# Patient Record
Sex: FEMALE | Race: WHITE | Employment: FULL TIME | URBAN - METROPOLITAN AREA
[De-identification: names, ages, dates, MRNs, and addresses within clinical notes are randomized per-mention and may not be internally consistent; named-entity substitution may affect disease eponyms.]

---

## 2017-04-14 ENCOUNTER — HOSPITAL ENCOUNTER (OUTPATIENT)
Dept: MAMMOGRAPHY | Age: 52
Discharge: HOME | End: 2017-04-14
Attending: OBSTETRICS & GYNECOLOGY
Payer: COMMERCIAL

## 2017-04-14 DIAGNOSIS — Z12.39 SCREENING FOR BREAST CANCER: ICD-10-CM

## 2017-04-14 PROCEDURE — 77067 SCR MAMMO BI INCL CAD: CPT

## 2017-04-19 NOTE — PROGRESS NOTES
Call [facility] and let her know her mammogram is normal.  She does have dense breasts though. I would recommend that she have a 3D screening ultrasound next year to get a better view of the dense tissue.

## 2018-04-16 ENCOUNTER — HOSPITAL ENCOUNTER (OUTPATIENT)
Dept: MAMMOGRAPHY | Age: 53
Discharge: HOME | End: 2018-04-16
Attending: OBSTETRICS & GYNECOLOGY
Payer: COMMERCIAL

## 2018-04-16 DIAGNOSIS — Z12.31 VISIT FOR SCREENING MAMMOGRAM: ICD-10-CM

## 2018-04-16 PROCEDURE — 77063 BREAST TOMOSYNTHESIS BI: CPT

## 2018-11-15 ENCOUNTER — APPOINTMENT (OUTPATIENT)
Dept: ULTRASOUND IMAGING | Age: 53
End: 2018-11-15
Attending: EMERGENCY MEDICINE
Payer: COMMERCIAL

## 2018-11-15 ENCOUNTER — HOSPITAL ENCOUNTER (EMERGENCY)
Age: 53
Discharge: HOME | End: 2018-11-15
Attending: EMERGENCY MEDICINE
Payer: COMMERCIAL

## 2018-11-15 ENCOUNTER — APPOINTMENT (OUTPATIENT)
Dept: CT IMAGING | Age: 53
End: 2018-11-15
Attending: EMERGENCY MEDICINE
Payer: COMMERCIAL

## 2018-11-15 VITALS
OXYGEN SATURATION: 98 % | BODY MASS INDEX: 36.65 KG/M2 | TEMPERATURE: 98 F | SYSTOLIC BLOOD PRESSURE: 151 MMHG | HEIGHT: 65 IN | WEIGHT: 220 LBS | DIASTOLIC BLOOD PRESSURE: 71 MMHG | HEART RATE: 88 BPM | RESPIRATION RATE: 16 BRPM

## 2018-11-15 DIAGNOSIS — K80.20 CALCULUS OF GALLBLADDER WITHOUT CHOLECYSTITIS WITHOUT OBSTRUCTION: ICD-10-CM

## 2018-11-15 DIAGNOSIS — N30.01 ACUTE CYSTITIS WITH HEMATURIA: Primary | ICD-10-CM

## 2018-11-15 LAB
ALBUMIN SERPL-MCNC: 3.6 G/DL (ref 3.5–5)
ALBUMIN/GLOB SERPL: 1.1 {RATIO}
ALP SERPL-CCNC: 134 U/L (ref 50–130)
ALT SERPL-CCNC: 64 U/L (ref 12–65)
ANION GAP SERPL CALC-SCNC: 9 MMOL/L
AST SERPL-CCNC: 33 U/L (ref 15–37)
BACTERIA URNS QL MICRO: 0 /HPF
BASOPHILS # BLD: 0 K/UL (ref 0–0.2)
BASOPHILS NFR BLD: 0 % (ref 0–2)
BILIRUB SERPL-MCNC: 0.5 MG/DL (ref 0.2–1.1)
BUN SERPL-MCNC: 12 MG/DL (ref 6–23)
CALCIUM SERPL-MCNC: 8.8 MG/DL (ref 8.3–10.4)
CASTS URNS QL MICRO: ABNORMAL /LPF
CHLORIDE SERPL-SCNC: 104 MMOL/L (ref 98–107)
CO2 SERPL-SCNC: 24 MMOL/L (ref 21–32)
CREAT SERPL-MCNC: 0.87 MG/DL (ref 0.6–1)
DIFFERENTIAL METHOD BLD: ABNORMAL
EOSINOPHIL # BLD: 0 K/UL (ref 0–0.8)
EOSINOPHIL NFR BLD: 0 % (ref 0.5–7.8)
EPI CELLS #/AREA URNS HPF: ABNORMAL /HPF
ERYTHROCYTE [DISTWIDTH] IN BLOOD BY AUTOMATED COUNT: 12.6 %
GLOBULIN SER CALC-MCNC: 3.4 G/DL (ref 2.3–3.5)
GLUCOSE SERPL-MCNC: 102 MG/DL (ref 65–100)
HCT VFR BLD AUTO: 41.4 % (ref 35.8–46.3)
HGB BLD-MCNC: 13.7 G/DL (ref 11.7–15.4)
IMM GRANULOCYTES # BLD: 0.1 K/UL (ref 0–0.5)
IMM GRANULOCYTES NFR BLD AUTO: 1 % (ref 0–5)
LYMPHOCYTES # BLD: 1.1 K/UL (ref 0.5–4.6)
LYMPHOCYTES NFR BLD: 9 % (ref 13–44)
MCH RBC QN AUTO: 28.8 PG (ref 26.1–32.9)
MCHC RBC AUTO-ENTMCNC: 33.1 G/DL (ref 31.4–35)
MCV RBC AUTO: 87.2 FL (ref 79.6–97.8)
MONOCYTES # BLD: 0.8 K/UL (ref 0.1–1.3)
MONOCYTES NFR BLD: 6 % (ref 4–12)
NEUTS SEG # BLD: 10.9 K/UL (ref 1.7–8.2)
NEUTS SEG NFR BLD: 84 % (ref 43–78)
NRBC # BLD: 0 K/UL (ref 0–0.2)
PLATELET # BLD AUTO: 231 K/UL (ref 150–450)
PMV BLD AUTO: 9.8 FL (ref 9.4–12.3)
POTASSIUM SERPL-SCNC: 4.2 MMOL/L (ref 3.5–5.1)
PROT SERPL-MCNC: 7 G/DL
RBC # BLD AUTO: 4.75 M/UL (ref 4.05–5.2)
RBC #/AREA URNS HPF: >100 /HPF
SODIUM SERPL-SCNC: 137 MMOL/L (ref 136–145)
WBC # BLD AUTO: 13 K/UL (ref 4.3–11.1)
WBC URNS QL MICRO: >100 /HPF

## 2018-11-15 PROCEDURE — 87186 SC STD MICRODIL/AGAR DIL: CPT

## 2018-11-15 PROCEDURE — 80053 COMPREHEN METABOLIC PANEL: CPT

## 2018-11-15 PROCEDURE — 96375 TX/PRO/DX INJ NEW DRUG ADDON: CPT | Performed by: EMERGENCY MEDICINE

## 2018-11-15 PROCEDURE — 85025 COMPLETE CBC W/AUTO DIFF WBC: CPT

## 2018-11-15 PROCEDURE — 99284 EMERGENCY DEPT VISIT MOD MDM: CPT | Performed by: EMERGENCY MEDICINE

## 2018-11-15 PROCEDURE — 96376 TX/PRO/DX INJ SAME DRUG ADON: CPT | Performed by: EMERGENCY MEDICINE

## 2018-11-15 PROCEDURE — 74011000258 HC RX REV CODE- 258: Performed by: EMERGENCY MEDICINE

## 2018-11-15 PROCEDURE — 96365 THER/PROPH/DIAG IV INF INIT: CPT | Performed by: EMERGENCY MEDICINE

## 2018-11-15 PROCEDURE — 81015 MICROSCOPIC EXAM OF URINE: CPT

## 2018-11-15 PROCEDURE — 87088 URINE BACTERIA CULTURE: CPT

## 2018-11-15 PROCEDURE — 74011250636 HC RX REV CODE- 250/636: Performed by: EMERGENCY MEDICINE

## 2018-11-15 PROCEDURE — 87086 URINE CULTURE/COLONY COUNT: CPT

## 2018-11-15 PROCEDURE — 76705 ECHO EXAM OF ABDOMEN: CPT

## 2018-11-15 PROCEDURE — 74176 CT ABD & PELVIS W/O CONTRAST: CPT

## 2018-11-15 RX ORDER — ONDANSETRON 2 MG/ML
4 INJECTION INTRAMUSCULAR; INTRAVENOUS
Status: COMPLETED | OUTPATIENT
Start: 2018-11-15 | End: 2018-11-15

## 2018-11-15 RX ORDER — MORPHINE SULFATE 2 MG/ML
4 INJECTION, SOLUTION INTRAMUSCULAR; INTRAVENOUS ONCE
Status: DISCONTINUED | OUTPATIENT
Start: 2018-11-15 | End: 2018-11-15 | Stop reason: SDUPTHER

## 2018-11-15 RX ORDER — ONDANSETRON 4 MG/1
4 TABLET, ORALLY DISINTEGRATING ORAL
Qty: 6 TAB | Refills: 0 | Status: SHIPPED | OUTPATIENT
Start: 2018-11-15 | End: 2018-11-19

## 2018-11-15 RX ORDER — CEPHALEXIN 500 MG/1
500 CAPSULE ORAL 4 TIMES DAILY
Qty: 28 CAP | Refills: 0 | Status: SHIPPED | OUTPATIENT
Start: 2018-11-15 | End: 2018-11-22

## 2018-11-15 RX ORDER — KETOROLAC TROMETHAMINE 30 MG/ML
30 INJECTION, SOLUTION INTRAMUSCULAR; INTRAVENOUS
Status: COMPLETED | OUTPATIENT
Start: 2018-11-15 | End: 2018-11-15

## 2018-11-15 RX ORDER — MORPHINE SULFATE 4 MG/ML
4 INJECTION INTRAVENOUS ONCE
Status: COMPLETED | OUTPATIENT
Start: 2018-11-15 | End: 2018-11-15

## 2018-11-15 RX ADMIN — SODIUM CHLORIDE 1000 ML: 900 INJECTION, SOLUTION INTRAVENOUS at 09:10

## 2018-11-15 RX ADMIN — KETOROLAC TROMETHAMINE 30 MG: 30 INJECTION, SOLUTION INTRAMUSCULAR at 09:10

## 2018-11-15 RX ADMIN — CEFTRIAXONE 1 G: 1 INJECTION, POWDER, FOR SOLUTION INTRAMUSCULAR; INTRAVENOUS at 10:47

## 2018-11-15 RX ADMIN — ONDANSETRON 4 MG: 2 INJECTION INTRAMUSCULAR; INTRAVENOUS at 10:44

## 2018-11-15 RX ADMIN — ONDANSETRON 4 MG: 2 INJECTION INTRAMUSCULAR; INTRAVENOUS at 09:09

## 2018-11-15 RX ADMIN — MORPHINE SULFATE 4 MG: 4 INJECTION INTRAVENOUS at 10:45

## 2018-11-15 NOTE — DISCHARGE INSTRUCTIONS
Cholecystectomy: Before Your Surgery  What is cholecystectomy? Cholecystectomy (pj-shk-ckc-MICHELLE-tuh-dhiraj) is a type of surgery. It removes a diseased gallbladder. This surgery is usually done as a laparoscopic surgery. The doctor puts a lighted tube and other surgical tools through small cuts (incisions) in your belly. The tube is called a scope. It lets your doctor see your organs so he or she can do the surgery. The incisions leave scars that fade with time. Most people go home the same day. You probably will feel better each day. Most people have only a small amount of pain after 1 week. If you have a desk job, you can probably go back to work in 1 to 2 weeks. If you lift heavy objects or have a very active job, it may take up to 4 weeks. In some cases, open surgery is the best choice. Your doctor may choose open surgery in advance. Or he or she may choose it in the middle of laparoscopic surgery. In open surgery, the doctor makes a larger incision in your upper belly. If you have open surgery, you will probably stay in the hospital for 2 to 4 days. And it may take 4 to 6 weeks to get back to your normal routine. Follow-up care is a key part of your treatment and safety. Be sure to make and go to all appointments, and call your doctor if you are having problems. It's also a good idea to know your test results and keep a list of the medicines you take. What happens before surgery?   Surgery can be stressful. This information will help you understand what you can expect. And it will help you safely prepare for surgery.   Preparing for surgery    · Understand exactly what surgery is planned, along with the risks, benefits, and other options. · Tell your doctors ALL the medicines, vitamins, supplements, and herbal remedies you take.  Some of these can increase the risk of bleeding or interact with anesthesia.     · If you take blood thinners, such as warfarin (Coumadin), clopidogrel (Plavix), or aspirin, be sure to talk to your doctor. He or she will tell you if you should stop taking these medicines before your surgery. Make sure that you understand exactly what your doctor wants you to do.     · Your doctor will tell you which medicines to take or stop before your surgery. You may need to stop taking certain medicines a week or more before surgery. So talk to your doctor as soon as you can.     · If you have an advance directive, let your doctor know. It may include a living will and a durable power of  for health care. Bring a copy to the hospital. If you don't have one, you may want to prepare one. It lets your doctor and loved ones know your health care wishes. Doctors advise that everyone prepare these papers before any type of surgery or procedure.     · You may need to empty your colon with an enema or laxative. Your doctor will tell you how to do this. What happens on the day of surgery? · Follow the instructions exactly about when to stop eating and drinking. If you don't, your surgery may be canceled. If your doctor told you to take your medicines on the day of surgery, take them with only a sip of water.     · Take a bath or shower before you come in for your surgery. Do not apply lotions, perfumes, deodorants, or nail polish.     · Do not shave the surgical site yourself.     · Take off all jewelry and piercings. And take out contact lenses, if you wear them.    At the hospital or surgery center   · Bring a picture ID.     · The area for surgery is often marked to make sure there are no errors.     · You will be kept comfortable and safe by your anesthesia provider. You will be asleep during the surgery.     · The surgery usually takes 1 to 2 hours. Going home   · Be sure you have someone to drive you home. Anesthesia and pain medicine make it unsafe for you to drive.     · You will be given more specific instructions about recovering from your surgery.  They will cover things like diet, wound care, follow-up care, driving, and getting back to your normal routine. When should you call your doctor? · You have questions or concerns.     · You don't understand how to prepare for your surgery.     · You become ill before the surgery (such as fever, flu, or a cold).     · You need to reschedule or have changed your mind about having the surgery. Where can you learn more? Go to http://mani-maria dolores.info/. Enter R342 in the search box to learn more about \"Cholecystectomy: Before Your Surgery. \"  Current as of: March 28, 2018  Content Version: 11.8  © 6651-2981 [employer] Care instructions adapted under license by [employer] (which disclaims liability or warranty for this information). If you have questions about a medical condition or this instruction, always ask your healthcare professional. [address] any warranty or liability for your use of this information. Low-Fat Diet for Gallbladder Disease: Care Instructions  Your Care Instructions    When you eat, the gallbladder releases bile, which helps you digest the fat in food. If you have an inflamed gallbladder, this may cause pain. A low-fat diet may give your gallbladder a rest so you can start to heal. Your doctor and dietitian can help you make an eating plan that does not irritate your digestive system. Always talk with your doctor or dietitian before you make changes in your diet. Follow-up care is a key part of your treatment and safety. Be sure to make and go to all appointments, and call your doctor if you are having problems. It's also a good idea to know your test results and keep a list of the medicines you take. How can you care for yourself at home? · Eat many small meals and snacks each day instead of three large meals. · Choose lean meats. ? Eat no more than 5 to 6½ ounces of meat a day. ? Cut off all fat you can see. ?  Eat chicken and turkey without the skin. ? Many types of fish, such as salmon, lake trout, tuna, and herring, provide healthy omega-3 fat. But, avoid fish canned in oil, such as sardines in olive oil. ? Bake, broil, or grill meats, poultry, or fish instead of frying them in butter or fat. · Drink or eat nonfat or low-fat milk, yogurt, cheese, or other milk products each day. ? Read the labels on cheeses, and choose those with less than 5 grams of fat an ounce. ? Try fat-free sour cream, cream cheese, or yogurt. ? Avoid cream soups and cream sauces on pasta. ? Eat low-fat ice cream, frozen yogurt, or sorbet. Avoid regular ice cream.  · Eat whole-grain cereals, breads, crackers, rice, or pasta. Avoid high-fat foods such as croissants, scones, biscuits, waffles, doughnuts, muffins, granola, and high-fat breads. · Flavor your foods with herbs and spices (such as basil, tarragon, or mint), fat-free sauces, or lemon juice instead of butter. You can also use butter substitutes, fat-free mayonnaise, or fat-free dressing. · Try applesauce, prune puree, or mashed bananas to replace some or all of the fat when you bake. · Limit fats and oils, such as butter, margarine, mayonnaise, and salad dressing, to no more than 1 tablespoon a meal.  · Avoid high-fat foods, such as:  ? Chocolate, whole milk, ice cream, and processed cheese. ? Fried or buttered foods. ? Sausage, salami, and galan. ? Cinnamon rolls, cakes, pies, cookies, and other pastries. ? Prepared snack foods, such as potato chips, nut and granola bars, and mixed nuts. ? Coconut and avocado. · Learn how to read food labels for serving sizes and ingredients. Fast-food and convenience-food meals often have lots of fat. Where can you learn more? Go to http://mani-maria dolores.info/. Enter L936 in the search box to learn more about \"Low-Fat Diet for Gallbladder Disease: Care Instructions. \"  Current as of: March 29, 2018  Content Version: 11.8  © 1691-1673 [employer]. Care instructions adapted under license by [employer] (which disclaims liability or warranty for this information). If you have questions about a medical condition or this instruction, always ask your healthcare professional. Jhonny Ellis any warranty or liability for your use of this information. Learning About Gallstones  What are gallstones? Gallstones are stones that form in the gallbladder. The gallbladder is a small sac located just under the liver. It stores bile released by the liver. Bile helps you digest fats. Gallstones can be smaller than a grain of sand or as large as a golf ball. Gallstones form when cholesterol and other things found in bile make stones. They can also form if the gallbladder doesn't empty as it should. Gallstones can also form in the common bile duct or cystic duct. These tubes carry bile from the gallbladder and the liver to the small intestine. What happens when you have gallstones? Gallstones can cause many different problems, such as:  · A blockage in the common bile duct. · Inflammation or infection of the gallbladder (acute cholecystitis). This can happen when a gallstone blocks the cystic duct. · Inflammation or infection of the common bile duct (cholangitis). This can happen when gallstones get stuck in the common bile duct. In rare cases, this can damage the liver or spread infection. · Pancreatitis, an inflammation of the pancreas. What are the symptoms? · Most people who have gallstones don't have symptoms. · Symptoms can include:  ? Mild pain in the pit of your stomach or in the upper right part of your belly. It may spread to your right upper back or shoulder blade area. ? Severe pain that may come and go or be steady. It may get worse when you eat. ? Fever and chills, if a gallstone is blocking a bile duct and causing an infection.   ? Yellowing of your skin and the whites of your eyes.  How can you prevent gallstones? There is no sure way to prevent gallstones. But you can reduce your risk of forming gallstones that can cause symptoms. · Stay at a healthy weight. If you need to lose weight, do so slowly and sensibly. · Eat regular, balanced meals. · Be active, and exercise regularly. How are gallstones treated? · If you don't have symptoms, you probably don't need treatment. · For mild symptoms, your doctor may have you take pain medicine and wait to see if the pain goes away. · For severe pain or infection, or if you have more than one gallstone attack, your doctor may suggest surgery to have your gallbladder removed. The body works fine without a gallbladder. Follow-up care is a key part of your treatment and safety. Be sure to make and go to all appointments, and call your doctor if you are having problems. It's also a good idea to know your test results and keep a list of the medicines you take. Where can you learn more? Go to http://mani-maria dolores.info/. Enter  in the search box to learn more about \"Learning About Gallstones. \"  Current as of: March 28, 2018  Content Version: 11.8  © 3611-1967 [employer]. Care instructions adapted under license by [employer] (which disclaims liability or warranty for this information). If you have questions about a medical condition or this instruction, always ask your healthcare professional. [place] [address] any warranty or liability for your use of this information. Urinary Tract Infection in Women: Care Instructions  Your Care Instructions    A urinary tract infection, or UTI, is a general term for an infection anywhere between the kidneys and the urethra (where urine comes out). Most UTIs are bladder infections. They often cause pain or burning when you urinate. UTIs are caused by bacteria and can be cured with antibiotics.  Be sure to complete your treatment so that the infection goes away. Follow-up care is a key part of your treatment and safety. Be sure to make and go to all appointments, and call your doctor if you are having problems. It's also a good idea to know your test results and keep a list of the medicines you take. How can you care for yourself at home? · Take your antibiotics as directed. Do not stop taking them just because you feel better. You need to take the full course of antibiotics. · Drink extra water and other fluids for the next day or two. This may help wash out the bacteria that are causing the infection. (If you have kidney, heart, or liver disease and have to limit fluids, talk with your doctor before you increase your fluid intake.)  · Avoid drinks that are carbonated or have caffeine. They can irritate the bladder. · Urinate often. Try to empty your bladder each time. · To relieve pain, take a hot bath or lay a heating pad set on low over your lower belly or genital area. Never go to sleep with a heating pad in place. To prevent UTIs  · Drink plenty of water each day. This helps you urinate often, which clears bacteria from your system. (If you have kidney, heart, or liver disease and have to limit fluids, talk with your doctor before you increase your fluid intake.)  · Urinate when you need to. · Urinate right after you have sex. · Change sanitary pads often. · Avoid douches, bubble baths, feminine hygiene sprays, and other feminine hygiene products that have deodorants. · After going to the bathroom, wipe from front to back. When should you call for help? Call your doctor now or seek immediate medical care if:    · Symptoms such as fever, chills, nausea, or vomiting get worse or appear for the first time.     · You have new pain in your back just below your rib cage.  This is called flank pain.     · There is new blood or pus in your urine.     · You have any problems with your antibiotic medicine.    Watch closely for changes in your health, and be sure to contact your doctor if:    · You are not getting better after taking an antibiotic for 2 days.     · Your symptoms go away but then come back. Where can you learn more? Go to http://mani-maria dolores.info/. Enter Q289 in the search box to learn more about \"Urinary Tract Infection in Women: Care Instructions. \"  Current as of: March 21, 2018  Content Version: 11.8  © 8416-8514 [employer]. Care instructions adapted under license by [employer] (which disclaims liability or warranty for this information). If you have questions about a medical condition or this instruction, always ask your healthcare professional. [place] [address] any warranty or liability for your use of this information.

## 2018-11-15 NOTE — ED NOTES
I have reviewed discharge instructions with the patient. The patient verbalized understanding. Patient left ED via Discharge Method: ambulatory to Home with (family). Opportunity for questions and clarification provided. Patient given 2 scripts. To continue your aftercare when you leave the hospital, you may receive an automated call from our care team to check in on how you are doing. This is a free service and part of our promise to provide the best care and service to meet your aftercare needs.  If you have questions, or wish to unsubscribe from this service please call [phone]. Thank you for Choosing our [facility] Emergency Department.

## 2018-11-15 NOTE — ED TRIAGE NOTES
Patient complaining of seeing small amount of blood in urine last night however during night started with increased cramping and states \"pure blood\" with urine now. Patient advises that she has a \"filling sensation\" right after urination.

## 2018-11-18 LAB
BACTERIA SPEC CULT: ABNORMAL
BACTERIA SPEC CULT: ABNORMAL
SERVICE CMNT-IMP: ABNORMAL

## 2018-11-21 PROBLEM — E66.01 SEVERE OBESITY (HCC): Status: ACTIVE | Noted: 2018-11-21

## 2018-11-21 NOTE — H&P (VIEW-ONLY)
Chris Thomason DO 
[address], [address] [place] Phone [phone]   Fax [phone] Date of visit: 2018 Primary/Requesting provider: Nemesio London MD 
 
Chief Complaint Patient presents with  New Patient  
  gallstones Name: Sadia Ceja MRN: [id number] : 1965 Age: 48 y.o. Sex: female PCP: Nemesio London MD  
 
 
CC:   
Chief Complaint Patient presents with  New Patient  
  gallstones HPI: 
 
 Sadia Ceja is a 48 y.o. female who presents for evaluation of cholelithiasis. This is a healthy 80-year-old female complaining of right upper quadrant abdominal pain and nausea for up to 1 year. She states that she began having the right upper quadrant abdominal pain rated 5 out of 10 approximately a year ago. She reports associated nausea without vomiting. She denies any fevers. She states that approximately 3 months ago the pain became more consistent and more frequent still rated 5 out of 10. She presented to the emergency department where a CT of the abdomen pelvis revealed a gallstone this was followed up with an ultrasound which revealed cholelithiasis without gallbladder wall thickening or ductal dilatation. Her liver function tests have been normal to date. She states that nothing seems to make the pain better. She cannot identify any aggravating factors such as certain foods or activities. She is here today to discuss gallbladder disorders and possible surgery. Madison Oh PMH: 
 
Past Medical History:  
Diagnosis Date  Fibromyalgia   
 on med for control  Fibromyalgia  GERD (gastroesophageal reflux disease) diet controlled  History of IBS  Hypertension   
 controlled with med  Liver disease   
 elevated LFTs  Renal stone  Severe obesity ([address] 2018 PSH: 
 
Past Surgical History:  
Procedure Laterality Date  HX COLONOSCOPY  02/2018  
 and egd  HX [de-identified]  10yrs old  
 plastic surgery on lip  HX HYSTERECTOMY  HX ROTATOR CUFF REPAIR Right 2018  HX TOTAL LAPAROSCOPIC HYSTERECTOMY W/ BS&O  12/10/14 Eric Mina MEDS: 
 
Current Outpatient Medications Medication Sig  cephALEXin (KEFLEX) 500 mg capsule Take 1 Cap by mouth four (4) times daily for 7 days.  pantoprazole (PROTONIX) 20 mg tablet Take 20 mg by mouth daily.  pramipexole (MIRAPEX) 0.5 mg tablet Take 0.5 mg by mouth three (3) times daily.  atenolol (TENORMIN) 50 mg tablet Take  by mouth daily. Take / use AM day of surgery  per anesthesia protocols.  sertraline (ZOLOFT) 50 mg tablet Take  by mouth daily. Take / use AM day of surgery  per anesthesia protocols. Indications: fibromyalgia  tramadol (ULTRAM) 50 mg tablet Take 50 mg by mouth every six (6) hours as needed. Take / use AM day of surgery if needed per anesthesia protocols. No current facility-administered medications for this visit. ALLERGIES:   
 
Allergies Allergen Reactions  Chlorhexidine Rash  Codeine Other (comments) \"flushed\" \"heart palpitations\" SH: 
 
Social History Tobacco Use  Smoking status: Never Smoker  Smokeless tobacco: Never Used Substance Use Topics  Alcohol use: No  
 Drug use: No  
 
 
FH: 
 
Family History Problem Relation Age of Onset  Cancer Father   
     lung  Hypertension Brother  Emphysema Mother  Cancer Mother  Cancer Sister   
     brain tumor  Cancer Sister   
     bladder cancer x 2  
 Stroke Sister  Heart Disease Brother  Cancer Brother   
     bladder cancer  Heart Attack Brother  Thyroid Disease Brother Review of Systems Constitutional: Negative. HENT: Negative. Eyes: Positive for blurred vision. Respiratory: Negative. Cardiovascular: Negative. Gastrointestinal: Positive for abdominal pain, diarrhea, heartburn and nausea. Genitourinary:  
     Being treated for uti. Musculoskeletal: Positive for myalgias. Skin: Negative. Neurological: Negative. Endo/Heme/Allergies: Bruises/bleeds easily. Psychiatric/Behavioral: Negative. Physical Exam:  
 
Visit Vitals /90 Pulse 60 Wt 224 lb (101.6 kg) LMP 11/02/2014 BMI 37.28 kg/m² Physical Exam  
Constitutional: She is oriented to person, place, and time and well-developed, well-nourished, and in no distress. HENT:  
Head: Normocephalic and atraumatic. Mouth/Throat: Oropharynx is clear and moist.  
Eyes: EOM are normal. Pupils are equal, round, and reactive to light. Neck: Normal range of motion. Neck supple. No tracheal deviation present. No thyromegaly present. Cardiovascular: Normal rate, regular rhythm and normal heart sounds. No murmur heard. Pulmonary/Chest: Effort normal and breath sounds normal. No respiratory distress. She has no wheezes. She has no rales. Abdominal: Soft. Bowel sounds are normal. She exhibits no distension and no mass. There is no tenderness. There is no rebound and no guarding. Musculoskeletal: Normal range of motion. She exhibits no edema. Neurological: She is alert and oriented to person, place, and time. Skin: Skin is warm and dry. No erythema. Psychiatric: Mood and judgment normal.  
 
 
Assessment/Plan:  Keith Baker is a 48 y.o. female who has signs and symptoms consistent with cholelithiasis. Today I recommended a laparoscopic cholecystectomy with possible cholangiogram the patient wishes to be scheduled at the next available date and time. ICD-10-CM ICD-9-CM 1. Calculus of gallbladder without cholecystitis without obstruction K80.20 574.20 I went through the risks of bleeding, infection and anesthesia.   
 
Counseling time:counseling time more than 50% of visit: 50 minutes were utilized in office visit a 50% of the time was in a face-to-face discussion explaining biliary anatomy, gallbladder disorders, details of surgery and postoperative expectations.  
 
Signed: Mahi Vieira DO  
11/21/2018  8:48 AM

## 2018-12-05 ENCOUNTER — ANESTHESIA EVENT (OUTPATIENT)
Dept: SURGERY | Age: 53
End: 2018-12-05
Payer: COMMERCIAL

## 2018-12-06 ENCOUNTER — HOSPITAL ENCOUNTER (OUTPATIENT)
Age: 53
Setting detail: OUTPATIENT SURGERY
Discharge: HOME | End: 2018-12-06
Attending: SURGERY | Admitting: SURGERY
Payer: COMMERCIAL

## 2018-12-06 ENCOUNTER — ANESTHESIA (OUTPATIENT)
Dept: SURGERY | Age: 53
End: 2018-12-06
Payer: COMMERCIAL

## 2018-12-06 VITALS
DIASTOLIC BLOOD PRESSURE: 71 MMHG | HEIGHT: 65 IN | RESPIRATION RATE: 18 BRPM | SYSTOLIC BLOOD PRESSURE: 133 MMHG | HEART RATE: 55 BPM | WEIGHT: 223 LBS | OXYGEN SATURATION: 93 % | TEMPERATURE: 98.8 F | BODY MASS INDEX: 37.15 KG/M2

## 2018-12-06 DIAGNOSIS — K80.12 CALCULUS OF GALLBLADDER WITH ACUTE ON CHRONIC CHOLECYSTITIS WITHOUT OBSTRUCTION: Primary | ICD-10-CM

## 2018-12-06 PROCEDURE — C1894 INTRO/SHEATH, NON-LASER: HCPCS | Performed by: SURGERY

## 2018-12-06 PROCEDURE — 77030031139 HC SUT VCRL2 J&J -A: Performed by: SURGERY

## 2018-12-06 PROCEDURE — 77030004818 HC CATH CHOLGM TELE -B: Performed by: SURGERY

## 2018-12-06 PROCEDURE — 74011000250 HC RX REV CODE- 250: Performed by: SURGERY

## 2018-12-06 PROCEDURE — 74011250636 HC RX REV CODE- 250/636

## 2018-12-06 PROCEDURE — 74011250637 HC RX REV CODE- 250/637: Performed by: ANESTHESIOLOGY

## 2018-12-06 PROCEDURE — 77030020782 HC GWN BAIR PAWS FLX 3M -B: Performed by: ANESTHESIOLOGY

## 2018-12-06 PROCEDURE — 77030016151 HC PROTCTR LNS DFOG COVD -B: Performed by: SURGERY

## 2018-12-06 PROCEDURE — 74011000250 HC RX REV CODE- 250

## 2018-12-06 PROCEDURE — 77030012022 HC APPL CLP ENDOSC COVD -C: Performed by: SURGERY

## 2018-12-06 PROCEDURE — 77030035048 HC TRCR ENDOSC OPTCL COVD -B: Performed by: SURGERY

## 2018-12-06 PROCEDURE — 77030000038 HC TIP SCIS LAPSCP SURI -B: Performed by: SURGERY

## 2018-12-06 PROCEDURE — 77030008756 HC TU IRR SUC STRY -B: Performed by: SURGERY

## 2018-12-06 PROCEDURE — 88304 TISSUE EXAM BY PATHOLOGIST: CPT

## 2018-12-06 PROCEDURE — 77030035051: Performed by: SURGERY

## 2018-12-06 PROCEDURE — 77030019908 HC STETH ESOPH SIMS -A: Performed by: ANESTHESIOLOGY

## 2018-12-06 PROCEDURE — 77030009852 HC PCH RTVR ENDOSC COVD -B: Performed by: SURGERY

## 2018-12-06 PROCEDURE — 77030039425 HC BLD LARYNG TRULITE DISP TELE -A: Performed by: ANESTHESIOLOGY

## 2018-12-06 PROCEDURE — 77030037088 HC TUBE ENDOTRACH ORAL NSL COVD-A: Performed by: ANESTHESIOLOGY

## 2018-12-06 PROCEDURE — 74011250636 HC RX REV CODE- 250/636: Performed by: SURGERY

## 2018-12-06 PROCEDURE — 76210000021 HC REC RM PH II 0.5 TO 1 HR: Performed by: SURGERY

## 2018-12-06 PROCEDURE — 74011250636 HC RX REV CODE- 250/636: Performed by: ANESTHESIOLOGY

## 2018-12-06 PROCEDURE — 76010000161 HC OR TIME 1 TO 1.5 HR INTENSV-TIER 1: Performed by: SURGERY

## 2018-12-06 PROCEDURE — 77030035220 HC TRCR ENDOSC BLNTPRT ANCHR COVD -B: Performed by: SURGERY

## 2018-12-06 PROCEDURE — 77030018836 HC SOL IRR NACL ICUM -A: Performed by: SURGERY

## 2018-12-06 PROCEDURE — 76060000033 HC ANESTHESIA 1 TO 1.5 HR: Performed by: SURGERY

## 2018-12-06 PROCEDURE — 76210000006 HC OR PH I REC 0.5 TO 1 HR: Performed by: SURGERY

## 2018-12-06 PROCEDURE — 77030008522 HC TBNG INSUF LAPRO STRY -B: Performed by: SURGERY

## 2018-12-06 RX ORDER — DEXAMETHASONE SODIUM PHOSPHATE 4 MG/ML
INJECTION, SOLUTION INTRA-ARTICULAR; INTRALESIONAL; INTRAMUSCULAR; INTRAVENOUS; SOFT TISSUE AS NEEDED
Status: DISCONTINUED | OUTPATIENT
Start: 2018-12-06 | End: 2018-12-06 | Stop reason: HOSPADM

## 2018-12-06 RX ORDER — NALOXONE HYDROCHLORIDE 0.4 MG/ML
0.1 INJECTION, SOLUTION INTRAMUSCULAR; INTRAVENOUS; SUBCUTANEOUS
Status: DISCONTINUED | OUTPATIENT
Start: 2018-12-06 | End: 2018-12-06 | Stop reason: HOSPADM

## 2018-12-06 RX ORDER — SODIUM CHLORIDE 0.9 % (FLUSH) 0.9 %
5-10 SYRINGE (ML) INJECTION AS NEEDED
Status: DISCONTINUED | OUTPATIENT
Start: 2018-12-06 | End: 2018-12-06 | Stop reason: HOSPADM

## 2018-12-06 RX ORDER — EPHEDRINE SULFATE 50 MG/ML
INJECTION, SOLUTION INTRAVENOUS AS NEEDED
Status: DISCONTINUED | OUTPATIENT
Start: 2018-12-06 | End: 2018-12-06 | Stop reason: HOSPADM

## 2018-12-06 RX ORDER — OXYCODONE HYDROCHLORIDE 5 MG/1
5 TABLET ORAL
Status: COMPLETED | OUTPATIENT
Start: 2018-12-06 | End: 2018-12-06

## 2018-12-06 RX ORDER — LIDOCAINE HYDROCHLORIDE 20 MG/ML
INJECTION, SOLUTION EPIDURAL; INFILTRATION; INTRACAUDAL; PERINEURAL AS NEEDED
Status: DISCONTINUED | OUTPATIENT
Start: 2018-12-06 | End: 2018-12-06 | Stop reason: HOSPADM

## 2018-12-06 RX ORDER — SUCCINYLCHOLINE CHLORIDE 20 MG/ML
INJECTION INTRAMUSCULAR; INTRAVENOUS AS NEEDED
Status: DISCONTINUED | OUTPATIENT
Start: 2018-12-06 | End: 2018-12-06 | Stop reason: HOSPADM

## 2018-12-06 RX ORDER — FLUMAZENIL 0.1 MG/ML
0.2 INJECTION INTRAVENOUS
Status: DISCONTINUED | OUTPATIENT
Start: 2018-12-06 | End: 2018-12-06 | Stop reason: HOSPADM

## 2018-12-06 RX ORDER — GLYCOPYRROLATE 0.2 MG/ML
INJECTION INTRAMUSCULAR; INTRAVENOUS AS NEEDED
Status: DISCONTINUED | OUTPATIENT
Start: 2018-12-06 | End: 2018-12-06 | Stop reason: HOSPADM

## 2018-12-06 RX ORDER — KETOROLAC TROMETHAMINE 30 MG/ML
INJECTION, SOLUTION INTRAMUSCULAR; INTRAVENOUS AS NEEDED
Status: DISCONTINUED | OUTPATIENT
Start: 2018-12-06 | End: 2018-12-06 | Stop reason: HOSPADM

## 2018-12-06 RX ORDER — HYDROMORPHONE HYDROCHLORIDE 2 MG/ML
0.5 INJECTION, SOLUTION INTRAMUSCULAR; INTRAVENOUS; SUBCUTANEOUS
Status: DISCONTINUED | OUTPATIENT
Start: 2018-12-06 | End: 2018-12-06 | Stop reason: HOSPADM

## 2018-12-06 RX ORDER — SODIUM CHLORIDE, SODIUM LACTATE, POTASSIUM CHLORIDE, CALCIUM CHLORIDE 600; 310; 30; 20 MG/100ML; MG/100ML; MG/100ML; MG/100ML
75 INJECTION, SOLUTION INTRAVENOUS CONTINUOUS
Status: DISCONTINUED | OUTPATIENT
Start: 2018-12-06 | End: 2018-12-06 | Stop reason: HOSPADM

## 2018-12-06 RX ORDER — FENTANYL CITRATE 50 UG/ML
INJECTION, SOLUTION INTRAMUSCULAR; INTRAVENOUS AS NEEDED
Status: DISCONTINUED | OUTPATIENT
Start: 2018-12-06 | End: 2018-12-06 | Stop reason: HOSPADM

## 2018-12-06 RX ORDER — PROPOFOL 10 MG/ML
INJECTION, EMULSION INTRAVENOUS AS NEEDED
Status: DISCONTINUED | OUTPATIENT
Start: 2018-12-06 | End: 2018-12-06 | Stop reason: HOSPADM

## 2018-12-06 RX ORDER — FAMOTIDINE 20 MG/1
20 TABLET, FILM COATED ORAL
Status: COMPLETED | OUTPATIENT
Start: 2018-12-06 | End: 2018-12-06

## 2018-12-06 RX ORDER — OXYCODONE AND ACETAMINOPHEN 5; 325 MG/1; MG/1
2 TABLET ORAL
Qty: 30 TAB | Refills: 0 | Status: SHIPPED | OUTPATIENT
Start: 2018-12-06 | End: 2019-10-23

## 2018-12-06 RX ORDER — BUPIVACAINE HYDROCHLORIDE AND EPINEPHRINE 5; 5 MG/ML; UG/ML
INJECTION, SOLUTION EPIDURAL; INTRACAUDAL; PERINEURAL AS NEEDED
Status: DISCONTINUED | OUTPATIENT
Start: 2018-12-06 | End: 2018-12-06 | Stop reason: HOSPADM

## 2018-12-06 RX ORDER — ONDANSETRON 2 MG/ML
INJECTION INTRAMUSCULAR; INTRAVENOUS AS NEEDED
Status: DISCONTINUED | OUTPATIENT
Start: 2018-12-06 | End: 2018-12-06 | Stop reason: HOSPADM

## 2018-12-06 RX ORDER — LIDOCAINE HYDROCHLORIDE 10 MG/ML
0.1 INJECTION INFILTRATION; PERINEURAL AS NEEDED
Status: DISCONTINUED | OUTPATIENT
Start: 2018-12-06 | End: 2018-12-06 | Stop reason: HOSPADM

## 2018-12-06 RX ORDER — MIDAZOLAM HYDROCHLORIDE 1 MG/ML
2 INJECTION, SOLUTION INTRAMUSCULAR; INTRAVENOUS
Status: COMPLETED | OUTPATIENT
Start: 2018-12-06 | End: 2018-12-06

## 2018-12-06 RX ORDER — DIPHENHYDRAMINE HYDROCHLORIDE 50 MG/ML
12.5 INJECTION, SOLUTION INTRAMUSCULAR; INTRAVENOUS
Status: DISCONTINUED | OUTPATIENT
Start: 2018-12-06 | End: 2018-12-06 | Stop reason: RX

## 2018-12-06 RX ORDER — ROCURONIUM BROMIDE 10 MG/ML
INJECTION, SOLUTION INTRAVENOUS AS NEEDED
Status: DISCONTINUED | OUTPATIENT
Start: 2018-12-06 | End: 2018-12-06 | Stop reason: HOSPADM

## 2018-12-06 RX ORDER — CEFAZOLIN SODIUM/WATER 2 G/20 ML
2 SYRINGE (ML) INTRAVENOUS ONCE
Status: COMPLETED | OUTPATIENT
Start: 2018-12-06 | End: 2018-12-06

## 2018-12-06 RX ORDER — SODIUM CHLORIDE 0.9 % (FLUSH) 0.9 %
5-10 SYRINGE (ML) INJECTION EVERY 8 HOURS
Status: DISCONTINUED | OUTPATIENT
Start: 2018-12-06 | End: 2018-12-06 | Stop reason: HOSPADM

## 2018-12-06 RX ORDER — SODIUM CHLORIDE, SODIUM LACTATE, POTASSIUM CHLORIDE, CALCIUM CHLORIDE 600; 310; 30; 20 MG/100ML; MG/100ML; MG/100ML; MG/100ML
100 INJECTION, SOLUTION INTRAVENOUS CONTINUOUS
Status: DISCONTINUED | OUTPATIENT
Start: 2018-12-06 | End: 2018-12-06 | Stop reason: HOSPADM

## 2018-12-06 RX ADMIN — DEXAMETHASONE SODIUM PHOSPHATE 8 MG: 4 INJECTION, SOLUTION INTRA-ARTICULAR; INTRALESIONAL; INTRAMUSCULAR; INTRAVENOUS; SOFT TISSUE at 15:29

## 2018-12-06 RX ADMIN — GLYCOPYRROLATE 0.2 MG: 0.2 INJECTION INTRAMUSCULAR; INTRAVENOUS at 15:17

## 2018-12-06 RX ADMIN — SODIUM CHLORIDE, SODIUM LACTATE, POTASSIUM CHLORIDE, AND CALCIUM CHLORIDE 100 ML/HR: 600; 310; 30; 20 INJECTION, SOLUTION INTRAVENOUS at 12:34

## 2018-12-06 RX ADMIN — EPHEDRINE SULFATE 5 MG: 50 INJECTION, SOLUTION INTRAVENOUS at 15:20

## 2018-12-06 RX ADMIN — OXYCODONE HYDROCHLORIDE 5 MG: 5 TABLET ORAL at 16:18

## 2018-12-06 RX ADMIN — ROCURONIUM BROMIDE 10 MG: 10 INJECTION, SOLUTION INTRAVENOUS at 14:52

## 2018-12-06 RX ADMIN — FAMOTIDINE 20 MG: 20 TABLET ORAL at 12:43

## 2018-12-06 RX ADMIN — ONDANSETRON 4 MG: 2 INJECTION INTRAMUSCULAR; INTRAVENOUS at 15:29

## 2018-12-06 RX ADMIN — MIDAZOLAM HYDROCHLORIDE 2 MG: 2 INJECTION, SOLUTION INTRAMUSCULAR; INTRAVENOUS at 12:43

## 2018-12-06 RX ADMIN — ONDANSETRON 4 MG: 2 INJECTION INTRAMUSCULAR; INTRAVENOUS at 14:50

## 2018-12-06 RX ADMIN — Medication 2 G: at 14:48

## 2018-12-06 RX ADMIN — PROPOFOL 200 MG: 10 INJECTION, EMULSION INTRAVENOUS at 14:52

## 2018-12-06 RX ADMIN — KETOROLAC TROMETHAMINE 30 MG: 30 INJECTION, SOLUTION INTRAMUSCULAR; INTRAVENOUS at 15:30

## 2018-12-06 RX ADMIN — LIDOCAINE HYDROCHLORIDE 100 MG: 20 INJECTION, SOLUTION EPIDURAL; INFILTRATION; INTRACAUDAL; PERINEURAL at 14:52

## 2018-12-06 RX ADMIN — ROCURONIUM BROMIDE 10 MG: 10 INJECTION, SOLUTION INTRAVENOUS at 15:08

## 2018-12-06 RX ADMIN — EPHEDRINE SULFATE 10 MG: 50 INJECTION, SOLUTION INTRAVENOUS at 15:18

## 2018-12-06 RX ADMIN — SUCCINYLCHOLINE CHLORIDE 200 MG: 20 INJECTION INTRAMUSCULAR; INTRAVENOUS at 14:52

## 2018-12-06 RX ADMIN — FENTANYL CITRATE 100 MCG: 50 INJECTION, SOLUTION INTRAMUSCULAR; INTRAVENOUS at 14:52

## 2018-12-06 NOTE — ANESTHESIA PREPROCEDURE EVALUATION
Anesthetic History No history of anesthetic complications Review of Systems / Medical History Patient summary reviewed and pertinent labs reviewed Pulmonary Within defined limits Neuro/Psych Within defined limits Cardiovascular Hypertension Exercise tolerance: >4 METS Comments: Denies recent CP, SOB or Palpitations GI/Hepatic/Renal 
  
GERD: well controlled Endo/Other Obesity Other Findings Comments: Fibromyalgia Physical Exam 
 
Airway Mallampati: II 
TM Distance: > 6 cm Neck ROM: normal range of motion Mouth opening: Normal 
 
 Cardiovascular Regular rate and rhythm,  S1 and S2 normal,  no murmur, click, rub, or gallop Dental 
No notable dental hx Pulmonary Breath sounds clear to auscultation Abdominal 
GI exam deferred Other Findings Anesthetic Plan ASA: 3 Anesthesia type: general 
 
 
 
 
Induction: Intravenous Anesthetic plan and risks discussed with: Patient

## 2018-12-06 NOTE — BRIEF OP NOTE
BRIEF OPERATIVE NOTE Date of Procedure: 12/6/2018 Preoperative Diagnosis: Calculus of gallbladder without cholecystitis without obstruction [K80.20] Postoperative Diagnosis: Calculus of gallbladder without cholecystitis without obstruction [K80.20] Procedure(s): CHOLECYSTECTOMY LAPAROSCOPIC WITH POSS INTRAOP CHOLANGIOGRAM 
Surgeon(s) and Role: * Jason Vieira, DO - Primary Surgical Assistant: None Surgical Staff: 
Circ-1: Khadra Hernandez RN 
Circ-Relief: Beny Marcos RN Scrub Tech-1: Vena Basset Scrub Tech-2: Diana Demarco Event Time In Time Out Incision Start 6166 Incision Close Anesthesia: General  
Estimated Blood Loss: minimal 
Specimens:  
ID Type Source Tests Collected by Time Destination 1 : Gallbladder Preservative Gallbladder  Lawerence Riding, DO 12/6/2018 1530 Pathology Findings: thin walled GB, normal appearing liver, 3 mm cystic duct Complications: none Implants: * No implants in log * Jason Zabala, [address] [place] [address]

## 2018-12-06 NOTE — ANESTHESIA POSTPROCEDURE EVALUATION
Procedure(s): CHOLECYSTECTOMY LAPAROSCOPIC WITH POSS INTRAOP CHOLANGIOGRAM. Anesthesia Post Evaluation Multimodal analgesia: multimodal analgesia used between 6 hours prior to anesthesia start to PACU discharge Patient location during evaluation: PACU Patient participation: complete - patient participated Level of consciousness: awake and alert Pain management: adequate Airway patency: patent Anesthetic complications: no 
Cardiovascular status: acceptable Respiratory status: acceptable Hydration status: acceptable Post anesthesia nausea and vomiting:  none Visit Vitals /71 Pulse (!) 55 Temp 37.1 °C (98.8 °F) Resp 18 Ht 5' 5\" (1.651 m) Wt 101.2 kg (223 lb) SpO2 93% BMI 37.11 kg/m²

## 2018-12-06 NOTE — DISCHARGE INSTRUCTIONS
Post op instructions:  1. May shower only, no tub bathing, no hot tub, no swimming. 2. Keep incision clean with regular soap and water. 3. No lifting over 10 lbs. 4. Regular diet as tolerated. 5. May remove topical dressing on Day #2. Leave steri strips until seen in Dr. Vieira's office at follow up appointment. 6. No driving on pain medicines. 7. Resume home medicines as usual.     Lara Dan, DO         Cholecystectomy: What to Expect at [address] [place] [address]  After your surgery, it is normal to feel weak and tired for several days after you return home. Your belly may be swollen. If you had laparoscopic surgery, you may also have pain in your shoulder for about 24 hours. You may have gas or need to burp a lot at first, and a few people get diarrhea. The diarrhea usually goes away in 2 to 4 weeks, but it may last longer. How quickly you recover depends on whether you had a laparoscopic or open surgery. · For a laparoscopic surgery, most people can go back to work or their normal routine in 1 to 2 weeks, but it may take longer, depending on the type of work you do. · For an open surgery, it will probably take 4 to 6 weeks before you get back to your normal routine. This care sheet gives you a general idea about how long it will take for you to recover. However, each person recovers at a different pace. Follow the steps below to get better as quickly as possible. How can you care for yourself at home? Activity    · Rest when you feel tired. Getting enough sleep will help you recover.     · Try to walk each day. Start out by walking a little more than you did the day before. Gradually increase the amount you walk. Walking boosts blood flow and helps prevent pneumonia and constipation.     · For about 2 to 4 weeks, avoid lifting anything that would make you strain.  This may include a child, heavy grocery bags and milk containers, a heavy briefcase or backpack, cat litter or dog food bags, or a vacuum .     · Avoid strenuous activities, such as biking, jogging, weightlifting, and aerobic exercise, until your doctor says it is okay.     · You may shower 24 to 48 hours after surgery, if your doctor okays it. Pat the cut (incision) dry. Do not take a bath for the first 2 weeks, or until your doctor tells you it is okay.     · You may drive when you are no longer taking pain medicine and can quickly move your foot from the gas pedal to the brake. You must also be able to sit comfortably for a long period of time, even if you do not plan to go far. You might get caught in traffic.     · For a laparoscopic surgery, most people can go back to work or their normal routine in 1 to 2 weeks, but it may take longer. For an open surgery, it will probably take 4 to 6 weeks before you get back to your normal routine.     · Your doctor will tell you when you can have sex again.    Diet    · Eat smaller meals more often instead of fewer larger meals. You can eat a normal diet, but avoid eating fatty foods for about 1 month. Fatty foods include hamburger, whole milk, cheese, and many snack foods. If your stomach is upset, try bland, low-fat foods like plain rice, broiled chicken, toast, and yogurt.     · Drink plenty of fluids (unless your doctor tells you not to).   · If you have diarrhea, try avoiding spicy foods, dairy products, fatty foods, and alcohol. You can also watch to see if specific foods cause it, and stop eating them. If the diarrhea continues for more than 2 weeks, talk to your doctor.     · You may notice that your bowel movements are not regular right after your surgery. This is common. Try to avoid constipation and straining with bowel movements. You may want to take a fiber supplement every day. If you have not had a bowel movement after a couple of days, ask your doctor about taking a mild laxative. Medicines    · Your doctor will tell you if and when you can restart your medicines.  He or she will also give you instructions about taking any new medicines.     · If you take blood thinners, such as warfarin (Coumadin), clopidogrel (Plavix), or aspirin, be sure to talk to your doctor. He or she will tell you if and when to start taking those medicines again. Make sure that you understand exactly what your doctor wants you to do.     · Take pain medicines exactly as directed. ? If the doctor gave you a prescription medicine for pain, take it as prescribed. ? If you are not taking a prescription pain medicine, take an over-the-counter medicine such as acetaminophen (Tylenol), ibuprofen (Advil, Motrin), or naproxen (Aleve). Read and follow all instructions on the label. ? Do not take two or more pain medicines at the same time unless the doctor told you to. Many pain medicines contain acetaminophen, which is Tylenol. Too much Tylenol can be harmful.     · If you think your pain medicine is making you sick to your stomach:  ? Take your medicine after meals (unless your doctor tells you not to). ? Ask your doctor for a different pain medicine.     · If your doctor prescribed antibiotics, take them as directed. Do not stop taking them just because you feel better. You need to take the full course of antibiotics. Incision care    · If you have strips of tape on the incision, or cut, leave the tape on for a week or until it falls off.     · After 24 to 48 hours, wash the area daily with warm, soapy water, and pat it dry.     · You may have staples to hold the cut together. Keep them dry until your doctor takes them out. This is usually in 7 to 10 days.     · Keep the area clean and dry. You may cover it with a gauze bandage if it weeps or rubs against clothing. Change the bandage every day.    Ice    · To reduce swelling and pain, put ice or a cold pack on your belly for 10 to 20 minutes at a time. Do this every 1 to 2 hours. Put a thin cloth between the ice and your skin.    Follow-up care is a key part of your treatment and safety. Be sure to make and go to all appointments, and call your doctor if you are having problems. It's also a good idea to know your test results and keep a list of the medicines you take. When should you call for help? Call 911 anytime you think you may need emergency care. For example, call if:    · You passed out (lost consciousness).     · You are short of breath. Boni [place]ornstad your doctor now or seek immediate medical care if:    · You are sick to your stomach and cannot drink fluids.     · You have pain that does not get better when you take your pain medicine.     · You cannot pass stools or gas.     · You have signs of infection, such as:  ? Increased pain, swelling, warmth, or redness. ? Red streaks leading from the incision. ? Pus draining from the incision. ? A fever.     · Bright red blood has soaked through the bandage over your incision.     · You have loose stitches, or your incision comes open.     · You have signs of a blood clot in your leg (called a deep vein thrombosis), such as:  ? Pain in your calf, back of knee, thigh, or groin. ? Redness and swelling in your leg or groin.    Watch closely for any changes in your health, and be sure to contact your doctor if you have any problems. Where can you learn more? Go to http://mani-maria dolores.info/. Enter 313 89 407 in the search box to learn more about \"Cholecystectomy: What to Expect at Home. \"  Current as of: March 28, 2018  Content Version: 11.8  © 7497-0796 [employer]. Care instructions adapted under license by [employer] (which disclaims liability or warranty for this information). If you have questions about a medical condition or this instruction, always ask your healthcare professional. [place] [address] any warranty or liability for your use of this information.     After general anesthesia or intravenous sedation, for 24 hours or while taking prescription Narcotics:  · Limit your activities  · Do not drive and operate hazardous machinery  · Do not make important personal or business decisions  · Do  not drink alcoholic beverages  · If you have not urinated within 8 hours after discharge, please contact your surgeon on call. *  Please give a list of your current medications to your Primary Care Provider. *  Please update this list whenever your medications are discontinued, doses are      changed, or new medications (including over-the-counter products) are added. *  Please carry medication information at all times in case of emergency situations. These are general instructions for a healthy lifestyle:  No smoking/ No tobacco products/ Avoid exposure to second hand smoke  Surgeon General's Warning:  Quitting smoking now greatly reduces serious risk to your health. Obesity, smoking, and sedentary lifestyle greatly increases your risk for illness  A healthy diet, regular physical exercise & weight monitoring are important for maintaining a healthy lifestyle    You may be retaining fluid if you have a history of heart failure or if you experience any of the following symptoms:  Weight gain of 3 pounds or more overnight or 5 pounds in a week, increased swelling in our hands or feet or shortness of breath while lying flat in bed. Please call your doctor as soon as you notice any of these symptoms; do not wait until your next office visit. Recognize signs and symptoms of STROKE:  F-face looks uneven  A-arms unable to move or move unevenly  S-speech slurred or non-existent  T-time-call 911 as soon as signs and symptoms begin-DO NOT go       Back to bed or wait to see if you get better-TIME IS BRAIN.

## 2018-12-06 NOTE — INTERVAL H&P NOTE
H&P Update: 
Luis Miguel Amador was seen and examined. History and physical has been reviewed. The patient has been examined.  There have been no significant clinical changes since the completion of the originally dated History and Physical. 
 
Signed By: Airam Peters DO   
 December 6, 2018 1:01 PM

## 2018-12-19 PROBLEM — Z90.49 S/P LAPAROSCOPIC CHOLECYSTECTOMY: Status: ACTIVE | Noted: 2018-12-19

## 2019-11-19 ENCOUNTER — HOSPITAL ENCOUNTER (OUTPATIENT)
Dept: MAMMOGRAPHY | Age: 54
Discharge: HOME | End: 2019-11-19
Attending: OBSTETRICS & GYNECOLOGY

## 2019-11-19 DIAGNOSIS — Z12.31 VISIT FOR SCREENING MAMMOGRAM: ICD-10-CM

## 2020-07-23 ENCOUNTER — HOSPITAL ENCOUNTER (OUTPATIENT)
Dept: GENERAL RADIOLOGY | Age: 55
Discharge: HOME | End: 2020-07-23
Payer: COMMERCIAL

## 2020-07-23 DIAGNOSIS — M06.4 INFLAMMATORY POLYARTHRITIS (HCC): ICD-10-CM

## 2020-07-23 PROCEDURE — 73130 X-RAY EXAM OF HAND: CPT

## 2020-07-23 PROCEDURE — 73630 X-RAY EXAM OF FOOT: CPT

## 2021-12-11 ENCOUNTER — HOSPITAL ENCOUNTER (INPATIENT)
Age: 56
LOS: 15 days | Discharge: HOME HEALTH | DRG: 871 | End: 2021-12-27
Attending: EMERGENCY MEDICINE | Admitting: EMERGENCY MEDICINE
Payer: COMMERCIAL

## 2021-12-11 ENCOUNTER — APPOINTMENT (OUTPATIENT)
Dept: GENERAL RADIOLOGY | Age: 56
DRG: 871 | End: 2021-12-11
Attending: EMERGENCY MEDICINE
Payer: COMMERCIAL

## 2021-12-11 DIAGNOSIS — U07.1 PNEUMONIA DUE TO COVID-19 VIRUS: Primary | ICD-10-CM

## 2021-12-11 DIAGNOSIS — J12.82 PNEUMONIA DUE TO COVID-19 VIRUS: Primary | ICD-10-CM

## 2021-12-11 LAB
ALBUMIN SERPL-MCNC: 3.1 G/DL (ref 3.5–5)
ALBUMIN/GLOB SERPL: 0.8 {RATIO} (ref 1.2–3.5)
ALP SERPL-CCNC: 64 U/L (ref 50–136)
ALT SERPL-CCNC: 60 U/L (ref 12–65)
ANION GAP SERPL CALC-SCNC: 10 MMOL/L (ref 7–16)
AST SERPL-CCNC: 44 U/L (ref 15–37)
BASOPHILS # BLD: 0 K/UL (ref 0–0.2)
BASOPHILS NFR BLD: 0 % (ref 0–2)
BILIRUB SERPL-MCNC: 0.3 MG/DL (ref 0.2–1.1)
BUN SERPL-MCNC: 6 MG/DL (ref 6–23)
CALCIUM SERPL-MCNC: 8.6 MG/DL (ref 8.3–10.4)
CHLORIDE SERPL-SCNC: 103 MMOL/L (ref 98–107)
CO2 SERPL-SCNC: 25 MMOL/L (ref 21–32)
CREAT SERPL-MCNC: 0.71 MG/DL (ref 0.6–1)
DIFFERENTIAL METHOD BLD: ABNORMAL
EOSINOPHIL # BLD: 0 K/UL (ref 0–0.8)
EOSINOPHIL NFR BLD: 0 % (ref 0.5–7.8)
ERYTHROCYTE [DISTWIDTH] IN BLOOD BY AUTOMATED COUNT: 12.4 % (ref 11.9–14.6)
GLOBULIN SER CALC-MCNC: 4 G/DL (ref 2.3–3.5)
GLUCOSE SERPL-MCNC: 117 MG/DL (ref 65–100)
HCT VFR BLD AUTO: 42.4 % (ref 35.8–46.3)
HGB BLD-MCNC: 14.2 G/DL (ref 11.7–15.4)
IMM GRANULOCYTES # BLD AUTO: 0 K/UL (ref 0–0.5)
IMM GRANULOCYTES NFR BLD AUTO: 0 % (ref 0–5)
LACTATE SERPL-SCNC: 1.2 MMOL/L (ref 0.4–2)
LYMPHOCYTES # BLD: 0.6 K/UL (ref 0.5–4.6)
LYMPHOCYTES NFR BLD: 19 % (ref 13–44)
MCH RBC QN AUTO: 28.7 PG (ref 26.1–32.9)
MCHC RBC AUTO-ENTMCNC: 33.5 G/DL (ref 31.4–35)
MCV RBC AUTO: 85.7 FL (ref 79.6–97.8)
MONOCYTES # BLD: 0.2 K/UL (ref 0.1–1.3)
MONOCYTES NFR BLD: 6 % (ref 4–12)
NEUTS SEG # BLD: 2.6 K/UL (ref 1.7–8.2)
NEUTS SEG NFR BLD: 75 % (ref 43–78)
NRBC # BLD: 0 K/UL (ref 0–0.2)
PLATELET # BLD AUTO: 141 K/UL (ref 150–450)
PMV BLD AUTO: 10.1 FL (ref 9.4–12.3)
POTASSIUM SERPL-SCNC: 3.3 MMOL/L (ref 3.5–5.1)
PROCALCITONIN SERPL-MCNC: <0.05 NG/ML (ref 0–0.49)
PROT SERPL-MCNC: 7.1 G/DL (ref 6.3–8.2)
RBC # BLD AUTO: 4.95 M/UL (ref 4.05–5.2)
SODIUM SERPL-SCNC: 138 MMOL/L (ref 136–145)
WBC # BLD AUTO: 3.5 K/UL (ref 4.3–11.1)

## 2021-12-11 PROCEDURE — 71045 X-RAY EXAM CHEST 1 VIEW: CPT

## 2021-12-11 PROCEDURE — 80053 COMPREHEN METABOLIC PANEL: CPT

## 2021-12-11 PROCEDURE — 93005 ELECTROCARDIOGRAM TRACING: CPT | Performed by: EMERGENCY MEDICINE

## 2021-12-11 PROCEDURE — 87040 BLOOD CULTURE FOR BACTERIA: CPT

## 2021-12-11 PROCEDURE — 84145 PROCALCITONIN (PCT): CPT

## 2021-12-11 PROCEDURE — 85025 COMPLETE CBC W/AUTO DIFF WBC: CPT

## 2021-12-11 PROCEDURE — 74011250636 HC RX REV CODE- 250/636: Performed by: EMERGENCY MEDICINE

## 2021-12-11 PROCEDURE — 74011250637 HC RX REV CODE- 250/637: Performed by: EMERGENCY MEDICINE

## 2021-12-11 PROCEDURE — 86140 C-REACTIVE PROTEIN: CPT

## 2021-12-11 PROCEDURE — 99285 EMERGENCY DEPT VISIT HI MDM: CPT

## 2021-12-11 PROCEDURE — XW0DXM6 INTRODUCTION OF BARICITINIB INTO MOUTH AND PHARYNX, EXTERNAL APPROACH, NEW TECHNOLOGY GROUP 6: ICD-10-PCS | Performed by: EMERGENCY MEDICINE

## 2021-12-11 PROCEDURE — 83605 ASSAY OF LACTIC ACID: CPT

## 2021-12-11 PROCEDURE — 81003 URINALYSIS AUTO W/O SCOPE: CPT

## 2021-12-11 RX ORDER — ACETAMINOPHEN 500 MG
1000 TABLET ORAL
Status: COMPLETED | OUTPATIENT
Start: 2021-12-11 | End: 2021-12-11

## 2021-12-11 RX ORDER — SODIUM CHLORIDE 0.9 % (FLUSH) 0.9 %
5-10 SYRINGE (ML) INJECTION EVERY 8 HOURS
Status: DISCONTINUED | OUTPATIENT
Start: 2021-12-11 | End: 2021-12-27 | Stop reason: HOSPADM

## 2021-12-11 RX ORDER — SODIUM CHLORIDE 0.9 % (FLUSH) 0.9 %
5-10 SYRINGE (ML) INJECTION AS NEEDED
Status: DISCONTINUED | OUTPATIENT
Start: 2021-12-11 | End: 2021-12-27 | Stop reason: HOSPADM

## 2021-12-11 RX ADMIN — Medication 10 ML: at 23:47

## 2021-12-11 RX ADMIN — SODIUM CHLORIDE, SODIUM LACTATE, POTASSIUM CHLORIDE, AND CALCIUM CHLORIDE 1000 ML: 600; 310; 30; 20 INJECTION, SOLUTION INTRAVENOUS at 23:47

## 2021-12-11 RX ADMIN — ACETAMINOPHEN 1000 MG: 500 TABLET ORAL at 22:52

## 2021-12-12 PROBLEM — J12.82 PNEUMONIA DUE TO COVID-19 VIRUS: Status: ACTIVE | Noted: 2021-12-12

## 2021-12-12 PROBLEM — L40.50 PSORIATIC ARTHRITIS (HCC): Status: ACTIVE | Noted: 2021-12-12

## 2021-12-12 PROBLEM — U07.1 PNEUMONIA DUE TO COVID-19 VIRUS: Status: ACTIVE | Noted: 2021-12-12

## 2021-12-12 LAB
ALBUMIN SERPL-MCNC: 2.8 G/DL (ref 3.5–5)
ALBUMIN/GLOB SERPL: 0.8 {RATIO} (ref 1.2–3.5)
ALP SERPL-CCNC: 62 U/L (ref 50–136)
ALT SERPL-CCNC: 54 U/L (ref 12–65)
ANION GAP SERPL CALC-SCNC: 9 MMOL/L (ref 7–16)
APTT PPP: 32.5 SEC (ref 24.1–35.1)
AST SERPL-CCNC: 39 U/L (ref 15–37)
BASOPHILS # BLD: 0 K/UL (ref 0–0.2)
BASOPHILS NFR BLD: 0 % (ref 0–2)
BILIRUB SERPL-MCNC: 0.3 MG/DL (ref 0.2–1.1)
BUN SERPL-MCNC: 7 MG/DL (ref 6–23)
CALCIUM SERPL-MCNC: 8.6 MG/DL (ref 8.3–10.4)
CHLORIDE SERPL-SCNC: 103 MMOL/L (ref 98–107)
CO2 SERPL-SCNC: 28 MMOL/L (ref 21–32)
COVID-19 RAPID TEST, COVR: DETECTED
CREAT SERPL-MCNC: 0.72 MG/DL (ref 0.6–1)
CRP SERPL-MCNC: 7.9 MG/DL (ref 0–0.9)
CRP SERPL-MCNC: 8.3 MG/DL (ref 0–0.9)
D DIMER PPP FEU-MCNC: 1.15 UG/ML(FEU)
DIFFERENTIAL METHOD BLD: ABNORMAL
EOSINOPHIL # BLD: 0 K/UL (ref 0–0.8)
EOSINOPHIL NFR BLD: 0 % (ref 0.5–7.8)
ERYTHROCYTE [DISTWIDTH] IN BLOOD BY AUTOMATED COUNT: 12.4 % (ref 11.9–14.6)
FERRITIN SERPL-MCNC: 686 NG/ML (ref 8–388)
FIBRINOGEN PPP-MCNC: 569 MG/DL (ref 190–501)
GLOBULIN SER CALC-MCNC: 3.7 G/DL (ref 2.3–3.5)
GLUCOSE SERPL-MCNC: 144 MG/DL (ref 65–100)
HCT VFR BLD AUTO: 38.7 % (ref 35.8–46.3)
HGB BLD-MCNC: 12.8 G/DL (ref 11.7–15.4)
IMM GRANULOCYTES # BLD AUTO: 0 K/UL (ref 0–0.5)
IMM GRANULOCYTES NFR BLD AUTO: 1 % (ref 0–5)
INR PPP: 1
LDH SERPL L TO P-CCNC: 339 U/L (ref 100–190)
LYMPHOCYTES # BLD: 0.3 K/UL (ref 0.5–4.6)
LYMPHOCYTES NFR BLD: 14 % (ref 13–44)
MAGNESIUM SERPL-MCNC: 2.1 MG/DL (ref 1.8–2.4)
MCH RBC QN AUTO: 28.9 PG (ref 26.1–32.9)
MCHC RBC AUTO-ENTMCNC: 33.1 G/DL (ref 31.4–35)
MCV RBC AUTO: 87.4 FL (ref 79.6–97.8)
MONOCYTES # BLD: 0.1 K/UL (ref 0.1–1.3)
MONOCYTES NFR BLD: 5 % (ref 4–12)
NEUTS SEG # BLD: 1.7 K/UL (ref 1.7–8.2)
NEUTS SEG NFR BLD: 80 % (ref 43–78)
NRBC # BLD: 0 K/UL (ref 0–0.2)
PHOSPHATE SERPL-MCNC: 3.2 MG/DL (ref 2.5–4.5)
PLATELET # BLD AUTO: 134 K/UL (ref 150–450)
PMV BLD AUTO: 10.3 FL (ref 9.4–12.3)
POTASSIUM SERPL-SCNC: 3.3 MMOL/L (ref 3.5–5.1)
PROT SERPL-MCNC: 6.5 G/DL (ref 6.3–8.2)
PROTHROMBIN TIME: 13.1 SEC (ref 12.6–14.5)
RBC # BLD AUTO: 4.43 M/UL (ref 4.05–5.2)
SODIUM SERPL-SCNC: 140 MMOL/L (ref 136–145)
SOURCE, COVRS: ABNORMAL
TROPONIN-HIGH SENSITIVITY: 14.4 PG/ML (ref 0–14)
WBC # BLD AUTO: 2.1 K/UL (ref 4.3–11.1)

## 2021-12-12 PROCEDURE — 83615 LACTATE (LD) (LDH) ENZYME: CPT

## 2021-12-12 PROCEDURE — 84100 ASSAY OF PHOSPHORUS: CPT

## 2021-12-12 PROCEDURE — 96374 THER/PROPH/DIAG INJ IV PUSH: CPT

## 2021-12-12 PROCEDURE — 97161 PT EVAL LOW COMPLEX 20 MIN: CPT

## 2021-12-12 PROCEDURE — 83735 ASSAY OF MAGNESIUM: CPT

## 2021-12-12 PROCEDURE — 97530 THERAPEUTIC ACTIVITIES: CPT

## 2021-12-12 PROCEDURE — 87635 SARS-COV-2 COVID-19 AMP PRB: CPT

## 2021-12-12 PROCEDURE — 84484 ASSAY OF TROPONIN QUANT: CPT

## 2021-12-12 PROCEDURE — 74011250636 HC RX REV CODE- 250/636: Performed by: EMERGENCY MEDICINE

## 2021-12-12 PROCEDURE — 85025 COMPLETE CBC W/AUTO DIFF WBC: CPT

## 2021-12-12 PROCEDURE — 74011250637 HC RX REV CODE- 250/637: Performed by: EMERGENCY MEDICINE

## 2021-12-12 PROCEDURE — 80053 COMPREHEN METABOLIC PANEL: CPT

## 2021-12-12 PROCEDURE — 85730 THROMBOPLASTIN TIME PARTIAL: CPT

## 2021-12-12 PROCEDURE — 85384 FIBRINOGEN ACTIVITY: CPT

## 2021-12-12 PROCEDURE — 86140 C-REACTIVE PROTEIN: CPT

## 2021-12-12 PROCEDURE — 74011250636 HC RX REV CODE- 250/636: Performed by: STUDENT IN AN ORGANIZED HEALTH CARE EDUCATION/TRAINING PROGRAM

## 2021-12-12 PROCEDURE — 85610 PROTHROMBIN TIME: CPT

## 2021-12-12 PROCEDURE — 82728 ASSAY OF FERRITIN: CPT

## 2021-12-12 PROCEDURE — 36415 COLL VENOUS BLD VENIPUNCTURE: CPT

## 2021-12-12 PROCEDURE — 85379 FIBRIN DEGRADATION QUANT: CPT

## 2021-12-12 PROCEDURE — 96361 HYDRATE IV INFUSION ADD-ON: CPT

## 2021-12-12 PROCEDURE — 87045 FECES CULTURE AEROBIC BACT: CPT

## 2021-12-12 PROCEDURE — 74011250637 HC RX REV CODE- 250/637: Performed by: HOSPITALIST

## 2021-12-12 PROCEDURE — 81003 URINALYSIS AUTO W/O SCOPE: CPT

## 2021-12-12 PROCEDURE — 65270000029 HC RM PRIVATE

## 2021-12-12 RX ORDER — SODIUM CHLORIDE 0.9 % (FLUSH) 0.9 %
5-40 SYRINGE (ML) INJECTION EVERY 8 HOURS
Status: DISCONTINUED | OUTPATIENT
Start: 2021-12-12 | End: 2021-12-18 | Stop reason: SDUPTHER

## 2021-12-12 RX ORDER — POLYETHYLENE GLYCOL 3350 17 G/17G
17 POWDER, FOR SOLUTION ORAL DAILY PRN
Status: DISCONTINUED | OUTPATIENT
Start: 2021-12-12 | End: 2021-12-27 | Stop reason: HOSPADM

## 2021-12-12 RX ORDER — GUAIFENESIN/DEXTROMETHORPHAN 100-10MG/5
5 SYRUP ORAL
Status: DISCONTINUED | OUTPATIENT
Start: 2021-12-12 | End: 2021-12-27 | Stop reason: HOSPADM

## 2021-12-12 RX ORDER — ENOXAPARIN SODIUM 100 MG/ML
30 INJECTION SUBCUTANEOUS EVERY 12 HOURS
Status: DISCONTINUED | OUTPATIENT
Start: 2021-12-12 | End: 2021-12-27 | Stop reason: HOSPADM

## 2021-12-12 RX ORDER — ENOXAPARIN SODIUM 100 MG/ML
30 INJECTION SUBCUTANEOUS EVERY 12 HOURS
Status: DISCONTINUED | OUTPATIENT
Start: 2021-12-12 | End: 2021-12-12

## 2021-12-12 RX ORDER — SERTRALINE HYDROCHLORIDE 50 MG/1
50 TABLET, FILM COATED ORAL DAILY
Status: DISCONTINUED | OUTPATIENT
Start: 2021-12-12 | End: 2021-12-13

## 2021-12-12 RX ORDER — ONDANSETRON 2 MG/ML
4 INJECTION INTRAMUSCULAR; INTRAVENOUS ONCE
Status: COMPLETED | OUTPATIENT
Start: 2021-12-12 | End: 2021-12-12

## 2021-12-12 RX ORDER — DICLOFENAC SODIUM 10 MG/G
1-2 GEL TOPICAL
Status: DISCONTINUED | OUTPATIENT
Start: 2021-12-12 | End: 2021-12-27 | Stop reason: HOSPADM

## 2021-12-12 RX ORDER — POTASSIUM CHLORIDE 20 MEQ/1
20 TABLET, EXTENDED RELEASE ORAL
Status: COMPLETED | OUTPATIENT
Start: 2021-12-12 | End: 2021-12-12

## 2021-12-12 RX ORDER — DICLOFENAC SODIUM 10 MG/G
1-2 GEL TOPICAL 3 TIMES DAILY
Status: DISCONTINUED | OUTPATIENT
Start: 2021-12-12 | End: 2021-12-12

## 2021-12-12 RX ORDER — DEXAMETHASONE SODIUM PHOSPHATE 100 MG/10ML
6 INJECTION INTRAMUSCULAR; INTRAVENOUS
Status: COMPLETED | OUTPATIENT
Start: 2021-12-12 | End: 2021-12-12

## 2021-12-12 RX ORDER — SODIUM CHLORIDE 9 MG/ML
50 INJECTION, SOLUTION INTRAVENOUS CONTINUOUS
Status: DISCONTINUED | OUTPATIENT
Start: 2021-12-12 | End: 2021-12-12

## 2021-12-12 RX ORDER — DEXAMETHASONE SODIUM PHOSPHATE 100 MG/10ML
6 INJECTION INTRAMUSCULAR; INTRAVENOUS EVERY 24 HOURS
Status: DISCONTINUED | OUTPATIENT
Start: 2021-12-12 | End: 2021-12-13

## 2021-12-12 RX ORDER — SODIUM CHLORIDE AND POTASSIUM CHLORIDE .9; .15 G/100ML; G/100ML
SOLUTION INTRAVENOUS CONTINUOUS
Status: DISCONTINUED | OUTPATIENT
Start: 2021-12-12 | End: 2021-12-13

## 2021-12-12 RX ORDER — PRAMIPEXOLE DIHYDROCHLORIDE 0.12 MG/1
0.12 TABLET ORAL
COMMUNITY

## 2021-12-12 RX ORDER — ONDANSETRON 4 MG/1
4 TABLET, ORALLY DISINTEGRATING ORAL
Status: DISCONTINUED | OUTPATIENT
Start: 2021-12-12 | End: 2021-12-27 | Stop reason: HOSPADM

## 2021-12-12 RX ORDER — BENZONATATE 100 MG/1
100 CAPSULE ORAL
Status: DISCONTINUED | OUTPATIENT
Start: 2021-12-12 | End: 2021-12-27 | Stop reason: HOSPADM

## 2021-12-12 RX ORDER — ATENOLOL 50 MG/1
50 TABLET ORAL DAILY
Status: DISCONTINUED | OUTPATIENT
Start: 2021-12-12 | End: 2021-12-27 | Stop reason: HOSPADM

## 2021-12-12 RX ORDER — ACETAMINOPHEN 325 MG/1
650 TABLET ORAL
Status: DISCONTINUED | OUTPATIENT
Start: 2021-12-12 | End: 2021-12-27 | Stop reason: HOSPADM

## 2021-12-12 RX ORDER — SODIUM CHLORIDE 0.9 % (FLUSH) 0.9 %
5-40 SYRINGE (ML) INJECTION AS NEEDED
Status: DISCONTINUED | OUTPATIENT
Start: 2021-12-12 | End: 2021-12-27 | Stop reason: HOSPADM

## 2021-12-12 RX ORDER — POTASSIUM CHLORIDE 14.9 MG/ML
10 INJECTION INTRAVENOUS
Status: DISCONTINUED | OUTPATIENT
Start: 2021-12-12 | End: 2021-12-12

## 2021-12-12 RX ORDER — DULOXETIN HYDROCHLORIDE 60 MG/1
60 CAPSULE, DELAYED RELEASE ORAL DAILY
COMMUNITY
End: 2021-12-27

## 2021-12-12 RX ORDER — POTASSIUM CHLORIDE 20 MEQ/1
40 TABLET, EXTENDED RELEASE ORAL
Status: DISCONTINUED | OUTPATIENT
Start: 2021-12-12 | End: 2021-12-12

## 2021-12-12 RX ORDER — ACETAMINOPHEN 650 MG/1
650 SUPPOSITORY RECTAL
Status: DISCONTINUED | OUTPATIENT
Start: 2021-12-12 | End: 2021-12-27 | Stop reason: HOSPADM

## 2021-12-12 RX ORDER — ONDANSETRON 2 MG/ML
4 INJECTION INTRAMUSCULAR; INTRAVENOUS
Status: DISCONTINUED | OUTPATIENT
Start: 2021-12-12 | End: 2021-12-27 | Stop reason: HOSPADM

## 2021-12-12 RX ORDER — TRAMADOL HYDROCHLORIDE 50 MG/1
50 TABLET ORAL
Status: DISCONTINUED | OUTPATIENT
Start: 2021-12-12 | End: 2021-12-27 | Stop reason: HOSPADM

## 2021-12-12 RX ADMIN — POTASSIUM CHLORIDE 10 MEQ: 200 INJECTION, SOLUTION INTRAVENOUS at 18:53

## 2021-12-12 RX ADMIN — BARICITINIB 4 MG: 2 TABLET, FILM COATED ORAL at 09:18

## 2021-12-12 RX ADMIN — Medication 10 ML: at 13:23

## 2021-12-12 RX ADMIN — ONDANSETRON 4 MG: 2 INJECTION INTRAMUSCULAR; INTRAVENOUS at 20:35

## 2021-12-12 RX ADMIN — Medication 10 ML: at 21:27

## 2021-12-12 RX ADMIN — POTASSIUM CHLORIDE 20 MEQ: 20 TABLET, EXTENDED RELEASE ORAL at 21:26

## 2021-12-12 RX ADMIN — Medication 10 ML: at 06:18

## 2021-12-12 RX ADMIN — ENOXAPARIN SODIUM 30 MG: 100 INJECTION SUBCUTANEOUS at 06:23

## 2021-12-12 RX ADMIN — POTASSIUM CHLORIDE AND SODIUM CHLORIDE: 900; 150 INJECTION, SOLUTION INTRAVENOUS at 17:04

## 2021-12-12 RX ADMIN — ONDANSETRON 4 MG: 2 INJECTION INTRAMUSCULAR; INTRAVENOUS at 17:27

## 2021-12-12 RX ADMIN — DEXAMETHASONE SODIUM PHOSPHATE 6 MG: 10 INJECTION INTRAMUSCULAR; INTRAVENOUS at 21:27

## 2021-12-12 RX ADMIN — TRAMADOL HYDROCHLORIDE 50 MG: 50 TABLET ORAL at 20:38

## 2021-12-12 RX ADMIN — SERTRALINE 50 MG: 50 TABLET, FILM COATED ORAL at 09:18

## 2021-12-12 RX ADMIN — ATENOLOL 50 MG: 50 TABLET ORAL at 09:18

## 2021-12-12 RX ADMIN — ONDANSETRON 4 MG: 2 INJECTION INTRAMUSCULAR; INTRAVENOUS at 13:22

## 2021-12-12 RX ADMIN — ENOXAPARIN SODIUM 30 MG: 100 INJECTION SUBCUTANEOUS at 21:26

## 2021-12-12 RX ADMIN — DEXAMETHASONE SODIUM PHOSPHATE 6 MG: 10 INJECTION INTRAMUSCULAR; INTRAVENOUS at 01:06

## 2021-12-12 RX ADMIN — ACETAMINOPHEN 650 MG: 325 TABLET ORAL at 17:26

## 2021-12-12 NOTE — ASSESSMENT & PLAN NOTE
Continue Voltaren gel and tramadol as prescribed at home. Will monitor and adjust treatment as necessary. Patient also reports she takes Cymbalta.

## 2021-12-12 NOTE — PROGRESS NOTES
History and Physical          Hospitalist History and Physical   Admit Date:  2021 10:33 PM   Name:  Gurdeep Coles   Age:  64 y.o. Sex:  female  :  1965   MRN:  [id number]   Room:  [facility]    Presenting Complaint: Positive For Covid-19    Reason(s) for Admission: COVID pneumonia with hypoxia     History of Present Illness:   Gurdeep Coles is a 64 y.o. female with medical history of hypertension controlled with a single agent, fibromyalgia, psoriatic arthritis who was on sulfasalazine for a while but has not been on it in several years and so is not immunosuppressed, who is unvaccinated for Covid, and who presented with complaint of 1 week of cough, congestion, progressing to shortness of breath, weakness, fatigue, malaise, fever to 103, with onset of symptoms on 2021, seen in emergency MD for dehydration given 2 L of fluid and received a send off Covid test on Wednesday, 2021 followed by return visit on Thursday, 2021 for 2 more bags of IV fluids per her report. She found out on Friday that her Covid was positive. She comes here tonight with worsening shortness of breath and is noted to be hypoxic with any level of exertion requiring admission and oxygen support. She denies chest pain, productive cough although she does report a cough, nausea with the exception of one episode of nausea and vomiting when she arrived here tonight, vomiting with exception to that one episode, abdominal pain, constipation, focal numbness or weakness. She does report several episodes of diarrhea on the previous 2 days but not in the last 24 hours. Subjective  Patient is complaining of diarrhea and feeling tired. Patient denies chest pain, shortness of breath, palpitations, nausea, vomiting.    Assessment & Plan:     Principal Problem:  Pneumonia due to COVID-19 virus  Patient is saturating well on room air  Chest x-ray showed bilateral infiltrates  CRP 7.9  Lactate procalcitonin is normal  Continue Decadron. Baricitinib. Hypokalemia    Thrombocytopenia   Most likely due to Covid  platelet count is 599Z   Will monitor platelets    Diarrhea  Most likely due to Covid  Gentle Hydration  Follow-up with C. difficile  Follow-up with stool studies    Hypokalemia most likely due to poor oral intake    Potassium 3.3  Supplemented    Primary hypertension  Continue atenolol. Will monitor and adjust treatment as necessary. Psoriatic arthritis ([facility] [address]  Continue Voltaren gel and tramadol as prescribed at home. Will monitor and adjust treatment as necessary. Patient also reports she takes Cymbalta. Fibromyalgia  Continue tramadol and Cymbalta.       Dispo/Discharge Planning: Pending clinical stability    Diet: Low fat diet  VTE ppx: Lovenox  Code status: [address] Problems as of 12/12/2021 Date Reviewed: 12/12/2021          Codes Class Noted - Resolved POA    * (Principal) Pneumonia due to COVID-19 virus ICD-10-CM: U07.1, J12.82  ICD-9-CM: 480.8, 079.89  12/12/2021 - Present Yes        Psoriatic arthritis ([facility] [address] ICD-10-CM: L40.50  ICD-9-CM: 696.0  12/12/2021 - Present Yes        Primary hypertension ICD-10-CM: I10  ICD-9-CM: 401.9  5/24/2013 - Present Yes        Fibromyalgia ICD-10-CM: M79.7  ICD-9-CM: 729.1  5/24/2013 - Present Yes              Past History:  Past Medical History:   Diagnosis Date    Arthritis     Fibromyalgia     on med for control    Fibromyalgia     Fibromyalgia 2020    GERD (gastroesophageal reflux disease)     diet controlled    History of IBS     Hypertension     controlled with med    Liver disease 2011    elevated LFTs    Renal stone     Severe obesity ([facility] [address] 11/21/2018     Past Surgical History:   Procedure Laterality Date    HX CHOLECYSTECTOMY  12/06/2018    HX COLONOSCOPY  02/2018    and egd    HX HEENT  10yrs old    plastic surgery on lip    HX HYSTERECTOMY      HX ROTATOR CUFF REPAIR Right 2018    HX TOTAL LAPAROSCOPIC HYSTERECTOMY W/ BS&O 12/10/14    VICTORINO      Allergies   Allergen Reactions    Chlorhexidine Rash    Codeine Other (comments)     \"flushed\"   \"heart palpitations\"    Hibiclens [Chlorhexidine Gluconate] Rash    Prednisone Palpitations      Social History     Tobacco Use    Smoking status: Never Smoker    Smokeless tobacco: Never Used   Substance Use Topics    Alcohol use: No      Family History   Problem Relation Age of Onset    Cancer Father         lung    Hypertension Brother     Emphysema Mother     Cancer Mother     Cancer Sister         brain tumor    Cancer Sister         bladder cancer x 2    Stroke Sister     Liver Disease Sister     Heart Disease Brother     Cancer Brother         bladder cancer    Heart Attack Brother     Thyroid Disease Brother       Family history reviewed and negative except as otherwise noted. There is no immunization history on file for this patient.   Prior to Admit Medications:  Current Outpatient Medications   Medication Instructions    atenolol (TENORMIN) 50 mg tablet Oral, DAILY    diclofenac (VOLTAREN) 1-2 g, Topical, 3 TIMES DAILY    DULoxetine (CYMBALTA) 60 mg, Oral, DAILY    pramipexole (MIRAPEX) 0.5 mg, EVERY BEDTIME    pramipexole (MIRAPEX) 0.125 mg, Oral, DAILY AFTER DINNER    sertraline (ZOLOFT) 50 mg tablet DAILY    sulfaSALAzine (AZULFIDINE) 500 mg, 4 TIMES DAILY    traMADoL (ULTRAM) 50 mg, EVERY 6 HOURS AS NEEDED       Objective:     Patient Vitals for the past 24 hrs:   Temp Pulse Resp BP SpO2   12/12/21 1126 98.4 °F (36.9 °C) 71 20 112/68 94 %   12/12/21 0809 97.3 °F (36.3 °C) 74 18 105/72 94 %   12/12/21 0626     94 %   12/12/21 0544 97.5 °F (36.4 °C) 74 18 129/79 98 %   12/12/21 0445    112/79 98 %   12/12/21 0415    117/77 97 %   12/12/21 0350 98.2 °F (36.8 °C)       12/12/21 0242     98 %   12/12/21 0009     95 %   12/12/21 0007     95 %   12/12/21 0006     95 %   12/12/21 0005     94 %   12/12/21 0004     95 % 12/12/21 0003     94 %   12/12/21 0002     94 %   12/12/21 0001     95 %   12/12/21 0000     94 %   12/11/21 2359     95 %   12/11/21 2358     95 %   12/11/21 2357     94 %   12/11/21 2356     93 %   12/11/21 2355     94 %   12/11/21 2354     94 %   12/11/21 2353     94 %   12/11/21 2352     94 %   12/11/21 2351     94 %   12/11/21 2345 100.2 °F (37.9 °C) (!) 106 22  93 %   12/11/21 2240 (!) 103.1 °F (39.5 °C) (!) 124 22 (!) 156/85 94 %     Oxygen Therapy  O2 Sat (%): 94 % (12/12/21 1126)  Pulse via Oximetry: 69 beats per minute (12/12/21 0445)  O2 Device: None (Room air) (12/12/21 0626)  O2 Flow Rate (L/min): 1 l/min (12/12/21 0544)    Estimated body mass index is 35.51 kg/m² as calculated from the following:    Height as of this encounter: 5' 6\" (1.676 m). Weight as of this encounter: 99.8 kg (220 lb). Intake/Output Summary (Last 24 hours) at 12/12/2021 1533  Last data filed at 12/12/2021 1323  Gross per 24 hour   Intake 0 ml   Output    Net 0 ml         Physical Exam:    Blood pressure 112/68, pulse 71, temperature 98.4 °F (36.9 °C), resp. rate 20, height 5' 6\" (1.676 m), weight 99.8 kg (220 lb), last menstrual period 11/02/2014, SpO2 94 %, not currently breastfeeding. General:    Well nourished. Lying on stretcher in no acute distress in semireclined position. Nontoxic-appearing. Ill-appearing. Using nasal cannula with O2 at 1 L/min. Head:  Normocephalic, atraumatic  Eyes:  Sclerae appear normal.  Pupils equally round. ENT:  Nares appear normal, no drainage. Moist oral mucosa  Neck:  No restricted ROM. Trachea midline   CV:   RRR. No m/r/g. No jugular venous distension. Lungs:   Bibasilar rales and rhonchi. Good air movement throughout. Candido Rathke Respirations even, unlabored. No current use of accessory muscles of respiration noted. Abdomen: Bowel sounds present. Soft, nontender, nondistended. Extremities: No cyanosis or clubbing.   No edema  Skin:     No rashes and normal coloration. Warm and dry. Neuro:  CN II-XII grossly intact. Sensation intact. A&Ox3  Psych:  Normal mood and affect. I have reviewed ordered lab tests and independently visualized imaging below:    Last 24hr Labs:  Recent Results (from the past 24 hour(s))   LACTIC ACID    Collection Time: 12/11/21 10:56 PM   Result Value Ref Range    Lactic acid 1.2 0.4 - 2.0 MMOL/L   CBC WITH AUTOMATED DIFF    Collection Time: 12/11/21 10:56 PM   Result Value Ref Range    WBC 3.5 (L) 4.3 - 11.1 K/uL    RBC 4.95 4.05 - 5.2 M/uL    HGB 14.2 11.7 - 15.4 g/dL    HCT 42.4 35.8 - 46.3 %    MCV 85.7 79.6 - 97.8 FL    MCH 28.7 26.1 - 32.9 PG    MCHC 33.5 31.4 - 35.0 g/dL    RDW 12.4 11.9 - 14.6 %    PLATELET 959 (L) 153 - 450 K/uL    MPV 10.1 9.4 - 12.3 FL    ABSOLUTE NRBC 0.00 0.0 - 0.2 K/uL    DF AUTOMATED      NEUTROPHILS 75 43 - 78 %    LYMPHOCYTES 19 13 - 44 %    MONOCYTES 6 4.0 - 12.0 %    EOSINOPHILS 0 (L) 0.5 - 7.8 %    BASOPHILS 0 0.0 - 2.0 %    IMMATURE GRANULOCYTES 0 0.0 - 5.0 %    ABS. NEUTROPHILS 2.6 1.7 - 8.2 K/UL    ABS. LYMPHOCYTES 0.6 0.5 - 4.6 K/UL    ABS. MONOCYTES 0.2 0.1 - 1.3 K/UL    ABS. EOSINOPHILS 0.0 0.0 - 0.8 K/UL    ABS. BASOPHILS 0.0 0.0 - 0.2 K/UL    ABS. IMM. GRANS. 0.0 0.0 - 0.5 K/UL   METABOLIC PANEL, COMPREHENSIVE    Collection Time: 12/11/21 10:56 PM   Result Value Ref Range    Sodium 138 136 - 145 mmol/L    Potassium 3.3 (L) 3.5 - 5.1 mmol/L    Chloride 103 98 - 107 mmol/L    CO2 25 21 - 32 mmol/L    Anion gap 10 7 - 16 mmol/L    Glucose 117 (H) 65 - 100 mg/dL    BUN 6 6 - 23 MG/DL    Creatinine 0.71 0.6 - 1.0 MG/DL    GFR est AA >60 >60 ml/min/1.73m2    GFR est non-AA >60 >60 ml/min/1.73m2    Calcium 8.6 8.3 - 10.4 MG/DL    Bilirubin, total 0.3 0.2 - 1.1 MG/DL    ALT (SGPT) 60 12 - 65 U/L    AST (SGOT) 44 (H) 15 - 37 U/L    Alk.  phosphatase 64 50 - 136 U/L    Protein, total 7.1 6.3 - 8.2 g/dL    Albumin 3.1 (L) 3.5 - 5.0 g/dL    Globulin 4.0 (H) 2.3 - 3.5 g/dL    A-G Ratio 0.8 (L) 1.2 - 3.5     PROCALCITONIN    Collection Time: 12/11/21 10:56 PM   Result Value Ref Range    Procalcitonin <0.05 0.00 - 0.49 ng/mL   C REACTIVE PROTEIN, QT    Collection Time: 12/11/21 10:56 PM   Result Value Ref Range    C-Reactive protein 7.9 (H) 0.0 - 0.9 mg/dL   METABOLIC PANEL, COMPREHENSIVE    Collection Time: 12/12/21  6:37 AM   Result Value Ref Range    Sodium 140 136 - 145 mmol/L    Potassium 3.3 (L) 3.5 - 5.1 mmol/L    Chloride 103 98 - 107 mmol/L    CO2 28 21 - 32 mmol/L    Anion gap 9 7 - 16 mmol/L    Glucose 144 (H) 65 - 100 mg/dL    BUN 7 6 - 23 MG/DL    Creatinine 0.72 0.6 - 1.0 MG/DL    GFR est AA >60 >60 ml/min/1.73m2    GFR est non-AA >60 >60 ml/min/1.73m2    Calcium 8.6 8.3 - 10.4 MG/DL    Bilirubin, total 0.3 0.2 - 1.1 MG/DL    ALT (SGPT) 54 12 - 65 U/L    AST (SGOT) 39 (H) 15 - 37 U/L    Alk.  phosphatase 62 50 - 136 U/L    Protein, total 6.5 6.3 - 8.2 g/dL    Albumin 2.8 (L) 3.5 - 5.0 g/dL    Globulin 3.7 (H) 2.3 - 3.5 g/dL    A-G Ratio 0.8 (L) 1.2 - 3.5     C REACTIVE PROTEIN, QT    Collection Time: 12/12/21  6:37 AM   Result Value Ref Range    C-Reactive protein 8.3 (H) 0.0 - 0.9 mg/dL   FIBRINOGEN    Collection Time: 12/12/21  6:37 AM   Result Value Ref Range    Fibrinogen 569 (H) 190 - 501 mg/dL   PTT    Collection Time: 12/12/21  6:37 AM   Result Value Ref Range    aPTT 32.5 24.1 - 35.1 SEC   PROTHROMBIN TIME + INR    Collection Time: 12/12/21  6:37 AM   Result Value Ref Range    Prothrombin time 13.1 12.6 - 14.5 sec    INR 1.0     LD    Collection Time: 12/12/21  6:37 AM   Result Value Ref Range     (H) 100 - 190 U/L   FERRITIN    Collection Time: 12/12/21  6:37 AM   Result Value Ref Range    Ferritin 686 (H) 8 - 388 NG/ML   D DIMER    Collection Time: 12/12/21  6:37 AM   Result Value Ref Range    D DIMER 1.15 (H) <0.56 ug/ml(FEU)   TROPONIN-HIGH SENSITIVITY    Collection Time: 12/12/21  6:37 AM   Result Value Ref Range    Troponin-High Sensitivity 14.4 (H) 0 - 14 pg/mL   MAGNESIUM    Collection Time: 12/12/21  6:37 AM   Result Value Ref Range    Magnesium 2.1 1.8 - 2.4 mg/dL   PHOSPHORUS    Collection Time: 12/12/21  6:37 AM   Result Value Ref Range    Phosphorus 3.2 2.5 - 4.5 MG/DL   CBC WITH AUTOMATED DIFF    Collection Time: 12/12/21  7:43 AM   Result Value Ref Range    WBC 2.1 (L) 4.3 - 11.1 K/uL    RBC 4.43 4.05 - 5.2 M/uL    HGB 12.8 11.7 - 15.4 g/dL    HCT 38.7 35.8 - 46.3 %    MCV 87.4 79.6 - 97.8 FL    MCH 28.9 26.1 - 32.9 PG    MCHC 33.1 31.4 - 35.0 g/dL    RDW 12.4 11.9 - 14.6 %    PLATELET 668 (L) 766 - 450 K/uL    MPV 10.3 9.4 - 12.3 FL    ABSOLUTE NRBC 0.00 0.0 - 0.2 K/uL    DF AUTOMATED      NEUTROPHILS 80 (H) 43 - 78 %    LYMPHOCYTES 14 13 - 44 %    MONOCYTES 5 4.0 - 12.0 %    EOSINOPHILS 0 (L) 0.5 - 7.8 %    BASOPHILS 0 0.0 - 2.0 %    IMMATURE GRANULOCYTES 1 0.0 - 5.0 %    ABS. NEUTROPHILS 1.7 1.7 - 8.2 K/UL    ABS. LYMPHOCYTES 0.3 (L) 0.5 - 4.6 K/UL    ABS. MONOCYTES 0.1 0.1 - 1.3 K/UL    ABS. EOSINOPHILS 0.0 0.0 - 0.8 K/UL    ABS. BASOPHILS 0.0 0.0 - 0.2 K/UL    ABS. IMM. GRANS. 0.0 0.0 - 0.5 K/UL   COVID-19 RAPID TEST    Collection Time: 12/12/21  1:46 PM   Result Value Ref Range    Specimen source NASAL      COVID-19 rapid test Detected (AA) NOTD         All Micro Results     Procedure Component Value Units Date/Time    COVID-19 RAPID TEST [[id number]]  (Abnormal) Collected: 12/12/21 1346    Order Status: Completed Specimen: Nasopharyngeal Updated: 12/12/21 1411     Specimen source NASAL        Comment: RAPID ONLY        COVID-19 rapid test Detected        Comment: RESULTS VERIFIED, PHONED TO AND READ BACK BY  Jordyn Castillo RN ON 12/12/21 @1410, TA       The specimen is POSITIVE for SARS-CoV-2, the novel coronavirus associated with COVID-19. This test has been authorized by the FDA under an Emergency Use Authorization (EUA) for use by authorized laboratories.         Fact sheet for Healthcare Providers: ConventionUpdate.co.nz  Fact sheet for Patients: ConventionUpdate.co.nz       Methodology: Isothermal Nucleic Acid Amplification         BLOOD CULTURE [[id number]] Collected: 12/11/21 2256    Order Status: Completed Specimen: Blood Updated: 12/12/21 0308    BLOOD CULTURE [[id number]]     Order Status: Canceled Specimen: Blood           Other Studies:  XR CHEST PORT    Result Date: 12/11/2021  EXAM: XR CHEST PORT HISTORY: meets SIRS criteria. TECHNIQUE: Frontal chest. COMPARISON: Multiple prior studies with most recent on FINDINGS: The cardiac silhouette, mediastinum, and pulmonary vasculature are within normal limits. Bilateral lung infiltrates are observed. There is no pleural effusion, or pneumothorax. No significant osseous abnormalities are observed. Bilateral lung infiltrates are appreciated as described above. This pattern is compatible with atypical pneumonia, including viral pneumonia. Medications Administered     acetaminophen (TYLENOL) tablet 1,000 mg     Admin Date  12/11/2021 Action  Given Dose  1,000 mg Route  Oral Administered By  Chase Don RN          dexamethasone (DECADRON) 10 mg/mL injection 6 mg     Admin Date  12/12/2021 Action  Given Dose  6 mg Route  IntraVENous Administered By  Mary Grace Deal RN          lactated ringers bolus infusion 1,000 mL     Admin Date  12/11/2021 Action  New Bag Dose  1,000 mL Rate  1,000 mL/hr Route  IntraVENous Administered By  Chayo Vu RN          sodium chloride (NS) flush 5-10 mL     Admin Date  12/11/2021 Action  Given Dose  10 mL Route  IntraVENous Administered By  Chayo Vu RN                Signed:  Andrew Gonzalez MD    Part of this note may have been written by using a voice dictation software. The note has been proof read but may still contain some grammatical/other typographical errors.

## 2021-12-12 NOTE — ASSESSMENT & PLAN NOTE
Decadron. Baricitinib. Oxygen support as needed. Will increase as needed and wean as able. Will monitor closely and adjust treatment as necessary.

## 2021-12-12 NOTE — PROGRESS NOTES
Patient received from emergency breathing with ease on 02 via nasal canula at 1L no acute distress noted. Patient weaned off 02 saturating 98% on 1L. Patient admitted due to having SOB, cough and fever Covid+. Iv canula intact no redness or swelling noted at site. Patient denies any pain or n/v. Patient's skin assessed with BHARATHI Eldridge patient's skin noted to be intact. Bed locked on lowest position and call bell within reach.

## 2021-12-12 NOTE — ED PROVIDER NOTES
70-year-old lady presents with concerns about shortness of breath, fevers, fatigue, and diarrhea. She has been Covid positive for a little over a week. She says she started to get sick last Saturday. She notes that with her vomiting and diarrhea she has felt very dehydrated. The urgent care she has received a total of 4 L of fluid over the past several days. She says she feels like she still is not fully hydrated. She says she has not on any immunosuppressants. She said her emesis and her diarrhea have been nonbloody. She has had a mild cough and diffuse body aches. No other associated symptoms    Elements of this note were created using speech recognition software. As such, errors of speech recognition may be present.            Past Medical History:   Diagnosis Date    Arthritis     Fibromyalgia     on med for control    Fibromyalgia     Fibromyalgia 2020    GERD (gastroesophageal reflux disease)     diet controlled    History of IBS     Hypertension     controlled with med    Liver disease 2011    elevated LFTs    Renal stone     Severe obesity ([address] 11/21/2018       Past Surgical History:   Procedure Laterality Date    HX CHOLECYSTECTOMY  12/06/2018    HX COLONOSCOPY  02/2018    and egd    HX HEENT  7yrs old    plastic surgery on lip    HX HYSTERECTOMY      HX ROTATOR CUFF REPAIR Right 2018    HX TOTAL LAPAROSCOPIC HYSTERECTOMY W/ BS&O  12/10/14    VICTORINO         Family History:   Problem Relation Age of Onset    Cancer Father         lung    Hypertension Brother     Emphysema Mother     Cancer Mother     Cancer Sister         brain tumor    Cancer Sister         bladder cancer x 2    Stroke Sister     Liver Disease Sister     Heart Disease Brother     Cancer Brother         bladder cancer    Heart Attack Brother     Thyroid Disease Brother        Social History     Socioeconomic History    Marital status: SINGLE     Spouse name: Car Finks Number of children: Not on file  Years of education: Not on file    Highest education level: Not on file   Occupational History    Occupation: Makes carbon brushes     Employer: [employer]   Tobacco Use    Smoking status: Never Smoker    Smokeless tobacco: Never Used   Substance and Sexual Activity    Alcohol use: No    Drug use: No    Sexual activity: Not Currently     Partners: Female     Birth control/protection: None     Comment: Partner is Gillian   Other Topics Concern    Not on file   Social History Narrative    Nadine Gomez!!     Social Determinants of Health     Financial Resource Strain:     Difficulty of Paying Living Expenses: Not on file   Food Insecurity:     Worried About Running Out of Food in the Last Year: Not on file    Jonah of Food in the Last Year: Not on file   Transportation Needs:     Lack of Transportation (Medical): Not on file    Lack of Transportation (Non-Medical):  Not on file   Physical Activity:     Days of Exercise per Week: Not on file    Minutes of Exercise per Session: Not on file   Stress:     Feeling of Stress : Not on file   Social Connections:     Frequency of Communication with Friends and Family: Not on file    Frequency of Social Gatherings with Friends and Family: Not on file    Attends Religion Services: Not on file    Active Member of [address] [place] [address] or Organizations: Not on file    Attends Club or Organization Meetings: Not on file    Marital Status: Not on file   Intimate Partner Violence:     Fear of Current or Ex-Partner: Not on file    Emotionally Abused: Not on file    Physically Abused: Not on file    Sexually Abused: Not on file   Housing Stability:     Unable to Pay for Housing in the Last Year: Not on file    Number of [place] in the Last Year: Not on file    Unstable Housing in the Last Year: Not on file         ALLERGIES: Chlorhexidine, Codeine, Hibiclens [chlorhexidine gluconate], and Prednisone    Review of Systems   Constitutional: Positive for activity change, chills, fatigue and fever. HENT: Negative for congestion, rhinorrhea and sore throat. Eyes: Negative for discharge and redness. Respiratory: Positive for cough and shortness of breath. Negative for wheezing. Cardiovascular: Negative for chest pain and palpitations. Gastrointestinal: Negative for diarrhea, nausea and vomiting. Genitourinary: Negative for dysuria and frequency. Musculoskeletal: Positive for myalgias. Negative for gait problem and joint swelling. Skin: Negative for color change and wound. Allergic/Immunologic: Negative for environmental allergies and food allergies. Neurological: Positive for headaches. Negative for seizures and speech difficulty. Hematological: Negative for adenopathy. Psychiatric/Behavioral: Negative for confusion and dysphoric mood. Vitals:    12/12/21 0005 12/12/21 0006 12/12/21 0007 12/12/21 0009   BP:       Pulse:       Resp:       Temp:       SpO2: 94% 95% 95% 95%   Weight:       Height:                Physical Exam  Vitals and nursing note reviewed. Constitutional:       Appearance: Normal appearance. HENT:      Head: Normocephalic and atraumatic. Mouth/Throat:      Mouth: Mucous membranes are moist.   Cardiovascular:      Rate and Rhythm: Regular rhythm. Heart sounds: Normal heart sounds. Comments: Tachycardic  Pulmonary:      Comments: Diffuse crackles  Abdominal:      General: Bowel sounds are normal.      Palpations: Abdomen is soft. Musculoskeletal:      Right lower leg: No edema. Left lower leg: No edema. Neurological:      General: No focal deficit present. Mental Status: She is alert and oriented to person, place, and time. MDM  Number of Diagnoses or Management Options  Diagnosis management comments: Patient's O2 sats have around 93% while sitting at rest on the stretcher. With even minimal exertion she drops down into the 80s.   I think she would benefit from admission and consideration of steroids and further treatment.          Procedures

## 2021-12-12 NOTE — PROGRESS NOTES
Bedside report received from night nurse Reanna. Assessment done as noted  Respiration even and unlabored 20/min; denies pain or nausea at present. Afebrile this morning. Reports productive coughing with clear phlegm at interval. Now weaned off of O2 with RA sats 93% at rest. Remains on Droplet plus isolation for positive COVID-19 screening. Ordered PPE in place and in use. Encouraged to call with needs.

## 2021-12-12 NOTE — ED NOTES
TRANSFER - OUT REPORT:    Verbal report given to RN Reanna on Stacia Hutchinson  being transferred to 8th Floor for routine progression of care       Report consisted of patients Situation, Background, Assessment and   Recommendations(SBAR). Information from the following report(s) SBAR, ED Summary, Procedure Summary, MAR and Recent Results was reviewed with the receiving nurse. Lines:   Peripheral IV 12/11/21 Right Hand (Active)   Site Assessment Clean, dry, & intact 12/11/21 2253   Phlebitis Assessment 0 12/11/21 2253   Infiltration Assessment 0 12/11/21 2253   Dressing Status Clean, dry, & intact 12/11/21 2253   Dressing Type Transparent 12/11/21 2253   Hub Color/Line Status Green 12/11/21 2253        Opportunity for questions and clarification was provided.       Patient transported with:   [employer]

## 2021-12-12 NOTE — PROGRESS NOTES
Small amount of formed loose X1. Does not meet criteria to collect for c-diff. Pt reports \"diarrhea has been more watery than that\". Made aware that will obtain specimen with next stool and sent to lab if it meets c-diff criteria. Verbalizes understanding. Made aware that will need to swab for rapid COVID test. Reports positive test on Monday at  outside facility. Pt asked and family to bring copy of results to avoid from getting re-swabbed again. Will follow.

## 2021-12-12 NOTE — ED NOTES
Ambulated pt 10 feet to wheelchair and back with sat probe monitoring. O2 noted to desaturate from 95% down to 89%.

## 2021-12-12 NOTE — H&P
History and Physical          Hospitalist History and Physical   Admit Date:  2021 10:33 PM   Name:  Gurdeep Coles   Age:  64 y.o. Sex:  female  :  1965   MRN:  [id number]   Room:  ER14/14    Presenting Complaint: Positive For Covid-19    Reason(s) for Admission: COVID pneumonia with hypoxia     History of Present Illness:   Gurdeep Coles is a 64 y.o. female with medical history of hypertension controlled with a single agent, fibromyalgia, psoriatic arthritis who was on sulfasalazine for a while but has not been on it in several years and so is not immunosuppressed, who is unvaccinated for Covid, and who presented with complaint of 1 week of cough, congestion, progressing to shortness of breath, weakness, fatigue, malaise, fever to 103, with onset of symptoms on 2021, seen in emergency MD for dehydration given 2 L of fluid and received a send off Covid test on Wednesday, 2021 followed by return visit on Thursday, 2021 for 2 more bags of IV fluids per her report. She found out on Friday that her Covid was positive. She comes here tonight with worsening shortness of breath and is noted to be hypoxic with any level of exertion requiring admission and oxygen support. She denies chest pain, productive cough although she does report a cough, nausea with the exception of one episode of nausea and vomiting when she arrived here tonight, vomiting with exception to that one episode, abdominal pain, constipation, focal numbness or weakness. She does report several episodes of diarrhea on the previous 2 days but not in the last 24 hours. Review of Systems:  10 systems reviewed and negative except as noted in HPI. Assessment & Plan:     Principal Problem:  Pneumonia due to COVID-19 virus  Decadron. Baricitinib. Oxygen support as needed. Will increase as needed and wean as able. Will monitor closely and adjust treatment as necessary. Primary hypertension  Continue atenolol. Will monitor and adjust treatment as necessary. Psoriatic arthritis ([address]  Continue Voltaren gel and tramadol as prescribed at home. Will monitor and adjust treatment as necessary. Patient also reports she takes Cymbalta. Fibromyalgia  Continue tramadol and Cymbalta.       Dispo/Discharge Plannin-7 midnights    Diet: Low fat diet  VTE ppx: Lovenox  Code status: [address] Problems as of 2021 Date Reviewed: 2021          Codes Class Noted - Resolved POA    * (Principal) Pneumonia due to COVID-19 virus ICD-10-CM: U07.1, J12.82  ICD-9-CM: 480.8, 079.89  2021 - Present Yes        Primary hypertension ICD-10-CM: I10  ICD-9-CM: 401.9  2013 - Present Yes        Psoriatic arthritis ([facility] [address] ICD-10-CM: L40.50  ICD-9-CM: 696.0  2021 - Present Yes        Fibromyalgia ICD-10-CM: M79.7  ICD-9-CM: 729.1  2013 - Present Yes              Past History:  Past Medical History:   Diagnosis Date    Arthritis     Fibromyalgia     on med for control    Fibromyalgia     Fibromyalgia     GERD (gastroesophageal reflux disease)     diet controlled    History of IBS     Hypertension     controlled with med    Liver disease     elevated LFTs    Renal stone     Severe obesity ([facility] [address] 2018     Past Surgical History:   Procedure Laterality Date    HX CHOLECYSTECTOMY  2018    HX COLONOSCOPY  2018    and egd    HX HEENT  10yrs old    plastic surgery on lip    HX HYSTERECTOMY      HX ROTATOR CUFF REPAIR Right 2018    HX TOTAL LAPAROSCOPIC HYSTERECTOMY W/ BS&O  12/10/14    VICTORINO      Allergies   Allergen Reactions    Chlorhexidine Rash    Codeine Other (comments)     \"flushed\"   \"heart palpitations\"    Hibiclens [Chlorhexidine Gluconate] Rash    Prednisone Palpitations      Social History     Tobacco Use    Smoking status: Never Smoker    Smokeless tobacco: Never Used   Substance Use Topics    Alcohol use: No      Family History   Problem Relation Age of Onset    Cancer Father         lung    Hypertension Brother     Emphysema Mother     Cancer Mother     Cancer Sister         brain tumor    Cancer Sister         bladder cancer x 2    Stroke Sister     Liver Disease Sister     Heart Disease Brother     Cancer Brother         bladder cancer    Heart Attack Brother     Thyroid Disease Brother       Family history reviewed and negative except as otherwise noted. There is no immunization history on file for this patient.   Prior to Admit Medications:  Current Outpatient Medications   Medication Instructions    atenolol (TENORMIN) 50 mg tablet Oral, DAILY    diclofenac (VOLTAREN) 1-2 g, Topical, 3 TIMES DAILY    pramipexole (MIRAPEX) 0.5 mg, Oral, EVERY BEDTIME    sertraline (ZOLOFT) 50 mg tablet Oral, DAILY    sulfaSALAzine (AZULFIDINE) 500 mg, Oral, 4 TIMES DAILY    traMADoL (ULTRAM) 50 mg, EVERY 6 HOURS AS NEEDED       Objective:     Patient Vitals for the past 24 hrs:   Temp Pulse Resp BP SpO2   12/12/21 0350 98.2 °F (36.8 °C)       12/12/21 0242     98 %   12/12/21 0009     95 %   12/12/21 0007     95 %   12/12/21 0006     95 %   12/12/21 0005     94 %   12/12/21 0004     95 %   12/12/21 0003     94 %   12/12/21 0002     94 %   12/12/21 0001     95 %   12/12/21 0000     94 %   12/11/21 2359     95 %   12/11/21 2358     95 %   12/11/21 2357     94 %   12/11/21 2356     93 %   12/11/21 2355     94 %   12/11/21 2354     94 %   12/11/21 2353     94 %   12/11/21 2352     94 %   12/11/21 2351     94 %   12/11/21 2345 100.2 °F (37.9 °C) (!) 106 22  93 %   12/11/21 2240 (!) 103.1 °F (39.5 °C) (!) 124 22 (!) 156/85 94 %     Oxygen Therapy  O2 Sat (%): 98 % (12/12/21 0242)  Pulse via Oximetry: 77 beats per minute (12/12/21 0242)  O2 Device: Nasal cannula (12/12/21 0242)  O2 Flow Rate (L/min): 1 l/min (12/12/21 0242)    Estimated body mass index is 37.44 kg/m² as calculated from the following:    Height as of this encounter: 5' 5\" (1.651 m). Weight as of this encounter: 102.1 kg (225 lb). No intake or output data in the 24 hours ending 12/12/21 0420      Physical Exam:    Blood pressure (!) 156/85, pulse (!) 106, temperature 98.2 °F (36.8 °C), resp. rate 22, height 5' 5\" (1.651 m), weight 102.1 kg (225 lb), last menstrual period 11/02/2014, SpO2 98 %. General:    Well nourished. Lying on stretcher in no acute distress in semireclined position. Nontoxic-appearing. Ill-appearing. Using nasal cannula with O2 at 1 L/min. Head:  Normocephalic, atraumatic  Eyes:  Sclerae appear normal.  Pupils equally round. ENT:  Nares appear normal, no drainage. Moist oral mucosa  Neck:  No restricted ROM. Trachea midline   CV:   RRR. No m/r/g. No jugular venous distension. Lungs:   Bibasilar rales and rhonchi. Good air movement throughout. Tod Hilt Respirations even, unlabored. No current use of accessory muscles of respiration noted. Abdomen: Bowel sounds present. Soft, nontender, nondistended. Extremities: No cyanosis or clubbing. No edema  Skin:     No rashes and normal coloration. Warm and dry. Neuro:  CN II-XII grossly intact. Sensation intact. A&Ox3  Psych:  Normal mood and affect.       I have reviewed ordered lab tests and independently visualized imaging below:    Last 24hr Labs:  Recent Results (from the past 24 hour(s))   LACTIC ACID    Collection Time: 12/11/21 10:56 PM   Result Value Ref Range    Lactic acid 1.2 0.4 - 2.0 MMOL/L   CBC WITH AUTOMATED DIFF    Collection Time: 12/11/21 10:56 PM   Result Value Ref Range    WBC 3.5 (L) 4.3 - 11.1 K/uL    RBC 4.95 4.05 - 5.2 M/uL    HGB 14.2 11.7 - 15.4 g/dL    HCT 42.4 35.8 - 46.3 %    MCV 85.7 79.6 - 97.8 FL    MCH 28.7 26.1 - 32.9 PG    MCHC 33.5 31.4 - 35.0 g/dL    RDW 12.4 11.9 - 14.6 %    PLATELET 464 (L) 876 - 450 K/uL    MPV 10.1 9.4 - 12.3 FL    ABSOLUTE NRBC 0.00 0.0 - 0.2 K/uL    DF AUTOMATED      NEUTROPHILS 75 43 - 78 %    LYMPHOCYTES 19 13 - 44 %    MONOCYTES 6 4.0 - 12.0 %    EOSINOPHILS 0 (L) 0.5 - 7.8 %    BASOPHILS 0 0.0 - 2.0 %    IMMATURE GRANULOCYTES 0 0.0 - 5.0 %    ABS. NEUTROPHILS 2.6 1.7 - 8.2 K/UL    ABS. LYMPHOCYTES 0.6 0.5 - 4.6 K/UL    ABS. MONOCYTES 0.2 0.1 - 1.3 K/UL    ABS. EOSINOPHILS 0.0 0.0 - 0.8 K/UL    ABS. BASOPHILS 0.0 0.0 - 0.2 K/UL    ABS. IMM. GRANS. 0.0 0.0 - 0.5 K/UL   METABOLIC PANEL, COMPREHENSIVE    Collection Time: 12/11/21 10:56 PM   Result Value Ref Range    Sodium 138 136 - 145 mmol/L    Potassium 3.3 (L) 3.5 - 5.1 mmol/L    Chloride 103 98 - 107 mmol/L    CO2 25 21 - 32 mmol/L    Anion gap 10 7 - 16 mmol/L    Glucose 117 (H) 65 - 100 mg/dL    BUN 6 6 - 23 MG/DL    Creatinine 0.71 0.6 - 1.0 MG/DL    GFR est AA >60 >60 ml/min/1.73m2    GFR est non-AA >60 >60 ml/min/1.73m2    Calcium 8.6 8.3 - 10.4 MG/DL    Bilirubin, total 0.3 0.2 - 1.1 MG/DL    ALT (SGPT) 60 12 - 65 U/L    AST (SGOT) 44 (H) 15 - 37 U/L    Alk. phosphatase 64 50 - 136 U/L    Protein, total 7.1 6.3 - 8.2 g/dL    Albumin 3.1 (L) 3.5 - 5.0 g/dL    Globulin 4.0 (H) 2.3 - 3.5 g/dL    A-G Ratio 0.8 (L) 1.2 - 3.5     PROCALCITONIN    Collection Time: 12/11/21 10:56 PM   Result Value Ref Range    Procalcitonin <0.05 0.00 - 0.49 ng/mL   C REACTIVE PROTEIN, QT    Collection Time: 12/11/21 10:56 PM   Result Value Ref Range    C-Reactive protein 7.9 (H) 0.0 - 0.9 mg/dL       All Micro Results     Procedure Component Value Units Date/Time    BLOOD CULTURE [[id number]] Collected: 12/11/21 2256    Order Status: Completed Specimen: Blood Updated: 12/12/21 0308    BLOOD CULTURE [[id number]]     Order Status: Canceled Specimen: Blood           Other Studies:  XR CHEST PORT    Result Date: 12/11/2021  EXAM: XR CHEST PORT HISTORY: meets SIRS criteria. TECHNIQUE: Frontal chest. COMPARISON: Multiple prior studies with most recent on FINDINGS: The cardiac silhouette, mediastinum, and pulmonary vasculature are within normal limits.  Bilateral lung infiltrates are observed. There is no pleural effusion, or pneumothorax. No significant osseous abnormalities are observed. Bilateral lung infiltrates are appreciated as described above. This pattern is compatible with atypical pneumonia, including viral pneumonia. Medications Administered     acetaminophen (TYLENOL) tablet 1,000 mg     Admin Date  12/11/2021 Action  Given Dose  1,000 mg Route  Oral Administered By  Lit Radford RN          dexamethasone (DECADRON) 10 mg/mL injection 6 mg     Admin Date  12/12/2021 Action  Given Dose  6 mg Route  IntraVENous Administered By  Mary Ryan RN          lactated ringers bolus infusion 1,000 mL     Admin Date  12/11/2021 Action  New Bag Dose  1,000 mL Rate  1,000 mL/hr Route  IntraVENous Administered By  Verónica Rico RN          sodium chloride (NS) flush 5-10 mL     Admin Date  12/11/2021 Action  Given Dose  10 mL Route  IntraVENous Administered By  Verónica Rico RN                Signed:  Timmy Rahman MD    Part of this note may have been written by using a voice dictation software. The note has been proof read but may still contain some grammatical/other typographical errors.

## 2021-12-12 NOTE — PROGRESS NOTES
Pt reports of \"having diarrhea\" which started this morning. States it is \"loose\". Will monitor and collect stool to r/o c-diff it criteria is met to send specimen. Spoke with Dr. Kristina Jeter and made aware. No new orders at present.

## 2021-12-12 NOTE — PROGRESS NOTES
Micro reported pt with positive rapid covid test. Read back and confirmed Temp now 100.4. /79. Vomiting. Last dose of Zofran 4mg IV given at at [facility] and unable to give next dose at present as ordered. Unable to take po at [facility] due to n/v. Perfect serve message to Dr. Nicko Thomas to notify. Awaiting call back. Ty 163 with Dr Nicko Thomas. New orders received regarding above. PO potassium order DC'd per order. Will start IVF as ordered. 1702 perfect serve message to Dr. Nicko Thomas to clarify orders for IVF. Orders noted for NS at 50cc/hr and NS with 20 KCL at 50cc/hr. Awaiting orders. 1710 spoke with Dr. Nicko Thomas. New orders to continue NS with 20 meq of potassium as ordered. NS order dc'd per order      9654 new orders noted to give potassium chloride 10 mEq in 50 ml IVPB X2, active orders for 0.9% sodium chloride with KCl 20 mEq/L infusion 50cc/hr. Perfect serve message to Dr. Nicko Thomas to clarify which orders to continue. 1741 spoke with Dr. Nicko Thomas and states to give \"both IVPB potassium 10meq X2 and continuous NS with 20meq KCL at 50cc/hr\". 1800 awaiting IVPB of potassium from pharmacy.

## 2021-12-12 NOTE — ED TRIAGE NOTES
Arrives with face mask in place. Arrives from home via [facility] with reports COVID positive. Reports tested Wednesday, received results yesterday. Vomiting on arrival however denies up until this point. Reports small amount of diarrhea, decreased appetite, fever/chills, productive cough with clear sputum, alteration in taste. Denies abdominal pain, chest pain. Reports seen at emergency MD Wednesday, received 2 bags IVFs, seen there Thursday and received 1 bag IVFs and yesterday with 1 bag IVFs given. Unvaccinated. Mottled on arrival. Last attempted tylenol approx 6-7 hours pta.

## 2021-12-12 NOTE — PROGRESS NOTES
Unable to provide a copy of recent results of positive COVID. Rapid COVID swab collected and sent to lab. To follow results and monitor.

## 2021-12-13 LAB
ANION GAP SERPL CALC-SCNC: 7 MMOL/L (ref 7–16)
BASOPHILS # BLD: 0 K/UL (ref 0–0.2)
BASOPHILS NFR BLD: 0 % (ref 0–2)
BILIRUB UR QL: ABNORMAL
BUN SERPL-MCNC: 11 MG/DL (ref 6–23)
CALCIUM SERPL-MCNC: 8.7 MG/DL (ref 8.3–10.4)
CHLORIDE SERPL-SCNC: 105 MMOL/L (ref 98–107)
CO2 SERPL-SCNC: 26 MMOL/L (ref 21–32)
CREAT SERPL-MCNC: 0.68 MG/DL (ref 0.6–1)
CRP SERPL-MCNC: 6.2 MG/DL (ref 0–0.9)
D DIMER PPP FEU-MCNC: 0.9 UG/ML(FEU)
DIFFERENTIAL METHOD BLD: ABNORMAL
EOSINOPHIL # BLD: 0 K/UL (ref 0–0.8)
EOSINOPHIL NFR BLD: 0 % (ref 0.5–7.8)
ERYTHROCYTE [DISTWIDTH] IN BLOOD BY AUTOMATED COUNT: 12.4 % (ref 11.9–14.6)
GLUCOSE SERPL-MCNC: 128 MG/DL (ref 65–100)
GLUCOSE UR QL STRIP.AUTO: NEGATIVE MG/DL
HCT VFR BLD AUTO: 38.5 % (ref 35.8–46.3)
HGB BLD-MCNC: 12.7 G/DL (ref 11.7–15.4)
IMM GRANULOCYTES # BLD AUTO: 0 K/UL (ref 0–0.5)
IMM GRANULOCYTES NFR BLD AUTO: 0 % (ref 0–5)
KETONES UR-MCNC: 80 MG/DL
LEUKOCYTE ESTERASE UR QL STRIP: NEGATIVE
LYMPHOCYTES # BLD: 0.5 K/UL (ref 0.5–4.6)
LYMPHOCYTES NFR BLD: 9 % (ref 13–44)
MAGNESIUM SERPL-MCNC: 2.1 MG/DL (ref 1.8–2.4)
MCH RBC QN AUTO: 28 PG (ref 26.1–32.9)
MCHC RBC AUTO-ENTMCNC: 33 G/DL (ref 31.4–35)
MCV RBC AUTO: 85 FL (ref 79.6–97.8)
MONOCYTES # BLD: 0.3 K/UL (ref 0.1–1.3)
MONOCYTES NFR BLD: 5 % (ref 4–12)
NEUTS SEG # BLD: 4.9 K/UL (ref 1.7–8.2)
NEUTS SEG NFR BLD: 86 % (ref 43–78)
NITRITE UR QL: NEGATIVE
NRBC # BLD: 0 K/UL (ref 0–0.2)
PH UR: 6 [PH] (ref 5–9)
PHOSPHATE SERPL-MCNC: 3.5 MG/DL (ref 2.5–4.5)
PLATELET # BLD AUTO: 168 K/UL (ref 150–450)
PMV BLD AUTO: 10.2 FL (ref 9.4–12.3)
POTASSIUM SERPL-SCNC: 3.8 MMOL/L (ref 3.5–5.1)
PROT UR QL: 100 MG/DL
RBC # BLD AUTO: 4.53 M/UL (ref 4.05–5.2)
RBC # UR STRIP: NEGATIVE /UL
SODIUM SERPL-SCNC: 138 MMOL/L (ref 136–145)
SP GR UR: >1.03 (ref 1–1.02)
UROBILINOGEN UR QL: 1 EU/DL (ref 0.2–1)
WBC # BLD AUTO: 5.7 K/UL (ref 4.3–11.1)

## 2021-12-13 PROCEDURE — 80048 BASIC METABOLIC PNL TOTAL CA: CPT

## 2021-12-13 PROCEDURE — 84100 ASSAY OF PHOSPHORUS: CPT

## 2021-12-13 PROCEDURE — 86140 C-REACTIVE PROTEIN: CPT

## 2021-12-13 PROCEDURE — 97535 SELF CARE MNGMENT TRAINING: CPT

## 2021-12-13 PROCEDURE — 74011250637 HC RX REV CODE- 250/637: Performed by: EMERGENCY MEDICINE

## 2021-12-13 PROCEDURE — 97165 OT EVAL LOW COMPLEX 30 MIN: CPT

## 2021-12-13 PROCEDURE — 74011250636 HC RX REV CODE- 250/636: Performed by: STUDENT IN AN ORGANIZED HEALTH CARE EDUCATION/TRAINING PROGRAM

## 2021-12-13 PROCEDURE — 65270000029 HC RM PRIVATE

## 2021-12-13 PROCEDURE — 36415 COLL VENOUS BLD VENIPUNCTURE: CPT

## 2021-12-13 PROCEDURE — 85379 FIBRIN DEGRADATION QUANT: CPT

## 2021-12-13 PROCEDURE — 85025 COMPLETE CBC W/AUTO DIFF WBC: CPT

## 2021-12-13 PROCEDURE — 83735 ASSAY OF MAGNESIUM: CPT

## 2021-12-13 PROCEDURE — 97116 GAIT TRAINING THERAPY: CPT

## 2021-12-13 RX ORDER — DULOXETIN HYDROCHLORIDE 30 MG/1
30 CAPSULE, DELAYED RELEASE ORAL DAILY
Status: DISCONTINUED | OUTPATIENT
Start: 2021-12-14 | End: 2021-12-27 | Stop reason: HOSPADM

## 2021-12-13 RX ORDER — DEXAMETHASONE 4 MG/1
6 TABLET ORAL DAILY
Status: DISCONTINUED | OUTPATIENT
Start: 2021-12-13 | End: 2021-12-17

## 2021-12-13 RX ADMIN — ENOXAPARIN SODIUM 30 MG: 100 INJECTION SUBCUTANEOUS at 21:23

## 2021-12-13 RX ADMIN — Medication 10 ML: at 05:16

## 2021-12-13 RX ADMIN — Medication 10 ML: at 13:38

## 2021-12-13 RX ADMIN — Medication 10 ML: at 21:23

## 2021-12-13 RX ADMIN — ENOXAPARIN SODIUM 30 MG: 100 INJECTION SUBCUTANEOUS at 08:57

## 2021-12-13 RX ADMIN — DEXAMETHASONE 6 MG: 4 TABLET ORAL at 11:25

## 2021-12-13 RX ADMIN — BARICITINIB 4 MG: 2 TABLET, FILM COATED ORAL at 08:57

## 2021-12-13 RX ADMIN — ATENOLOL 50 MG: 50 TABLET ORAL at 08:56

## 2021-12-13 NOTE — PROGRESS NOTES
Pt resting in bed. Pt very weak when getting up to use BSC. Pt on 2 L NC and reports no BM for this shift. Call light in reach, will monitor.

## 2021-12-13 NOTE — PROGRESS NOTES
ACUTE OT GOALS:  (Developed with and agreed upon by patient and/or caregiver.)  1. Patient will complete lower body bathing and dressing with Mod I and adaptive equipment as needed. 2. Patient will complete toileting with Mod I and adaptive equipment as needed. 3. Patient will tolerate 23 minutes of OT treatment with 1-2 rest breaks to increase activity tolerance for ADLs. 4. Patient will complete functional transfers with Mod I and adaptive equipment as needed. 5. Patient will complete standing grooming ADL with Mod I and seated rest breaks as needed. 6. Patient will verbalize and demonstrate at least 3 energy conservation techniques to increase activity tolerance for ADLs. Timeframe: 7 visits       OCCUPATIONAL THERAPY ASSESSMENT: Initial Assessment, Daily Note and AM OT Treatment Day # 1    Mary Franklin is a 64 y.o. female   PRIMARY DIAGNOSIS: Pneumonia due to COVID-19 virus  Pneumonia due to COVID-19 virus [U07.1, J12.82]       Reason for Referral:  Generalized Weakness  ICD-10: Treatment Diagnosis: Generalized Muscle Weakness (M62.81)  Difficulty in walking, Not elsewhere classified (R26.2)  INPATIENT: Payor: BLUE CROSS / Plan: [place] BLUE CROSS [facility] / Product Type: PPO /   ASSESSMENT:     REHAB RECOMMENDATIONS:   Recommendation to date pending progress:  [address] [place] [address] Therapy  Equipment:    To Be Determined. Pt has SC. PRIOR LEVEL OF FUNCTION:  (Prior to Hospitalization)  INITIAL/CURRENT LEVEL OF FUNCTION:  (Based on today's evaluation)   Bathing:   Independent  Dressing:   Independent  Feeding/Grooming:   Independent  Toileting:   Independent  Functional Mobility:   Independent Bathing:   Minimal Assistance  Dressing:   Minimal Assistance  Feeding/Groomin [address] standing at sinkside. Toileting:   Minimal Assistance  Functional Mobility:   CGA to Min x 2     ASSESSMENT:  Ms. Chinmay Zamorano was admitted for pneumonia due to Covid 19+.  Presents with deficits in overall strength, balance, and activity tolerance for performance of ADLs and functional mobility. Requires SBA and additional time to complete supine <> sit. Requires Min A to complete initial sit <> stand and CGA to Min A x 2 to walk from bed <> sink. Requires CGA to brush teeth at sink. Requires CGA to Min A x 2 to walk from sink <> commode and CGA to complete clothing management and complete stand <> sit on commode. Requires SBA to complete wiping in seated. Requires CGA to complete sit <> stand and CGA to complete clothing management. Requires CGA x 2 to walk from commode <> sink and CGA to wash hands. Requires CGA x 2 to complete additional in room mobility and Min A x 2 to complete due to increased fatigue. Requires CGA to complete stand <> sit in chair. Pt on 2L O2 at start of session with O2 sats > than 90% at rest. Pt does desaturate to 86% with functional mobility. Requires increase to 3L O2 to improve O2 sats to > than 90%. Weaned back down to 2L O2 with O2 sats at 93% at rest. Pt would benefit from continued skilled OT to increase independence and safety for performance of ADLs and functional mobility. SUBJECTIVE:   Ms. Ornelas Minium states, \"I'm standing 10 hours per day. \" In reference to activity during work day. SOCIAL HISTORY/LIVING ENVIRONMENT: Lives with two roommates who work during the day in one story home with a couple of JUAN. Independent for ADLs and functional mobility. Working in manual labor position. Pt niece has brought a SC to her home.    Support Systems: Friend/Neighbor    OBJECTIVE:     PAIN: VITAL SIGNS: LINES/DRAINS:   Pre Treatment: Pain Screen  Pain Scale 1: Numeric (0 - 10)  Pain Intensity 1: 0  Post Treatment: Unchanged Vital Signs  O2 Sat (%): 93 %  O2 Device: Nasal cannula  O2 Flow Rate (L/min): 2 l/min None  O2 Device: Nasal cannula     GROSS EVALUATION:  BUE Within Functional Limits Abnormal/ Functional Abnormal/ Non-Functional (see comments) Not Tested Comments:   AROM [x] [] [] []    PROM [] [] [] [x]    Strength [] [x] [] [] BUE   Balance [] [x] [] []    Posture [] [x] [] []    Sensation [x] [] [] []    Coordination [x] [] [] []    Tone [] [] [] [x]    Edema [x] [] [] []    Activity Tolerance [] [x] [] []     [] [] [] []      COGNITION/  PERCEPTION: Intact Impaired   (see comments) Comments:   Orientation [x] []    Vision [x] []    Hearing [x] []    Judgment/ Insight [x] []    Attention [x] []    Memory [x] []    Command Following [x] []    Emotional Regulation [x] []     [] []      ACTIVITIES OF DAILY LIVING: I Mod I S SBA CGA Min Mod Max Total NT Comments   BASIC ADLs:              Bathing/ Showering [] [] [] [] [] [] [] [] [] [x]    Toileting [] [] [] [] [x] [] [] [] [] [] Clothing management in preparation for/following toileting. Dressing [] [] [] [] [x] [] [] [] [] []    Feeding [] [] [] [] [] [] [] [] [] [x]    Grooming [] [] [] [] [x] [] [] [] [] [] CGA to brush teeth and wash hands at sink.     Personal Device Care [] [] [] [] [] [] [] [] [] [x]    Functional Mobility [] [] [] [] [x] [x] [] [] [] [] CGA to Min A x 2   I=Independent, Mod I=Modified Independent, S=Supervision, SBA=Standby Assistance, CGA=Contact Guard Assistance,   Min=Minimal Assistance, Mod=Moderate Assistance, Max=Maximal Assistance, Total=Total Assistance, NT=Not Tested    MOBILITY: I Mod I S SBA CGA Min Mod Max Total  NT x2 Comments:   Supine to sit [] [] [] [x] [] [] [] [] [] [] []    Sit to supine [] [] [] [] [] [] [] [] [] [x] []    Sit to stand [] [] [] [] [x] [x] [] [] [] [] []    Bed to chair [] [] [] [] [x] [x] [] [] [] [] [x] X HHA   I=Independent, Mod I=Modified Independent, S=Supervision, SBA=Standby Assistance, CGA=Contact Guard Assistance,   Min=Minimal Assistance, Mod=Moderate Assistance, Max=Maximal Assistance, Total=Total Assistance, NT=Not [facility] 6 Clicks   Daily Activity Inpatient Short Form        How much help from another person does the patient currently need. .. Total A Lot A Little None   1. Putting on and taking off regular lower body clothing? [] 1   [] 2   [x] 3   [] 4   2. Bathing (including washing, rinsing, drying)? [] 1   [] 2   [x] 3   [] 4   3. Toileting, which includes using toilet, bedpan or urinal?   [] 1   [] 2   [x] 3   [] 4   4. Putting on and taking off regular upper body clothing? [] 1   [] 2   [x] 3   [] 4   5. Taking care of personal grooming such as brushing teeth? [] 1   [] 2   [x] 3   [] 4   6. Eating meals? [] 1   [] 2   [] 3   [x] 4   © 2007, Trustees of [address] [place] [address] [address] [address], under license to [employer]. All rights reserved     Score:  Initial: 19, completed, 12/13/2021 Most Recent: X (Date: -- )   Interpretation of Tool:  Represents activities that are increasingly more difficult (i.e. Bed mobility, Transfers, Gait). PLAN:   FREQUENCY/DURATION: OT Plan of Care: 3 times/week for duration of hospital stay or until stated goals are met, whichever comes first.    PROBLEM LIST:   (Skilled intervention is medically necessary to address:)  1. Decreased ADL/Functional Activities  2. Decreased Activity Tolerance  3. Decreased Balance  4. Decreased Gait Ability  5. Decreased Strength  6. Decreased Transfer Abilities   INTERVENTIONS PLANNED:   (Benefits and precautions of occupational therapy have been discussed with the patient.)  1. Self Care Training  2. Therapeutic Activity  3. Therapeutic Exercise/HEP  4. Neuromuscular Re-education  5.  Education     TREATMENT:     EVALUATION: Low Complexity : (Untimed Charge)    TREATMENT:   Co-Treatment PT/OT necessary due to patient's decreased overall endurance/tolerance levels, as well as need for high level skilled assistance to complete functional transfers/mobility and functional tasks  Self Care (15 Minutes): Self care including Toileting, Lower Body Dressing, Grooming and Energy Conservation Training to increase independence, decrease level of assistance required and increase activity tolerance. .    TREATMENT GRID:  N/A    AFTER TREATMENT POSITION/PRECAUTIONS:  Chair, Needs within reach, RN notified and table tray anterior to pt.     INTERDISCIPLINARY COLLABORATION:  RN/PCT, PT/PTA and OT/LEE    TOTAL TREATMENT DURATION:  OT Patient Time In/Time Out  Time In: 1009  Time Out: 1032    CHER Childress/ERNESTO

## 2021-12-13 NOTE — PROGRESS NOTES
Pt sitting up in chair. Pt on 2 L Nc. No distress noted at this time. Call light in reach, will monitor.

## 2021-12-13 NOTE — PROGRESS NOTES
Problem: Airway Clearance - Ineffective  Goal: Achieve or maintain patent airway  Outcome: Progressing Towards Goal     Problem: Gas Exchange - Impaired  Goal: Absence of hypoxia  Outcome: Progressing Towards Goal  Goal: Promote optimal lung function  Outcome: Progressing Towards Goal

## 2021-12-13 NOTE — PROGRESS NOTES
ACUTE PHYSICAL THERAPY GOALS:  (Developed with and agreed upon by patient and/or caregiver.)  (1.)Ms. Odalys Jett will move from supine to sit and sit to supine , scoot up and down and roll side to side in bed with MODIFIED INDEPENDENCE within 7 treatment day(s). (2.)Ms. Odalys Jett will transfer from bed to chair and chair to bed with MODIFIED INDEPENDENCE using the least restrictive device within 7 treatment day(s). (3.)Ms. Odalys Jett will ambulate with MODIFIED INDEPENDENCE for 100 feet with the least restrictive device within 7 treatment day(s). (4.)Ms. Odalys Jett will participate in therapeutic activity/exercises x 24 minutes for increased activity tolerance with SpO2 above 90% within 7 treatment days. PHYSICAL THERAPY: Daily Note and AM Treatment Day # 2    Linda Goldstein is a 64 y.o. female   PRIMARY DIAGNOSIS: Pneumonia due to COVID-19 virus  Pneumonia due to COVID-19 virus [U07.1, J12.82]         ASSESSMENT:     REHAB RECOMMENDATIONS: CURRENT LEVEL OF FUNCTION:  (Most Recently Demonstrated)   Recommendation to date pending progress:  [address] [place] [address] Therapy  Equipment:    To Be Determined Bed Mobility:   Contact Guard Assistance  Sit to Stand:   Minimal Assistance  Transfers:   Minimal Assistance  Gait/Mobility:   Minimal Assistance     ASSESSMENT:  Ms. Odalys Jett is a 64year old female admitted with pneumonia secondary to [place] 19 virus. Patient is seen this AM for PT treatment session. Addressed functional activity tolerance throughout transfers, dynamic standing balance activity, and ambulation within room. Sp02 decreased to 86% on activity requiring 3L and seated rest break to recover to >90%. Replace 2L 02 with Sp02 >90% at end of treatment. Goals remain appropriate and progressing. Continue with stated plan of care. SUBJECTIVE:   Ms. Odalys Jett states, \"I've never bed this sick. \"    SOCIAL HISTORY/ LIVING ENVIRONMENT: Lives with room mates and prior to getting sick pt was   independent with ambulation; recently getting help from room mates for ambulating     OBJECTIVE:     PAIN: VITAL SIGNS: LINES/DRAINS:   Pre Treatment: Pain Screen  Pain Scale 1: Numeric (0 - 10)  Pain Intensity 1: 0  Post Treatment: 0/10 Sp02 decreased to 86% on activity requiring 3L and seated rest break to recover to >90%. Replace 2L 02 with Sp02 >90% at end of treatment.   IV  O2 Device: Nasal cannula     MOBILITY: I Mod I S SBA CGA Min Mod Max Total  NT x2 Comments:   Bed Mobility    Rolling [] [] [] [] [] [] [] [] [] [x] []    Supine to Sit [] [] [] [] [x] [] [] [] [] [] []    Scooting [] [] [] [] [x] [] [] [] [] [] []    Sit to Supine [] [] [] [] [] [] [] [] [] [x] []    Transfers    Sit to Stand [] [] [] [] [x] [x] [] [] [] [] [] x1-2   Bed to Chair [] [] [] [] [x] [x] [] [] [] [] [] x1-2   Stand to Sit [] [] [] [] [x] [x] [] [] [] [] [] x1-2   I=Independent, Mod I=Modified Independent, S=Supervision, SBA=Standby Assistance, CGA=Contact Guard Assistance,   Min=Minimal Assistance, Mod=Moderate Assistance, Max=Maximal Assistance, Total=Total Assistance, NT=Not Tested    BALANCE: Good Fair+ Fair Fair- Poor NT Comments   Sitting Static [x] [] [] [] [] []    Sitting Dynamic [x] [] [] [] [] []              Standing Static [] [x] [] [] [] []    Standing Dynamic [] [] [x] [] [] []      GAIT: I Mod I S SBA CGA Min Mod Max Total  NT x2 Comments:   Level of Assistance [] [] [] [] [x] [x] [] [] [] [] [] x1-2   Distance x40ft    DME Intermittent handhold    Gait Quality Slow cadance, decreased step length bilaterally, fair dynamic balance, functional weakness    Weightbearing  Status N/A     I=Independent, Mod I=Modified Independent, S=Supervision, SBA=Standby Assistance, CGA=Contact Guard Assistance,   Min=Minimal Assistance, Mod=Moderate Assistance, Max=Maximal Assistance, Total=Total Assistance, NT=Not Tested    PLAN:   FREQUENCY/DURATION: PT Plan of Care: 3 times/week for duration of hospital stay or until stated goals are met, whichever comes first.  TREATMENT:     TREATMENT:   ($$ Gait Trainin-37 mins    )  Gait Training (23 Minutes): Gait training for 40 feet utilizing intermittent handheld assistance. Patient required Manual, Tactile, Verbal and Visual cueing to improve Activity Pacing, Assistive Device Utilization, Dynamic Standing Balance, Gait Mechanics and pre gait dynamic standing balance activity and cues for breathing throughout. At this time, patient is appropriate for Co-treatment with occupational therapy due to patient's decreased overall endurance/tolerance levels, as well as need for high level skilled assistance to complete functional transfers/mobility and functional tasks. Ronaldo Moran is appropriate for a multidisciplinary co-treatment of PT and OT to address goals of both disciplines.      TREATMENT GRID:  N/A    AFTER TREATMENT POSITION/PRECAUTIONS:  Chair, Needs within reach and RN notified    INTERDISCIPLINARY COLLABORATION:  RN/PCT, PT/PTA and OT/LEE    TOTAL TREATMENT DURATION:  PT Patient Time In/Time Out  Time In: 1009  Time Out: 9 Ebonie Conroy DPT

## 2021-12-13 NOTE — ROUTINE PROCESS
Patient afebrile this shift . No bowel movement  noted. Up with assist to bedside commode. Pt saturation at 88% on room air . Pt placed on 2 Lpm NC. Patient maintaining O2 saturation above 90%. Medications given as scheduled. Bed is low, locked and call light within reach.

## 2021-12-13 NOTE — ACP (ADVANCE CARE PLANNING)
Advance Care Planning     General Advance Care Planning (ACP) Conversation      Date of Conversation: 12/11/2021  Conducted with: Patient with Decision Making Capacity    Healthcare Decision Maker:   No healthcare decision makers have been documented. Click here to complete [address] including selection of the Healthcare Decision Maker Relationship (ie \"Primary\")    Today we documented desired Decision Maker(s), who is (are) NOT Legal Next of Kin. ACP documents are required for decision maker authority.     Content/Action Overview:   Has NO ACP documents/care preferences - requested patient complete ACP documents  Reviewed DNR/DNI and patient elects Full Code (Attempt Resuscitation)  Topics discussed: ventilation preferences and resuscitation preferences       Length of Voluntary ACP Conversation in minutes:  <16 minutes (Non-Billable)    Michell Briscoe RN

## 2021-12-13 NOTE — PROGRESS NOTES
Pt resting in bed. Pt alert oriented times 3 at this time. Pt on 2 L NC. Pt denies pain or distress at this time. Pt encouraged to call for assistance if needed call light in reach ,will monitor.

## 2021-12-13 NOTE — PROGRESS NOTES
MSN, CM:  Spoke with patient this AM about discharge planning. Patient lives with two roommates \"Brian\" in own house with 2 steps for entrance. Patient has brothers and sisters that live locally. Patient is independent with all ADL's and requires no equipment for ambulation. Patient denies any home oxygen or rehab in the past.  Patient as received [facility] in the past.  Patient confirms PCP is Dr. Mamta Giron and patient drives herself to all appointments. Patient works full time at [employer]. PT consulted for evaluation and recommendations. Case Management will continue to follow for any discharge needs. Care Management Interventions  PCP Verified by CM: Yes (Dr. Mamta Giron)  Mode of Transport at Discharge:  Other (see comment) (family to transport)  Health Maintenance Reviewed: Yes  Physical Therapy Consult: Yes  Support Systems: Friend/Neighbor  Confirm Follow Up Transport: Self  Freedom of Choice List was Provided with Basic Dialogue that Supports the Patient's Individualized Plan of Care/Goals, Treatment Preferences and Shares the Quality Data Associated with the Providers?: Yes  Discharge Location  Discharge Placement: Home

## 2021-12-13 NOTE — PROGRESS NOTES
Hospitalist Progress Note   Admit Date:  2021 10:33 PM   Name:  Ирина Jorge   Age:  64 y.o. Sex:  female  :  1965   MRN:  [id number]   Room:  [facility]    Presenting Complaint: Positive For Covid-19    Reason(s) for Admission: Pneumonia due to COVID-19 virus [U07.1, J12.82]     Hospital Course & Interval History:   Ирина Jorge is a 64 y.o. female with medical history of hypertension controlled with a single agent, fibromyalgia, psoriatic arthritis who was on sulfasalazine for a while but has not been on it in several years and so is not immunosuppressed, who is unvaccinated for Covid, and who presented with complaint of 1 week of cough, congestion, progressing to shortness of breath, weakness, fatigue, malaise, fever to 103, with onset of symptoms on 2021, seen in emergency MD for dehydration given 2 L of fluid and received a send off Covid test on Wednesday, 2021 followed by return visit on Thursday, 2021 for 2 more bags of IV fluids per her report. She found out on Friday that her Covid was positive. She is admitted for COVID. Subjective (21): Patient diarrhea has improved and her shortness of breath got worsened. Patient was vomiting yesterday and not able to tolerate p.o. medications. she stated she is not taking Zoloft at home and takes Cymbalta. Reviewed her medications over the phone. she is feeling very weak. Patient denies chest pain, nausea, vomiting, diarrhea, abdominal pain, dizziness, palpitations. Assessment & Plan:     Pneumonia due to COVID-19 virus  Patient is saturating well on room air  Chest x-ray showed bilateral infiltrates  CRP 7.9  Lactate procalcitonin is normal  Continue Decadron.   Baricitinib.      Thrombocytopenia   Most likely due to Covid  platelet count is 092K   Will monitor platelets     Diarrhea  Most likely due to Covid  Gentle Hydration  Follow-up with C. difficile  Follow-up with stool studies     Hypokalemia most likely due to poor oral intake  Resolved     Primary hypertension  Continue atenolol. Will monitor and adjust treatment as necessary.     Psoriatic arthritis ([address]  Continue Voltaren gel and tramadol as prescribed at home. Will monitor and adjust treatment as necessary. Patient also reports she takes Cymbalta.     Fibromyalgia  Continue tramadol and Cymbalta.      Dispo/Discharge Planning: Pending clinical stability     Diet: Low fat diet  VTE ppx: Lovenox  Code status: FULL CODE          Diet:  ADULT DIET Regular; Low Fat/Low Chol/High Fiber/GALINA  DVT PPx: Lovenox   Code status: Full Code    Hospital Problems as of 12/13/2021 Date Reviewed: 12/12/2021          Codes Class Noted - Resolved POA    * (Principal) Pneumonia due to COVID-19 virus ICD-10-CM: U07.1, J12.82  ICD-9-CM: 480.8, 079.89  12/12/2021 - Present Yes        Psoriatic arthritis ([facility] [address] ICD-10-CM: L40.50  ICD-9-CM: 696.0  12/12/2021 - Present Yes        Primary hypertension ICD-10-CM: I10  ICD-9-CM: 401.9  5/24/2013 - Present Yes        Fibromyalgia ICD-10-CM: M79.7  ICD-9-CM: 729.1  5/24/2013 - Present Yes              Objective:     Patient Vitals for the past 24 hrs:   Temp Pulse Resp BP SpO2   12/13/21 1136 99.7 °F (37.6 °C) 80 18 132/81 96 %   12/13/21 1032     93 %   12/13/21 0754 98.4 °F (36.9 °C) 86 19 130/79 96 %   12/13/21 0413 99 °F (37.2 °C) 79 17 (!) 148/87 95 %   12/13/21 0015 98.9 °F (37.2 °C) 80 17 132/78 90 %   12/12/21 1920 98.8 °F (37.1 °C) 70 18 (!) 146/88 93 %   12/12/21 1545 100.4 °F (38 °C) 78 18 (!) 153/79 91 %     Oxygen Therapy  O2 Sat (%): 96 % (12/13/21 1136)  Pulse via Oximetry: 69 beats per minute (12/12/21 0445)  O2 Device: Nasal cannula (12/13/21 1032)  O2 Flow Rate (L/min): 2 l/min (12/13/21 1032)    Estimated body mass index is 35.51 kg/m² as calculated from the following:    Height as of this encounter: 5' 6\" (1.676 m). Weight as of this encounter: 99.8 kg (220 lb).     Intake/Output Summary (Last 24 hours) at 12/13/2021 1513  Last data filed at 12/13/2021 0856  Gross per 24 hour   Intake 118 ml   Output 700 ml   Net -582 ml         Physical Exam:   Blood pressure 132/81, pulse 80, temperature 99.7 °F (37.6 °C), resp. rate 18, height 5' 6\" (1.676 m), weight 99.8 kg (220 lb), last menstrual period 11/02/2014, SpO2 96 %, not currently breastfeeding. General:    Well nourished. No overt distress  Head:  Normocephalic, atraumatic  Eyes:  Sclerae appear normal.  Pupils equally round. ENT:  Nares appear normal, no drainage. Moist oral mucosa  Neck:  No restricted ROM. Trachea midline   CV:   RRR. No m/r/g. No jugular venous distension. Lungs:   CTAB. No wheezing, rhonchi, or rales. Respirations even, unlabored  Abdomen: Bowel sounds present. Soft, nontender, nondistended. Extremities: No cyanosis or clubbing. No edema  Skin:     No rashes and normal coloration. Warm and dry. Neuro:  CN II-XII grossly intact. Sensation intact. A&Ox3  Psych:  Normal mood and affect.       I have reviewed ordered lab tests and independently visualized imaging below:    Recent Labs:  Recent Results (from the past 48 hour(s))   CULTURE, BLOOD    Collection Time: 12/11/21 10:56 PM    Specimen: Blood   Result Value Ref Range    Special Requests: NO SPECIAL REQUESTS      Culture result: NO GROWTH 1 DAY     LACTIC ACID    Collection Time: 12/11/21 10:56 PM   Result Value Ref Range    Lactic acid 1.2 0.4 - 2.0 MMOL/L   CBC WITH AUTOMATED DIFF    Collection Time: 12/11/21 10:56 PM   Result Value Ref Range    WBC 3.5 (L) 4.3 - 11.1 K/uL    RBC 4.95 4.05 - 5.2 M/uL    HGB 14.2 11.7 - 15.4 g/dL    HCT 42.4 35.8 - 46.3 %    MCV 85.7 79.6 - 97.8 FL    MCH 28.7 26.1 - 32.9 PG    MCHC 33.5 31.4 - 35.0 g/dL    RDW 12.4 11.9 - 14.6 %    PLATELET 341 (L) 734 - 450 K/uL    MPV 10.1 9.4 - 12.3 FL    ABSOLUTE NRBC 0.00 0.0 - 0.2 K/uL    DF AUTOMATED      NEUTROPHILS 75 43 - 78 %    LYMPHOCYTES 19 13 - 44 %    MONOCYTES 6 4.0 - 12.0 % EOSINOPHILS 0 (L) 0.5 - 7.8 %    BASOPHILS 0 0.0 - 2.0 %    IMMATURE GRANULOCYTES 0 0.0 - 5.0 %    ABS. NEUTROPHILS 2.6 1.7 - 8.2 K/UL    ABS. LYMPHOCYTES 0.6 0.5 - 4.6 K/UL    ABS. MONOCYTES 0.2 0.1 - 1.3 K/UL    ABS. EOSINOPHILS 0.0 0.0 - 0.8 K/UL    ABS. BASOPHILS 0.0 0.0 - 0.2 K/UL    ABS. IMM. GRANS. 0.0 0.0 - 0.5 K/UL   METABOLIC PANEL, COMPREHENSIVE    Collection Time: 12/11/21 10:56 PM   Result Value Ref Range    Sodium 138 136 - 145 mmol/L    Potassium 3.3 (L) 3.5 - 5.1 mmol/L    Chloride 103 98 - 107 mmol/L    CO2 25 21 - 32 mmol/L    Anion gap 10 7 - 16 mmol/L    Glucose 117 (H) 65 - 100 mg/dL    BUN 6 6 - 23 MG/DL    Creatinine 0.71 0.6 - 1.0 MG/DL    GFR est AA >60 >60 ml/min/1.73m2    GFR est non-AA >60 >60 ml/min/1.73m2    Calcium 8.6 8.3 - 10.4 MG/DL    Bilirubin, total 0.3 0.2 - 1.1 MG/DL    ALT (SGPT) 60 12 - 65 U/L    AST (SGOT) 44 (H) 15 - 37 U/L    Alk.  phosphatase 64 50 - 136 U/L    Protein, total 7.1 6.3 - 8.2 g/dL    Albumin 3.1 (L) 3.5 - 5.0 g/dL    Globulin 4.0 (H) 2.3 - 3.5 g/dL    A-G Ratio 0.8 (L) 1.2 - 3.5     PROCALCITONIN    Collection Time: 12/11/21 10:56 PM   Result Value Ref Range    Procalcitonin <0.05 0.00 - 0.49 ng/mL   C REACTIVE PROTEIN, QT    Collection Time: 12/11/21 10:56 PM   Result Value Ref Range    C-Reactive protein 7.9 (H) 0.0 - 0.9 mg/dL   POC URINE MACROSCOPIC    Collection Time: 12/12/21 12:50 AM   Result Value Ref Range    Spec. gravity (POC) >1.030 (H) 1.001 - 1.023    pH, urine  (POC) 6.0 5.0 - 9.0      Protein (POC) 100 (A) NEG mg/dL    Glucose, urine (POC) Negative NEG mg/dL    Ketones (POC) 80 (A) NEG mg/dL    Bilirubin (POC) SMALL (A) NEG      Blood (POC) Negative NEG      Urobilinogen (POC) 1.0 0.2 - 1.0 EU/dL    Nitrite (POC) Negative NEG      Leukocyte esterase (POC) Negative NEG     METABOLIC PANEL, COMPREHENSIVE    Collection Time: 12/12/21  6:37 AM   Result Value Ref Range    Sodium 140 136 - 145 mmol/L    Potassium 3.3 (L) 3.5 - 5.1 mmol/L    Chloride 103 98 - 107 mmol/L    CO2 28 21 - 32 mmol/L    Anion gap 9 7 - 16 mmol/L    Glucose 144 (H) 65 - 100 mg/dL    BUN 7 6 - 23 MG/DL    Creatinine 0.72 0.6 - 1.0 MG/DL    GFR est AA >60 >60 ml/min/1.73m2    GFR est non-AA >60 >60 ml/min/1.73m2    Calcium 8.6 8.3 - 10.4 MG/DL    Bilirubin, total 0.3 0.2 - 1.1 MG/DL    ALT (SGPT) 54 12 - 65 U/L    AST (SGOT) 39 (H) 15 - 37 U/L    Alk.  phosphatase 62 50 - 136 U/L    Protein, total 6.5 6.3 - 8.2 g/dL    Albumin 2.8 (L) 3.5 - 5.0 g/dL    Globulin 3.7 (H) 2.3 - 3.5 g/dL    A-G Ratio 0.8 (L) 1.2 - 3.5     C REACTIVE PROTEIN, QT    Collection Time: 12/12/21  6:37 AM   Result Value Ref Range    C-Reactive protein 8.3 (H) 0.0 - 0.9 mg/dL   FIBRINOGEN    Collection Time: 12/12/21  6:37 AM   Result Value Ref Range    Fibrinogen 569 (H) 190 - 501 mg/dL   PTT    Collection Time: 12/12/21  6:37 AM   Result Value Ref Range    aPTT 32.5 24.1 - 35.1 SEC   PROTHROMBIN TIME + INR    Collection Time: 12/12/21  6:37 AM   Result Value Ref Range    Prothrombin time 13.1 12.6 - 14.5 sec    INR 1.0     LD    Collection Time: 12/12/21  6:37 AM   Result Value Ref Range     (H) 100 - 190 U/L   FERRITIN    Collection Time: 12/12/21  6:37 AM   Result Value Ref Range    Ferritin 686 (H) 8 - 388 NG/ML   D DIMER    Collection Time: 12/12/21  6:37 AM   Result Value Ref Range    D DIMER 1.15 (H) <0.56 ug/ml(FEU)   TROPONIN-HIGH SENSITIVITY    Collection Time: 12/12/21  6:37 AM   Result Value Ref Range    Troponin-High Sensitivity 14.4 (H) 0 - 14 pg/mL   MAGNESIUM    Collection Time: 12/12/21  6:37 AM   Result Value Ref Range    Magnesium 2.1 1.8 - 2.4 mg/dL   PHOSPHORUS    Collection Time: 12/12/21  6:37 AM   Result Value Ref Range    Phosphorus 3.2 2.5 - 4.5 MG/DL   CBC WITH AUTOMATED DIFF    Collection Time: 12/12/21  7:43 AM   Result Value Ref Range    WBC 2.1 (L) 4.3 - 11.1 K/uL    RBC 4.43 4.05 - 5.2 M/uL    HGB 12.8 11.7 - 15.4 g/dL    HCT 38.7 35.8 - 46.3 %    MCV 87.4 79.6 - 97.8 FL    MCH 28.9 26.1 - 32.9 PG    MCHC 33.1 31.4 - 35.0 g/dL    RDW 12.4 11.9 - 14.6 %    PLATELET 641 (L) 902 - 450 K/uL    MPV 10.3 9.4 - 12.3 FL    ABSOLUTE NRBC 0.00 0.0 - 0.2 K/uL    DF AUTOMATED      NEUTROPHILS 80 (H) 43 - 78 %    LYMPHOCYTES 14 13 - 44 %    MONOCYTES 5 4.0 - 12.0 %    EOSINOPHILS 0 (L) 0.5 - 7.8 %    BASOPHILS 0 0.0 - 2.0 %    IMMATURE GRANULOCYTES 1 0.0 - 5.0 %    ABS. NEUTROPHILS 1.7 1.7 - 8.2 K/UL    ABS. LYMPHOCYTES 0.3 (L) 0.5 - 4.6 K/UL    ABS. MONOCYTES 0.1 0.1 - 1.3 K/UL    ABS. EOSINOPHILS 0.0 0.0 - 0.8 K/UL    ABS. BASOPHILS 0.0 0.0 - 0.2 K/UL    ABS. IMM. GRANS. 0.0 0.0 - 0.5 K/UL   COVID-19 RAPID TEST    Collection Time: 12/12/21  1:46 PM   Result Value Ref Range    Specimen source NASAL      COVID-19 rapid test Detected (AA) NOTD     CULTURE, STOOL    Collection Time: 12/12/21  4:40 PM    Specimen: Stool   Result Value Ref Range    Special Requests: NO SPECIAL REQUESTS      Culture result:        NO GROWTH AFTER SHORT PERIOD OF INCUBATION. FURTHER RESULTS TO FOLLOW AFTER OVERNIGHT INCUBATION. C REACTIVE PROTEIN, QT    Collection Time: 12/13/21  4:28 AM   Result Value Ref Range    C-Reactive protein 6.2 (H) 0.0 - 0.9 mg/dL   D DIMER    Collection Time: 12/13/21  4:28 AM   Result Value Ref Range    D DIMER 0.90 (H) <0.56 ug/ml(FEU)   CBC WITH AUTOMATED DIFF    Collection Time: 12/13/21  4:28 AM   Result Value Ref Range    WBC 5.7 4.3 - 11.1 K/uL    RBC 4.53 4.05 - 5.2 M/uL    HGB 12.7 11.7 - 15.4 g/dL    HCT 38.5 35.8 - 46.3 %    MCV 85.0 79.6 - 97.8 FL    MCH 28.0 26.1 - 32.9 PG    MCHC 33.0 31.4 - 35.0 g/dL    RDW 12.4 11.9 - 14.6 %    PLATELET 481 670 - 955 K/uL    MPV 10.2 9.4 - 12.3 FL    ABSOLUTE NRBC 0.00 0.0 - 0.2 K/uL    NEUTROPHILS 86 (H) 43 - 78 %    LYMPHOCYTES 9 (L) 13 - 44 %    MONOCYTES 5 4.0 - 12.0 %    EOSINOPHILS 0 (L) 0.5 - 7.8 %    BASOPHILS 0 0.0 - 2.0 %    IMMATURE GRANULOCYTES 0 0.0 - 5.0 %    ABS. NEUTROPHILS 4.9 1.7 - 8.2 K/UL    ABS.  LYMPHOCYTES 0.5 0.5 - 4.6 K/UL ABS. MONOCYTES 0.3 0.1 - 1.3 K/UL    ABS. EOSINOPHILS 0.0 0.0 - 0.8 K/UL    ABS. BASOPHILS 0.0 0.0 - 0.2 K/UL    ABS. IMM. GRANS. 0.0 0.0 - 0.5 K/UL    DF AUTOMATED     METABOLIC PANEL, BASIC    Collection Time: 12/13/21  4:28 AM   Result Value Ref Range    Sodium 138 136 - 145 mmol/L    Potassium 3.8 3.5 - 5.1 mmol/L    Chloride 105 98 - 107 mmol/L    CO2 26 21 - 32 mmol/L    Anion gap 7 7 - 16 mmol/L    Glucose 128 (H) 65 - 100 mg/dL    BUN 11 6 - 23 MG/DL    Creatinine 0.68 0.6 - 1.0 MG/DL    GFR est AA >60 >60 ml/min/1.73m2    GFR est non-AA >60 >60 ml/min/1.73m2    Calcium 8.7 8.3 - 10.4 MG/DL   MAGNESIUM    Collection Time: 12/13/21  4:28 AM   Result Value Ref Range    Magnesium 2.1 1.8 - 2.4 mg/dL   PHOSPHORUS    Collection Time: 12/13/21  4:28 AM   Result Value Ref Range    Phosphorus 3.5 2.5 - 4.5 MG/DL       All Micro Results     Procedure Component Value Units Date/Time    CULTURE, STOOL [[id number]] Collected: 12/12/21 1640    Order Status: Completed Specimen: Stool Updated: 12/13/21 1035     Special Requests: NO SPECIAL REQUESTS        Culture result:       NO GROWTH AFTER SHORT PERIOD OF INCUBATION. FURTHER RESULTS TO FOLLOW AFTER OVERNIGHT INCUBATION. BLOOD CULTURE [[id number]] Collected: 12/11/21 2256    Order Status: Completed Specimen: Blood Updated: 12/13/21 0703     Special Requests: NO SPECIAL REQUESTS        Culture result: NO GROWTH 1 DAY       OVA & PARASITES, STOOL [[id number]]     Order Status: Sent Specimen: Feces from Stool     COVID-19 RAPID TEST [[id number]]  (Abnormal) Collected: 12/12/21 1346    Order Status: Completed Specimen: Nasopharyngeal Updated: 12/12/21 1411     Specimen source NASAL        Comment: RAPID ONLY        COVID-19 rapid test Detected        Comment: RESULTS VERIFIED, PHONED TO AND READ BACK BY  Andreina Gonzalez RN ON 12/12/21 @1410, TA       The specimen is POSITIVE for SARS-CoV-2, the novel coronavirus associated with COVID-19.        This test has been authorized by the FDA under an Emergency Use Authorization (EUA) for use by authorized laboratories. Fact sheet for Healthcare Providers: ConventionUpdate.co.nz  Fact sheet for Patients: ConventionUpdate.co.nz       Methodology: Isothermal Nucleic Acid Amplification         BLOOD CULTURE [[id number]]     Order Status: Canceled Specimen: Blood           Other Studies:  No results found. Current Meds:  Current Facility-Administered Medications   Medication Dose Route Frequency    dexAMETHasone (DECADRON) tablet 6 mg  6 mg Oral DAILY    atenoloL (TENORMIN) tablet 50 mg  50 mg Oral DAILY    sertraline (ZOLOFT) tablet 50 mg  50 mg Oral DAILY    traMADoL (ULTRAM) tablet 50 mg  50 mg Oral Q6H PRN    sodium chloride (NS) flush 5-40 mL  5-40 mL IntraVENous Q8H    sodium chloride (NS) flush 5-40 mL  5-40 mL IntraVENous PRN    acetaminophen (TYLENOL) tablet 650 mg  650 mg Oral Q6H PRN    Or    acetaminophen (TYLENOL) suppository 650 mg  650 mg Rectal Q6H PRN    polyethylene glycol (MIRALAX) packet 17 g  17 g Oral DAILY PRN    ondansetron (ZOFRAN ODT) tablet 4 mg  4 mg Oral Q8H PRN    Or    ondansetron (ZOFRAN) injection 4 mg  4 mg IntraVENous Q6H PRN    guaiFENesin-dextromethorphan (ROBITUSSIN DM) 100-10 mg/5 mL syrup 5 mL  5 mL Oral Q4H PRN    benzonatate (TESSALON) capsule 100 mg  100 mg Oral TID PRN    baricitinib (OLUMIANT) tablet 4 mg  4 mg Oral DAILY    enoxaparin (LOVENOX) injection 30 mg  30 mg SubCUTAneous Q12H    diclofenac (VOLTAREN) 1 % topical gel 1-2 g  1-2 g Topical TID PRN    sodium chloride (NS) flush 5-10 mL  5-10 mL IntraVENous Q8H    sodium chloride (NS) flush 5-10 mL  5-10 mL IntraVENous PRN       Signed:  Mary Ann Barney MD    Part of this note may have been written by using a voice dictation software. The note has been proof read but may still contain some grammatical/other typographical errors.

## 2021-12-13 NOTE — PROGRESS NOTES
Had turned down rate to 25cc/hr for tolerance. Pt unable to tolerate infusion. C/o burning at site. IVPB stopped for now. Oncoming nurse updated.

## 2021-12-13 NOTE — ROUTINE PROCESS
Patient  refused  her potassium Chloride IV medication. Patient states that it burn /hurt so bad. I tried infusing it at lower rate 10ml/hr with her continuous IV fluid, she said she can't tolerate it. MD notified . Potassium chloride PO ordered.

## 2021-12-13 NOTE — PROGRESS NOTES
Patient is alert and oriented . Patient on room air. Utram 50mg given for generalized pain. Zofran  given for nausea. Up with assistance to bedside commode. Safety measures in placed.

## 2021-12-13 NOTE — PROGRESS NOTES
Comprehensive Nutrition Assessment    Type and Reason for Visit: Initial, Positive nutrition screen  Best Practice Alert for Malnutrition Screening Tool: Recently Lost Weight Without Trying: Yes, If Yes, How Much Weight Loss: Unsure, Eating Poorly Due to Decreased Appetite: Yes    Nutrition Recommendations/Plan:   Meals and Snacks:  Continue current diet. Nutrition Supplement Therapy:   Medical food supplement therapy:  Initiate Ensure Enlive three times per day (this provides 350 kcal and 20 grams protein per bottle)     Malnutrition Assessment:  Malnutrition Status: Insufficient data    Nutrition Assessment:   Nutrition History: Unable to assess 12/13  Cultural/Confucianism/Ethnic Food Preference(s): None   Nutrition Background: Pt with PMH notable for GERD, IBS, HTN, and psoriatic arthritis. Pt admitted for PNA due to COVID-19. Daily Update:  Pt contacted x3 by room phone and cell phone with no answer. Noted pt with nausea yesterday and received zofran. Diarrhea improved per MD note. Poor po intake per flowsheets noted. Nutrition Related Findings:   NFPE deferred due to isolation precautions. Current Nutrition Therapies:  ADULT DIET Regular; Low Fat/Low Chol/High Fiber/GALINA    Current Intake:   Average Meal Intake: 1-25% Average Supplement Intake: None ordered      Anthropometric Measures:  Height: 5' 6\" (167.6 cm)  Current Body Wt: 99.8 kg (220 lb 0.3 oz) (12/12), Weight source: Bed scale  BMI: 35.5, Obese class 2 (BMI 35.0-39. 9)  Admission Body Weight: 220 lb 0.3 oz (12/12; bed scale)  Ideal Body Weight (lbs) (Calculated): 130 lbs (59 kg), 169.2 %  Usual Body Wt:  (224-232lbs per EMR), Percent weight change:            Edema: No data recorded   Estimated Daily Nutrient Needs:  Energy (kcal/day): 2589-0325 (Kcal/kg (25-30), Weight Used: Ideal (59.1kg))  Protein (g/day): 71-89 (1.2-1.5gm/kg) Weight Used: (Ideal (59.1kg))  Fluid (ml/day):   (1 ml/kcal)    Nutrition Diagnosis:   · Predicted inadequate energy intake related to  (nausea) as evidenced by intake 0-25%    Nutrition Interventions:   Food and/or Nutrient Delivery: Continue current diet, Start oral nutrition supplement     Coordination of Nutrition Care: Continue to monitor while inpatient  Plan of Care discussed with IDT rounds    Goals: Active Goal: Meet >75% estimated nutrition needs by RD follow up. Nutrition Monitoring and Evaluation:      Food/Nutrient Intake Outcomes: Food and nutrient intake, Supplement intake  Physical Signs/Symptoms Outcomes: Biochemical data, GI status, Meal time behavior, Weight    Discharge Planning:     Too soon to determine    Electronically signed by King Mccall MS, RDN, LD 12/13/2021 at 6:03 PM  Contact: [phone]

## 2021-12-14 LAB
CRP SERPL-MCNC: 4.8 MG/DL (ref 0–0.9)
D DIMER PPP FEU-MCNC: 0.79 UG/ML(FEU)

## 2021-12-14 PROCEDURE — 74011250636 HC RX REV CODE- 250/636: Performed by: STUDENT IN AN ORGANIZED HEALTH CARE EDUCATION/TRAINING PROGRAM

## 2021-12-14 PROCEDURE — 77010033678 HC OXYGEN DAILY

## 2021-12-14 PROCEDURE — 97535 SELF CARE MNGMENT TRAINING: CPT

## 2021-12-14 PROCEDURE — 94760 N-INVAS EAR/PLS OXIMETRY 1: CPT

## 2021-12-14 PROCEDURE — 74011250637 HC RX REV CODE- 250/637: Performed by: EMERGENCY MEDICINE

## 2021-12-14 PROCEDURE — 65270000029 HC RM PRIVATE

## 2021-12-14 PROCEDURE — 74011250637 HC RX REV CODE- 250/637: Performed by: STUDENT IN AN ORGANIZED HEALTH CARE EDUCATION/TRAINING PROGRAM

## 2021-12-14 PROCEDURE — 86140 C-REACTIVE PROTEIN: CPT

## 2021-12-14 PROCEDURE — 97530 THERAPEUTIC ACTIVITIES: CPT

## 2021-12-14 PROCEDURE — 85379 FIBRIN DEGRADATION QUANT: CPT

## 2021-12-14 PROCEDURE — 36415 COLL VENOUS BLD VENIPUNCTURE: CPT

## 2021-12-14 RX ADMIN — Medication 10 ML: at 21:08

## 2021-12-14 RX ADMIN — Medication 10 ML: at 06:13

## 2021-12-14 RX ADMIN — ENOXAPARIN SODIUM 30 MG: 100 INJECTION SUBCUTANEOUS at 21:08

## 2021-12-14 RX ADMIN — ATENOLOL 50 MG: 50 TABLET ORAL at 09:12

## 2021-12-14 RX ADMIN — Medication 10 ML: at 16:25

## 2021-12-14 RX ADMIN — ENOXAPARIN SODIUM 30 MG: 100 INJECTION SUBCUTANEOUS at 09:14

## 2021-12-14 RX ADMIN — DEXAMETHASONE 6 MG: 4 TABLET ORAL at 09:13

## 2021-12-14 RX ADMIN — DULOXETINE HYDROCHLORIDE 30 MG: 30 CAPSULE, DELAYED RELEASE ORAL at 09:12

## 2021-12-14 RX ADMIN — Medication 10 ML: at 06:12

## 2021-12-14 NOTE — PROGRESS NOTES
Report received from [facility], [facility]. Patient in bed resting. Respirations even and unlabored. On 2L NC. All needs addressed. Safety measures in place. Call light in reach. No signs of acute distress at this time. Will continue to monitor.

## 2021-12-14 NOTE — PROGRESS NOTES
RT placed patient on RA, patient's SAT 90%, RT walked patient from the chair in her room to the end of the bed in her room, patient's SAT 86%, RT placed patient on 4 LPM NC for SAT of 91%. Patient stable at this time.  RN aware, case management aware as well

## 2021-12-14 NOTE — PROGRESS NOTES
Hospitalist Progress Note   Admit Date:  2021 10:33 PM   Name:  Yamilet Gray   Age:  64 y.o. Sex:  female  :  1965   MRN:  [id number]   Room:  [facility]/    Presenting Complaint: Positive For Covid-19    Reason(s) for Admission: Pneumonia due to COVID-19 virus [U07.1, J12.82]     Hospital Course & Interval History:   Yamilet Gray is a 64 y.o. female with medical history of hypertension controlled with a single agent, fibromyalgia, psoriatic arthritis who was on sulfasalazine for a while but has not been on it in several years and so is not immunosuppressed, who is unvaccinated for Covid, and who presented with complaint of 1 week of cough, congestion, progressing to shortness of breath, weakness, fatigue, malaise, fever to 103, with onset of symptoms on 2021, seen in emergency MD for dehydration given 2 L of fluid and received a send off Covid test on Wednesday, 2021 followed by return visit on Thursday, 2021 for 2 more bags of IV fluids per her report. She found out on Friday that her Covid was positive. She is admitted for COVID. Subjective (21):  Patient is not feeling well today. She is feeling really tired and weak. Shortness of breath is getting worse. She desaturated to 86% on room air and was placed on 4 L. Patient denies chest pain, nausea, vomiting, diarrhea, abdominal pain, dizziness, palpitations. Assessment & Plan:     Pneumonia due to COVID-19 virus  Patient is saturating well on 4L NC  Chest x-ray showed bilateral infiltrates  CRP 7.9  Lactate procalcitonin is normal  Continue Decadron and baricitinib.      Thrombocytopenia   Most likely due to Covid  No signs of bleeding     Diarrhea  Resolved  Most likely due to Covid  Gentle Hydration  Unable to obtain obtain stool sample for C. difficile and stool studies        Primary hypertension  Continue atenolol.   Will monitor and adjust treatment as necessary.     Psoriatic arthritis ([facility] [address]  Continue Voltaren gel and tramadol as prescribed at home. Will monitor and adjust treatment as necessary.       Fibromyalgia  Continue tramadol and Cymbalta.      Dispo/Discharge Plannin to 2 days     Diet: Low fat diet  VTE ppx: Lovenox  Code status: FULL CODE          Diet:  ADULT DIET Regular; Low Fat/Low Chol/High Fiber/GALINA  ADULT ORAL NUTRITION SUPPLEMENT Breakfast, Lunch, Dinner; Standard High Calorie/High Protein  DVT PPx: Lovenox   Code status: Full Code    Hospital Problems as of 2021 Date Reviewed: 2021          Codes Class Noted - Resolved POA    * (Principal) Pneumonia due to COVID-19 virus ICD-10-CM: U07.1, J12.82  ICD-9-CM: 480.8, 079.89  2021 - Present Yes        Psoriatic arthritis ([facility] [address] ICD-10-CM: L40.50  ICD-9-CM: 696.0  2021 - Present Yes        Primary hypertension ICD-10-CM: I10  ICD-9-CM: 401.9  2013 - Present Yes        Fibromyalgia ICD-10-CM: M79.7  ICD-9-CM: 729.1  2013 - Present Yes              Objective:     Patient Vitals for the past 24 hrs:   Temp Pulse Resp BP SpO2   21 1526 98.7 °F (37.1 °C) 82 18 134/77 92 %   21 1156 98.1 °F (36.7 °C) 76 21 (!) 149/84 94 %   21 0747 98.9 °F (37.2 °C) 81 18 111/69 90 %   21 0259 98.5 °F (36.9 °C) 73 20 (!) 142/82 91 %   21 2251 99.7 °F (37.6 °C) 70 18 (!) 141/85 94 %   21 1921 98.7 °F (37.1 °C) 78 19 (!) 152/81 91 %     Oxygen Therapy  O2 Sat (%): 92 % (21 1526)  Pulse via Oximetry: 69 beats per minute (21 0445)  O2 Device: Nasal cannula (21 0035)  O2 Flow Rate (L/min): 2 l/min (21)    Estimated body mass index is 35.51 kg/m² as calculated from the following:    Height as of this encounter: 5' 6\" (1.676 m). Weight as of this encounter: 99.8 kg (220 lb).     Intake/Output Summary (Last 24 hours) at 2021 1536  Last data filed at 2021 0914  Gross per 24 hour   Intake 236 ml   Output    Net 236 ml         Physical Exam:   Blood pressure 134/77, pulse 82, temperature 98.7 °F (37.1 °C), resp. rate 18, height 5' 6\" (1.676 m), weight 99.8 kg (220 lb), last menstrual period 11/02/2014, SpO2 92 %, not currently breastfeeding. General:    Well nourished. No overt distress  Head:  Normocephalic, atraumatic  Eyes:  Sclerae appear normal.  Pupils equally round. ENT:  Nares appear normal, no drainage. Moist oral mucosa  Neck:  No restricted ROM. Trachea midline   CV:   RRR. No m/r/g. No jugular venous distension. Lungs:   CTAB. No wheezing, rhonchi, or rales. Respirations even, unlabored  Abdomen: Bowel sounds present. Soft, nontender, nondistended. Extremities: No cyanosis or clubbing. No edema  Skin:     No rashes and normal coloration. Warm and dry. Neuro:  CN II-XII grossly intact. Sensation intact. A&Ox3  Psych:  Normal mood and affect.       I have reviewed ordered lab tests and independently visualized imaging below:    Recent Labs:  Recent Results (from the past 48 hour(s))   CULTURE, STOOL    Collection Time: 12/12/21  4:40 PM    Specimen: Stool   Result Value Ref Range    Special Requests: NO SPECIAL REQUESTS      Culture result:        NO GROWTH OF SALMONELLA OR SHIGELLA IS EVIDENT ON FIRST READING, FINAL REPORT TO FOLLOW AFTER FURTHER INCUBATION OF CULTURE   C REACTIVE PROTEIN, QT    Collection Time: 12/13/21  4:28 AM   Result Value Ref Range    C-Reactive protein 6.2 (H) 0.0 - 0.9 mg/dL   D DIMER    Collection Time: 12/13/21  4:28 AM   Result Value Ref Range    D DIMER 0.90 (H) <0.56 ug/ml(FEU)   CBC WITH AUTOMATED DIFF    Collection Time: 12/13/21  4:28 AM   Result Value Ref Range    WBC 5.7 4.3 - 11.1 K/uL    RBC 4.53 4.05 - 5.2 M/uL    HGB 12.7 11.7 - 15.4 g/dL    HCT 38.5 35.8 - 46.3 %    MCV 85.0 79.6 - 97.8 FL    MCH 28.0 26.1 - 32.9 PG    MCHC 33.0 31.4 - 35.0 g/dL    RDW 12.4 11.9 - 14.6 %    PLATELET 618 835 - 263 K/uL    MPV 10.2 9.4 - 12.3 FL    ABSOLUTE NRBC 0.00 0.0 - 0.2 K/uL    NEUTROPHILS 86 (H) 43 - 78 % LYMPHOCYTES 9 (L) 13 - 44 %    MONOCYTES 5 4.0 - 12.0 %    EOSINOPHILS 0 (L) 0.5 - 7.8 %    BASOPHILS 0 0.0 - 2.0 %    IMMATURE GRANULOCYTES 0 0.0 - 5.0 %    ABS. NEUTROPHILS 4.9 1.7 - 8.2 K/UL    ABS. LYMPHOCYTES 0.5 0.5 - 4.6 K/UL    ABS. MONOCYTES 0.3 0.1 - 1.3 K/UL    ABS. EOSINOPHILS 0.0 0.0 - 0.8 K/UL    ABS. BASOPHILS 0.0 0.0 - 0.2 K/UL    ABS. IMM.  GRANS. 0.0 0.0 - 0.5 K/UL    DF AUTOMATED     METABOLIC PANEL, BASIC    Collection Time: 12/13/21  4:28 AM   Result Value Ref Range    Sodium 138 136 - 145 mmol/L    Potassium 3.8 3.5 - 5.1 mmol/L    Chloride 105 98 - 107 mmol/L    CO2 26 21 - 32 mmol/L    Anion gap 7 7 - 16 mmol/L    Glucose 128 (H) 65 - 100 mg/dL    BUN 11 6 - 23 MG/DL    Creatinine 0.68 0.6 - 1.0 MG/DL    GFR est AA >60 >60 ml/min/1.73m2    GFR est non-AA >60 >60 ml/min/1.73m2    Calcium 8.7 8.3 - 10.4 MG/DL   MAGNESIUM    Collection Time: 12/13/21  4:28 AM   Result Value Ref Range    Magnesium 2.1 1.8 - 2.4 mg/dL   PHOSPHORUS    Collection Time: 12/13/21  4:28 AM   Result Value Ref Range    Phosphorus 3.5 2.5 - 4.5 MG/DL   C REACTIVE PROTEIN, QT    Collection Time: 12/14/21  4:20 AM   Result Value Ref Range    C-Reactive protein 4.8 (H) 0.0 - 0.9 mg/dL   D DIMER    Collection Time: 12/14/21  4:20 AM   Result Value Ref Range    D DIMER 0.79 (H) <0.56 ug/ml(FEU)       All Micro Results     Procedure Component Value Units Date/Time    CULTURE, STOOL [[id number]] Collected: 12/12/21 1640    Order Status: Completed Specimen: Stool Updated: 12/14/21 0817     Special Requests: NO SPECIAL REQUESTS        Culture result:       NO GROWTH OF SALMONELLA OR SHIGELLA IS EVIDENT ON FIRST READING, FINAL REPORT TO FOLLOW AFTER FURTHER INCUBATION OF CULTURE          BLOOD CULTURE [[id number]] Collected: 12/11/21 2256    Order Status: Completed Specimen: Blood Updated: 12/14/21 0615     Special Requests: NO SPECIAL REQUESTS        Culture result: NO GROWTH 2 DAYS       OVA & PARASITES, STOOL [[id number]]     Order Status: Sent Specimen: Feces from Stool     COVID-19 RAPID TEST [[id number]]  (Abnormal) Collected: 12/12/21 1346    Order Status: Completed Specimen: Nasopharyngeal Updated: 12/12/21 1411     Specimen source NASAL        Comment: RAPID ONLY        COVID-19 rapid test Detected        Comment: RESULTS VERIFIED, PHONED TO AND READ BACK BY  Lisa Rao RN ON 12/12/21 @1410, TA       The specimen is POSITIVE for SARS-CoV-2, the novel coronavirus associated with COVID-19. This test has been authorized by the FDA under an Emergency Use Authorization (EUA) for use by authorized laboratories. Fact sheet for Healthcare Providers: Tipbit.co.nz  Fact sheet for Patients: Comuto.nz       Methodology: Isothermal Nucleic Acid Amplification         BLOOD CULTURE [[id number]]     Order Status: Canceled Specimen: Blood           Other Studies:  No results found.     Current Meds:  Current Facility-Administered Medications   Medication Dose Route Frequency    dexAMETHasone (DECADRON) tablet 6 mg  6 mg Oral DAILY    DULoxetine (CYMBALTA) capsule 30 mg  30 mg Oral DAILY    atenoloL (TENORMIN) tablet 50 mg  50 mg Oral DAILY    traMADoL (ULTRAM) tablet 50 mg  50 mg Oral Q6H PRN    sodium chloride (NS) flush 5-40 mL  5-40 mL IntraVENous Q8H    sodium chloride (NS) flush 5-40 mL  5-40 mL IntraVENous PRN    acetaminophen (TYLENOL) tablet 650 mg  650 mg Oral Q6H PRN    Or    acetaminophen (TYLENOL) suppository 650 mg  650 mg Rectal Q6H PRN    polyethylene glycol (MIRALAX) packet 17 g  17 g Oral DAILY PRN    ondansetron (ZOFRAN ODT) tablet 4 mg  4 mg Oral Q8H PRN    Or    ondansetron (ZOFRAN) injection 4 mg  4 mg IntraVENous Q6H PRN    guaiFENesin-dextromethorphan (ROBITUSSIN DM) 100-10 mg/5 mL syrup 5 mL  5 mL Oral Q4H PRN    benzonatate (TESSALON) capsule 100 mg  100 mg Oral TID PRN    baricitinib (OLUMIANT) tablet 4 mg  4 mg Oral DAILY    enoxaparin (LOVENOX) injection 30 mg  30 mg SubCUTAneous Q12H    diclofenac (VOLTAREN) 1 % topical gel 1-2 g  1-2 g Topical TID PRN    sodium chloride (NS) flush 5-10 mL  5-10 mL IntraVENous Q8H    sodium chloride (NS) flush 5-10 mL  5-10 mL IntraVENous PRN       Signed:  Blu Smith MD    Part of this note may have been written by using a voice dictation software. The note has been proof read but may still contain some grammatical/other typographical errors.

## 2021-12-14 NOTE — PROGRESS NOTES
ACUTE PHYSICAL THERAPY GOALS:  (Developed with and agreed upon by patient and/or caregiver.)  (1.)Ms. Beltran Rivera will move from supine to sit and sit to supine , scoot up and down and roll side to side in bed with MODIFIED INDEPENDENCE within 7 treatment day(s). goal met  21  (2.)Ms. Beltran Rivera will transfer from bed to chair and chair to bed with MODIFIED INDEPENDENCE using the least restrictive device within 7 treatment day(s). (3.)Ms. Beltarn Rivera will ambulate with MODIFIED INDEPENDENCE for 100 feet with the least restrictive device within 7 treatment day(s). (4.)Ms. Beltran Rivera will participate in therapeutic activity/exercises x 24 minutes for increased activity tolerance with SpO2 above 90% within 7 treatment days. PHYSICAL THERAPY: Daily Note and AM Treatment Day # 3    Oanh Morley is a 64 y.o. female   PRIMARY DIAGNOSIS: Pneumonia due to COVID-19 virus  Pneumonia due to COVID-19 virus [U07.1, J12.82]         ASSESSMENT:     REHAB RECOMMENDATIONS: CURRENT LEVEL OF FUNCTION:  (Most Recently Demonstrated)   Recommendation to date pending progress:  [address] [place] [address] Therapy  Equipment:    To Be Determined Bed Mobility:   Supervision  Sit to Stand:   Contact Guard Assistance  Transfers:   Contact Guard Assistance  Gait/Mobility:   Minimal Assistance     ASSESSMENT:  Ms. Betlran Rivera is a 64year old female admitted with pneumonia secondary to [place] 19 virus. Patient is seen this AM for PT treatment session and is supine in bed and agreeable to therapy. Bed mobility is with supervision with additional time for task completion. Scooting and sitting edge of bed. Sit to stand and transfer to the recliner with hand held assist.  Once rested the patient participated in therapeutic exercises. Tolerated well. O2 increased to 3Lpm during treatment session as the patient saturations were 86-88%. Good session and progress demonstrated. Patient is left sitting in the recliner with needs within reach.    Goals remain appropriate and progressing. Continue with stated plan of care.   Home with HHPT     SUBJECTIVE:   Ms. Mona Bo states, \"Good morning\"    SOCIAL HISTORY/ LIVING ENVIRONMENT: Lives with room mates and prior to getting sick pt was   independent with ambulation; recently getting help from room mates for ambulating  Home Environment: Private residence  One/Two Story Residence: One story  Living Alone: No  Support Systems: Friend/Neighbor  OBJECTIVE:     PAIN: VITAL SIGNS: LINES/DRAINS:   Pre Treatment: Pain Screen  Pain Scale 1: Numeric (0 - 10)  Pain Intensity 1: 0  Post Treatment: 0/10 Increased O2 to 3Lpm during the treatment session RN notified IV  O2 Device: Nasal cannula     MOBILITY: I Mod I S SBA CGA Min Mod Max Total  NT x2 Comments:   Bed Mobility    Rolling [] [] [x] [] [] [] [] [] [] [] []    Supine to Sit [] [] [x] [] [] [] [] [] [] [] []    Scooting [] [] [x] [] [] [] [] [] [] [] []    Sit to Supine [] [] [] [] [] [] [] [] [] [x] []    Transfers    Sit to Stand [] [] [] [] [x] [] [] [] [] [] []    Bed to Chair [] [] [] [] [x] [] [] [] [] [] []    Stand to Sit [] [] [] [] [x] [] [] [] [] [] []    I=Independent, Mod I=Modified Independent, S=Supervision, SBA=Standby Assistance, CGA=Contact Guard Assistance,   Min=Minimal Assistance, Mod=Moderate Assistance, Max=Maximal Assistance, Total=Total Assistance, NT=Not Tested    BALANCE: Good Fair+ Fair Fair- Poor NT Comments   Sitting Static [x] [] [] [] [] []    Sitting Dynamic [x] [] [] [] [] []              Standing Static [] [x] [] [] [] []    Standing Dynamic [] [x] [] [] [] []      GAIT: I Mod I S SBA CGA Min Mod Max Total  NT x2 Comments:   Level of Assistance [] [] [] [] [] [x] [] [] [] [] [] x1-2   Distance 10ft    DME  handhold    Gait Quality Slow cadance, decreased step length bilaterally, fair dynamic balance, functional weakness    Weightbearing  Status N/A     I=Independent, Mod I=Modified Independent, S=Supervision, SBA=Standby Assistance, CGA=Contact Guard Assistance,   Min=Minimal Assistance, Mod=Moderate Assistance, Max=Maximal Assistance, Total=Total Assistance, NT=Not Tested    PLAN:   FREQUENCY/DURATION: PT Plan of Care: 3 times/week for duration of hospital stay or until stated goals are met, whichever comes first.  TREATMENT:     TREATMENT:   ($$ Therapeutic Activity: 23-37 mins    )  Therapeutic Activity (28 Minutes): Therapeutic activity included Rolling, Supine to Sit, Scooting, Transfer Training, Ambulation on level ground, Sitting balance , Standing balance and LE exercises to improve functional Mobility, Strength and Activity tolerance.       TREATMENT GRID:  N/A    AFTER TREATMENT POSITION/PRECAUTIONS:  Chair, Needs within reach and RN notified    INTERDISCIPLINARY COLLABORATION:  RN/PCT and PT/PTA    TOTAL TREATMENT DURATION:  PT Patient Time In/Time Out  Time In: 1013  Time Out: 36    [facility], PTA

## 2021-12-14 NOTE — PROGRESS NOTES
Resting quietly at present. NAD noted. Remains on droplet plus isolation for positive COVID-19 screening. Ordered PPE in place and in use. Report no diarrhea throughout this shift. Resting comfortable with O2 4 liters via NC at rest. D-sats easily on exertion to 87% and requiring O2 increased up to 7L to maintain sats above 90%. Appetite poor. No c/o n/v this shift. \"just don't feel like eating. Don't have much appetite\". Taking liquids as tolerated. Has not been able to use I/S today. \"I just can't do it right now\". Safety maintained through out the shift. To report off to oncoming nurse.

## 2021-12-14 NOTE — PROGRESS NOTES
Bedside report received from night nurse Leonidas Garland. Assessment done as noted  Respiration even and unlabored 20/min; denies pain or nausea at present. Appetite poor. Remains  Afebrile this morning. Non-productive coughing at interval. O2 intact with 2 liters via nasal canula with O2 sats 93% at rest. Remains on Droplet plus isolation for positive COVID-19 screening. Ordered PPE in place and in use. Encouraged to call with needs.

## 2021-12-14 NOTE — PROGRESS NOTES
ACUTE OT GOALS:  (Developed with and agreed upon by patient and/or caregiver.)  1. Patient will complete lower body bathing and dressing with Mod I and adaptive equipment as needed. 2. Patient will complete toileting with Mod I and adaptive equipment as needed. 3. Patient will tolerate 23 minutes of OT treatment with 1-2 rest breaks to increase activity tolerance for ADLs. 4. Patient will complete functional transfers with Mod I and adaptive equipment as needed. 5. Patient will complete standing grooming ADL with Mod I and seated rest breaks as needed. 6. Patient will verbalize and demonstrate at least 3 energy conservation techniques to increase activity tolerance for ADLs.    Timeframe: 7 visits     OCCUPATIONAL THERAPY: Daily Note and PM OT Treatment Day # 2    Tk Auguste is a 64 y.o. female   PRIMARY DIAGNOSIS: Pneumonia due to COVID-19 virus  Pneumonia due to COVID-19 virus [U07.1, J12.82]       Payor: BLUE CROSS / Plan: [place] BL[place] [facility] / Product Type: PPO /   ASSESSMENT:     REHAB RECOMMENDATIONS: CURRENT LEVEL OF FUNCTION:  (Most Recently Demonstrated)   Recommendation to date pending progress:  [facility]ettin [facility] pending improvement. Equipment:    To Be Determined. Pt has SC. May need RW to increase independence for functional mobility; will continue to assess. Bathing:   Not tested  Dressing:   Contact Guard Assistance to complete clothing management in standing. Feeding/Grooming:   Set Up in seated due to decreased activity tolerance to perform standing at sink. Toiletin [address] transfers. Functional Mobility:   Contact Guard Assistance x RW     ASSESSMENT:  Ms. Cameron Dang continues to present with deficits in overall strength, balance, and activity tolerance for performance of ADLs and functional mobility, with minimal progress made towards acute care OT goals this treatment session.  Received on 4L O2 with O2 sats in low 90s at rest. Requires Supervision to complete supine <> sit. Desaturates to 87% seated EOB. Reminded/cued to use pursed-lip breathing technique to improve but pt unable to improve and requires increase to 6L O2 to improve O2 sats to 90%. Pt increased to 7L O2 in preparation for functional mobility to complete ADL. Requires CGA x RW to complete sit <> stand and CGA x RW to walk from bed <> commode. Requires CGA to complete clothing management in standing in preparation for toileting and CGA to complete stand <> sit. Desaturates to 88% following transfer to commode; able to improve to 91% with use of pursed-lip breathing and increased seated rest break. Requires SBA to complete wiping in seated, CGA x RW to complete sit <> stand, and CGA to complete clothing management in standing. Requires CGA x RW to return to EOB with O2 sats 90% post-activity. Requires Set Up to clean hands with use of hand  due to decreased activity tolerance to wash hands standing at sink. Requires Supervision to complete sit <> supine. Pt weaned back down to 4L O2 with O2 sats at 92% at rest. Pt would benefit from continued skilled OT to increase independence and safety for performance of ADLs and functional mobility. SUBJECTIVE:   Ms. Amalia Shepherd states, \"I need to go to the bathroom. \" Upon therapist entry. SOCIAL HISTORY/LIVING ENVIRONMENT: Lives with two roommates who work during the day in one story home with a couple of JUAN. Independent for ADLs and functional mobility. Working in manual labor position. Pt niece has brought a SC to her home.    Home Environment: Private residence  One/Two Story Residence: One story  Living Alone: No  Support Systems: Friend/Neighbor    OBJECTIVE:     PAIN: VITAL SIGNS: LINES/DRAINS:   Pre Treatment: Pain Screen  Pain Scale 1: Numeric (0 - 10)  Pain Intensity 1: 0  Post Treatment: Unchanged Vital Signs  O2 Sat (%): 92 %  O2 Device: Nasal cannula  O2 Flow Rate (L/min): 4 l/min None  O2 Device: Nasal cannula     ACTIVITIES OF DAILY LIVING: I Mod I S SBA CGA Min Mod Max Total NT Comments   BASIC ADLs:              Bathing/ Showering [] [] [] [] [] [] [] [] [] [x]    Toileting [] [] [] [] [x] [] [] [] [] [] Transfers and clothing management in preparation/following toileting. Dressing [] [] [] [] [x] [] [] [] [] [] Clothing management in standing. Feeding [] [] [] [] [] [] [] [] [] [x]    Grooming [] [] [x] [] [] [] [] [] [] [] Set Up to clean hands with use of hand . Personal Device Care [] [] [] [] [] [] [] [] [] [x]    Functional Mobility [] [] [] [] [x] [] [] [] [] [] X RW   I=Independent, Mod I=Modified Independent, S=Supervision, SBA=Standby Assistance, CGA=Contact Guard Assistance,   Min=Minimal Assistance, Mod=Moderate Assistance, Max=Maximal Assistance, Total=Total Assistance, NT=Not Tested    MOBILITY: I Mod I S SBA CGA Min Mod Max Total  NT x2 Comments:   Supine to sit [] [] [x] [] [] [] [] [] [] [] []    Sit to supine [] [] [x] [] [] [] [] [] [] [] []    Sit to stand [] [] [] [] [x] [] [] [] [] [] [] X RW   Bed to chair [] [] [] [] [] [] [] [] [] [x] [] CGA x RW to complete in room and bathroom mobility. Pt very kindly declined sitting in chair due to increased fatigue and having sat up in chair earlier in day.     I=Independent, Mod I=Modified Independent, S=Supervision, SBA=Standby Assistance, CGA=Contact Guard Assistance,   Min=Minimal Assistance, Mod=Moderate Assistance, Max=Maximal Assistance, Total=Total Assistance, NT=Not Tested    BALANCE: Good Fair+ Fair Fair- Poor NT Comments   Sitting Static [x] [] [] [] [] []    Sitting Dynamic [] [x] [] [] [] []              Standing Static [] [] [x] [] [] [] CGA x RW   Standing Dynamic [] [] [x] [] [] [] CGA x RW     PLAN:   FREQUENCY/DURATION: OT Plan of Care: 3 times/week for duration of hospital stay or until stated goals are met, whichever comes first.    TREATMENT:   TREATMENT:   ($$ Self Care/Home Management: 8-22 mins$$ Therapeutic Activity: 8-22 mins   )  Therapeutic Activity (15 Minutes): Therapeutic activity included Supine to Sit, Sit to Supine, Scooting, Transfer Training, Ambulation on level ground, Sitting balance  and Standing balance to improve functional Mobility, Strength and Activity tolerance. Self Care (9 Minutes): Self care including Toileting, Grooming and Energy Conservation Training to increase independence, decrease level of assistance required and increase activity tolerance. .    TREATMENT GRID:  N/A    AFTER TREATMENT POSITION/PRECAUTIONS:  Bed, Needs within reach, RN notified and head of bed elevated.      INTERDISCIPLINARY COLLABORATION:  RN/PCT and OT/LEE    TOTAL TREATMENT DURATION:  OT Patient Time In/Time Out  Time In: 1503  Time Out: [address] STAR ChapmanR/L

## 2021-12-15 ENCOUNTER — APPOINTMENT (OUTPATIENT)
Dept: CT IMAGING | Age: 56
DRG: 871 | End: 2021-12-15
Attending: INTERNAL MEDICINE
Payer: COMMERCIAL

## 2021-12-15 PROBLEM — D69.6 THROMBOCYTOPENIA (HCC): Status: RESOLVED | Noted: 2021-12-15 | Resolved: 2021-12-15

## 2021-12-15 PROBLEM — R19.7 DIARRHEA: Status: RESOLVED | Noted: 2021-12-15 | Resolved: 2021-12-15

## 2021-12-15 PROBLEM — A41.9 SEPSIS (HCC): Status: ACTIVE | Noted: 2021-12-15

## 2021-12-15 PROBLEM — R19.7 DIARRHEA: Status: ACTIVE | Noted: 2021-12-15

## 2021-12-15 PROBLEM — A41.9 SEPSIS (HCC): Status: RESOLVED | Noted: 2021-12-15 | Resolved: 2021-12-15

## 2021-12-15 PROBLEM — D69.6 THROMBOCYTOPENIA (HCC): Status: ACTIVE | Noted: 2021-12-15

## 2021-12-15 LAB
ANION GAP SERPL CALC-SCNC: 5 MMOL/L (ref 7–16)
BACTERIA SPEC CULT: NORMAL
BASOPHILS # BLD: 0 K/UL (ref 0–0.2)
BASOPHILS NFR BLD: 0 % (ref 0–2)
BUN SERPL-MCNC: 16 MG/DL (ref 6–23)
CALCIUM SERPL-MCNC: 9.1 MG/DL (ref 8.3–10.4)
CHLORIDE SERPL-SCNC: 100 MMOL/L (ref 98–107)
CO2 SERPL-SCNC: 30 MMOL/L (ref 21–32)
CREAT SERPL-MCNC: 0.65 MG/DL (ref 0.6–1)
DIFFERENTIAL METHOD BLD: ABNORMAL
EOSINOPHIL # BLD: 0 K/UL (ref 0–0.8)
EOSINOPHIL NFR BLD: 0 % (ref 0.5–7.8)
ERYTHROCYTE [DISTWIDTH] IN BLOOD BY AUTOMATED COUNT: 12.1 % (ref 11.9–14.6)
GLUCOSE SERPL-MCNC: 102 MG/DL (ref 65–100)
HCT VFR BLD AUTO: 40.7 % (ref 35.8–46.3)
HGB BLD-MCNC: 13.7 G/DL (ref 11.7–15.4)
IMM GRANULOCYTES # BLD AUTO: 0 K/UL (ref 0–0.5)
IMM GRANULOCYTES NFR BLD AUTO: 1 % (ref 0–5)
LYMPHOCYTES # BLD: 0.4 K/UL (ref 0.5–4.6)
LYMPHOCYTES NFR BLD: 4 % (ref 13–44)
MAGNESIUM SERPL-MCNC: 2.4 MG/DL (ref 1.8–2.4)
MCH RBC QN AUTO: 28.4 PG (ref 26.1–32.9)
MCHC RBC AUTO-ENTMCNC: 33.7 G/DL (ref 31.4–35)
MCV RBC AUTO: 84.3 FL (ref 79.6–97.8)
MONOCYTES # BLD: 0.8 K/UL (ref 0.1–1.3)
MONOCYTES NFR BLD: 9 % (ref 4–12)
NEUTS SEG # BLD: 7 K/UL (ref 1.7–8.2)
NEUTS SEG NFR BLD: 86 % (ref 43–78)
NRBC # BLD: 0 K/UL (ref 0–0.2)
PHOSPHATE SERPL-MCNC: 3.8 MG/DL (ref 2.5–4.5)
PLATELET # BLD AUTO: 271 K/UL (ref 150–450)
PMV BLD AUTO: 10 FL (ref 9.4–12.3)
POTASSIUM SERPL-SCNC: 3.9 MMOL/L (ref 3.5–5.1)
RBC # BLD AUTO: 4.83 M/UL (ref 4.05–5.2)
SERVICE CMNT-IMP: NORMAL
SODIUM SERPL-SCNC: 135 MMOL/L (ref 136–145)
WBC # BLD AUTO: 8.2 K/UL (ref 4.3–11.1)

## 2021-12-15 PROCEDURE — 36415 COLL VENOUS BLD VENIPUNCTURE: CPT

## 2021-12-15 PROCEDURE — 85025 COMPLETE CBC W/AUTO DIFF WBC: CPT

## 2021-12-15 PROCEDURE — 74011000636 HC RX REV CODE- 636: Performed by: INTERNAL MEDICINE

## 2021-12-15 PROCEDURE — 74011000258 HC RX REV CODE- 258: Performed by: INTERNAL MEDICINE

## 2021-12-15 PROCEDURE — 65270000029 HC RM PRIVATE

## 2021-12-15 PROCEDURE — 83735 ASSAY OF MAGNESIUM: CPT

## 2021-12-15 PROCEDURE — 74011250636 HC RX REV CODE- 250/636: Performed by: STUDENT IN AN ORGANIZED HEALTH CARE EDUCATION/TRAINING PROGRAM

## 2021-12-15 PROCEDURE — 80048 BASIC METABOLIC PNL TOTAL CA: CPT

## 2021-12-15 PROCEDURE — 74011250637 HC RX REV CODE- 250/637: Performed by: STUDENT IN AN ORGANIZED HEALTH CARE EDUCATION/TRAINING PROGRAM

## 2021-12-15 PROCEDURE — 97530 THERAPEUTIC ACTIVITIES: CPT

## 2021-12-15 PROCEDURE — 74011250637 HC RX REV CODE- 250/637: Performed by: EMERGENCY MEDICINE

## 2021-12-15 PROCEDURE — 71260 CT THORAX DX C+: CPT

## 2021-12-15 PROCEDURE — 84100 ASSAY OF PHOSPHORUS: CPT

## 2021-12-15 RX ORDER — SODIUM CHLORIDE 0.9 % (FLUSH) 0.9 %
10 SYRINGE (ML) INJECTION
Status: COMPLETED | OUTPATIENT
Start: 2021-12-15 | End: 2021-12-15

## 2021-12-15 RX ADMIN — SODIUM CHLORIDE 100 ML: 900 INJECTION, SOLUTION INTRAVENOUS at 13:30

## 2021-12-15 RX ADMIN — ENOXAPARIN SODIUM 30 MG: 100 INJECTION SUBCUTANEOUS at 09:04

## 2021-12-15 RX ADMIN — Medication 10 ML: at 21:14

## 2021-12-15 RX ADMIN — BARICITINIB 4 MG: 2 TABLET, FILM COATED ORAL at 09:05

## 2021-12-15 RX ADMIN — Medication 10 ML: at 17:25

## 2021-12-15 RX ADMIN — IOPAMIDOL 100 ML: 755 INJECTION, SOLUTION INTRAVENOUS at 13:30

## 2021-12-15 RX ADMIN — DEXAMETHASONE 6 MG: 4 TABLET ORAL at 09:05

## 2021-12-15 RX ADMIN — ATENOLOL 50 MG: 50 TABLET ORAL at 09:05

## 2021-12-15 RX ADMIN — Medication 10 ML: at 05:04

## 2021-12-15 RX ADMIN — ENOXAPARIN SODIUM 30 MG: 100 INJECTION SUBCUTANEOUS at 21:14

## 2021-12-15 RX ADMIN — DULOXETINE HYDROCHLORIDE 30 MG: 30 CAPSULE, DELAYED RELEASE ORAL at 09:05

## 2021-12-15 RX ADMIN — Medication 10 ML: at 13:30

## 2021-12-15 NOTE — PROGRESS NOTES
Physician Progress Note      PATIENT:               Romayne Brands  CSN #:                  [id number]  :                       1965  ADMIT DATE:       2021 10:33 PM  [address] DATE:  Raffaele Rivas  PROVIDER #:        Jenny Madden MD          QUERY TEXT:    Pt admitted with COVID-19. Pt noted to have sepsis. If possible, please document in progress notes and discharge summary the present on admission status of sepsis:    The medical record reflects the following:  Risk Factors: COVID-19, pneumonia, acute respiratory failure  Clinical Indicators: Sepsis dx as of 12/15/21 \"Tachycardia + fever + leukocytosis + infiltrate on CXR.  Sepsis resolved\"  Treatment: CXR, IVF, Tylenol  Options provided:  -- Yes, sepsis was present at the time of the order to admit to the hospital  -- No, sepsis was not present on admission and developed during the inpatient stay  -- Other - I will add my own diagnosis  -- Disagree - Not applicable / Not valid  -- Disagree - Clinically unable to determine / Unknown  -- Refer to Clinical Documentation Reviewer    PROVIDER RESPONSE TEXT:    Yes, sepsis was present at the time of the order to admit to the hospital.    Query created by: Mardeen Litten on 12/15/2021 3:43 PM      Electronically signed by:  Jenny Madden MD 12/15/2021 3:45 PM

## 2021-12-15 NOTE — PROGRESS NOTES
Hospitalist Progress Note   Admit Date:  2021 10:33 PM   Name:  Irena Ly   Age:  64 y.o. Sex:  female  :  1965   MRN:  [id number]   Room:  [facility]    Presenting Complaint: Positive For Covid-19    Reason(s) for Admission: Pneumonia due to COVID-19 virus [U07.1, [facility] Course & Interval History:   Mrs. Joey Ortiz is a nice 65 y/o WF with a h/o HTN, fibromyalgia, psoriatic arthrisis who was admitted to our service on  with acute hypoxemic respiratory failure 2/2 COVID-19. Was previously on sulfasalazine for PA but has been off this for a while. She was initially on a couple liters NC O2, this increased to 4L on . Subjective (12/15/21): In bed, on 4L. Very tired and fatigued. Feels about the same as yesterday. Appetite is decreased. No chest pain, N/V/D. Assessment & Plan:   # Sepsis, acute hypoxemic respiratory failure and bilateral pneumonia 2/2 COVID-19   - Tachycardia + fever + leukocytosis + infiltrate on CXR. Sepsis resolved. Stable on 4L NC o2. Con't baricitinib and dexamethasone. Wean O2 as able. Out of bed today. Possibly ambulatory ox tomorrow. # Fibromyalgia   - Cymbalta    # HTN   - Atenolol    Addendum: Called and updated roommate, Ginger Figueredo. Dispo/Discharge Planning: Likely home when able.   Diet:  ADULT DIET Regular; Low Fat/Low Chol/High Fiber/GALINA  ADULT ORAL NUTRITION SUPPLEMENT Breakfast, Lunch, Dinner; Standard High Calorie/High Protein  DVT PPx: Lovenox  Code status: Full Code    Hospital Problems as of 12/15/2021 Date Reviewed: 2021          Codes Class Noted - Resolved POA    * (Principal) Pneumonia due to COVID-19 virus ICD-10-CM: U07.1, J12.82  ICD-9-CM: 480.8, 079.89  2021 - Present Yes        Psoriatic arthritis ([facility] [address] ICD-10-CM: L40.50  ICD-9-CM: 696.0  2021 - Present Yes        Primary hypertension ICD-10-CM: I10  ICD-9-CM: 401.9  2013 - Present Yes        Fibromyalgia ICD-10-CM: M79.7  ICD-9-CM: 729.1 5/24/2013 - Present Yes        RESOLVED: Sepsis ([facility] ICD-10-CM: A41.9  ICD-9-CM: 038.9, 995.91  12/15/2021 - 12/15/2021 Yes        RESOLVED: Thrombocytopenia ([facility] ICD-10-CM: D69.6  ICD-9-CM: 287.5  12/15/2021 - 12/15/2021 Unknown        RESOLVED: Diarrhea ICD-10-CM: R19.7  ICD-9-CM: 787.91  12/15/2021 - 12/15/2021 Unknown              Objective:     Patient Vitals for the past 24 hrs:   Temp Pulse Resp BP SpO2   12/15/21 0753 98.9 °F (37.2 °C) 85 18 136/87 90 %   12/15/21 0330 98.8 °F (37.1 °C) 76 18 (!) 141/77 90 %   12/14/21 2310 99.3 °F (37.4 °C) 64 18 130/70 92 %   12/14/21 1940 98.8 °F (37.1 °C) 70 18 132/72 90 %   12/14/21 1818     92 %   12/14/21 1526 98.7 °F (37.1 °C) 82 18 134/77 92 %   12/14/21 1503     92 %   12/14/21 1156 98.1 °F (36.7 °C) 76 21 (!) 149/84 94 %     Oxygen Therapy  O2 Sat (%): 90 % (12/15/21 0753)  Pulse via Oximetry: 69 beats per minute (12/12/21 0445)  O2 Device: Nasal cannula (12/15/21 0310)  O2 Flow Rate (L/min): 4 l/min (12/15/21 0310)    Estimated body mass index is 35.51 kg/m² as calculated from the following:    Height as of this encounter: 5' 6\" (1.676 m). Weight as of this encounter: 99.8 kg (220 lb). Intake/Output Summary (Last 24 hours) at 12/15/2021 0910  Last data filed at 12/14/2021 1848  Gross per 24 hour   Intake 354 ml   Output    Net 354 ml         Physical Exam:   Blood pressure 136/87, pulse 85, temperature 98.9 °F (37.2 °C), resp. rate 18, height 5' 6\" (1.676 m), weight 99.8 kg (220 lb), last menstrual period 11/02/2014, SpO2 90 %, not currently breastfeeding. General:    Well nourished. No overt distress. Fatigued appearing. Head:  Normocephalic, atraumatic  Eyes:  Sclerae appear normal.  Pupils equally round. ENT:  Nares appear normal, no drainage. Moist oral mucosa  Neck:  No restricted ROM. Trachea midline   CV:   RRR. No m/r/g. No jugular venous distension. Lungs:   Bibasilar rales, 4L NC O2 at rest, mild SOB. No wheezing or rhonchi. Abdomen: Bowel sounds present. Soft, nontender, nondistended. Extremities: No cyanosis or clubbing. No edema  Skin:     No rashes and normal coloration. Warm and dry. Neuro:  CN II-XII grossly intact. Sensation intact. A&Ox3  Psych:  Normal mood and affect. I have reviewed ordered lab tests and independently visualized imaging below:    Recent Labs:  Recent Results (from the past 48 hour(s))   C REACTIVE PROTEIN, QT    Collection Time: 12/14/21  4:20 AM   Result Value Ref Range    C-Reactive protein 4.8 (H) 0.0 - 0.9 mg/dL   D DIMER    Collection Time: 12/14/21  4:20 AM   Result Value Ref Range    D DIMER 0.79 (H) <0.56 ug/ml(FEU)   CBC WITH AUTOMATED DIFF    Collection Time: 12/15/21  6:42 AM   Result Value Ref Range    WBC 8.2 4.3 - 11.1 K/uL    RBC 4.83 4.05 - 5.2 M/uL    HGB 13.7 11.7 - 15.4 g/dL    HCT 40.7 35.8 - 46.3 %    MCV 84.3 79.6 - 97.8 FL    MCH 28.4 26.1 - 32.9 PG    MCHC 33.7 31.4 - 35.0 g/dL    RDW 12.1 11.9 - 14.6 %    PLATELET 077 811 - 680 K/uL    MPV 10.0 9.4 - 12.3 FL    ABSOLUTE NRBC 0.00 0.0 - 0.2 K/uL    DF AUTOMATED      NEUTROPHILS 86 (H) 43 - 78 %    LYMPHOCYTES 4 (L) 13 - 44 %    MONOCYTES 9 4.0 - 12.0 %    EOSINOPHILS 0 (L) 0.5 - 7.8 %    BASOPHILS 0 0.0 - 2.0 %    IMMATURE GRANULOCYTES 1 0.0 - 5.0 %    ABS. NEUTROPHILS 7.0 1.7 - 8.2 K/UL    ABS. LYMPHOCYTES 0.4 (L) 0.5 - 4.6 K/UL    ABS. MONOCYTES 0.8 0.1 - 1.3 K/UL    ABS. EOSINOPHILS 0.0 0.0 - 0.8 K/UL    ABS. BASOPHILS 0.0 0.0 - 0.2 K/UL    ABS. IMM.  GRANS. 0.0 0.0 - 0.5 K/UL   METABOLIC PANEL, BASIC    Collection Time: 12/15/21  6:42 AM   Result Value Ref Range    Sodium 135 (L) 136 - 145 mmol/L    Potassium 3.9 3.5 - 5.1 mmol/L    Chloride 100 98 - 107 mmol/L    CO2 30 21 - 32 mmol/L    Anion gap 5 (L) 7 - 16 mmol/L    Glucose 102 (H) 65 - 100 mg/dL    BUN 16 6 - 23 MG/DL    Creatinine 0.65 0.6 - 1.0 MG/DL    GFR est AA >60 >60 ml/min/1.73m2    GFR est non-AA >60 >60 ml/min/1.73m2    Calcium 9.1 8.3 - 10.4 MG/DL   MAGNESIUM    Collection Time: 12/15/21  6:42 AM   Result Value Ref Range    Magnesium 2.4 1.8 - 2.4 mg/dL   PHOSPHORUS    Collection Time: 12/15/21  6:42 AM   Result Value Ref Range    Phosphorus 3.8 2.5 - 4.5 MG/DL       All Micro Results     Procedure Component Value Units Date/Time    CULTURE, STOOL [[id number]] Collected: 12/12/21 1640    Order Status: Completed Specimen: Stool Updated: 12/15/21 0653     Special Requests: NO SPECIAL REQUESTS        Culture result:       No Salmonella, Shigella, or Ecoli 0157 isolated. BLOOD CULTURE [[id number]] Collected: 12/11/21 2256    Order Status: Completed Specimen: Blood Updated: 12/15/21 0630     Special Requests: NO SPECIAL REQUESTS        Culture result: NO GROWTH 3 DAYS       OVA & PARASITES, STOOL [[id number]]     Order Status: Canceled Specimen: Feces from Stool     COVID-19 RAPID TEST [[id number]]  (Abnormal) Collected: 12/12/21 1346    Order Status: Completed Specimen: Nasopharyngeal Updated: 12/12/21 1411     Specimen source NASAL        Comment: RAPID ONLY        COVID-19 rapid test Detected        Comment: RESULTS VERIFIED, PHONED TO AND READ BACK BY  Citlali Rocha RN ON 12/12/21 @1410, TA       The specimen is POSITIVE for SARS-CoV-2, the novel coronavirus associated with COVID-19. This test has been authorized by the FDA under an Emergency Use Authorization (EUA) for use by authorized laboratories. Fact sheet for Healthcare Providers: ConventionUpdate.co.nz  Fact sheet for Patients: ConventionUpdate.co.nz       Methodology: Isothermal Nucleic Acid Amplification         BLOOD CULTURE [[id number]]     Order Status: Canceled Specimen: Blood           Other Studies:  No results found.     Current Meds:  Current Facility-Administered Medications   Medication Dose Route Frequency    dexAMETHasone (DECADRON) tablet 6 mg  6 mg Oral DAILY    DULoxetine (CYMBALTA) capsule 30 mg  30 mg Oral DAILY    atenoloL (TENORMIN) tablet 50 mg  50 mg Oral DAILY    traMADoL (ULTRAM) tablet 50 mg  50 mg Oral Q6H PRN    sodium chloride (NS) flush 5-40 mL  5-40 mL IntraVENous Q8H    sodium chloride (NS) flush 5-40 mL  5-40 mL IntraVENous PRN    acetaminophen (TYLENOL) tablet 650 mg  650 mg Oral Q6H PRN    Or    acetaminophen (TYLENOL) suppository 650 mg  650 mg Rectal Q6H PRN    polyethylene glycol (MIRALAX) packet 17 g  17 g Oral DAILY PRN    ondansetron (ZOFRAN ODT) tablet 4 mg  4 mg Oral Q8H PRN    Or    ondansetron (ZOFRAN) injection 4 mg  4 mg IntraVENous Q6H PRN    guaiFENesin-dextromethorphan (ROBITUSSIN DM) 100-10 mg/5 mL syrup 5 mL  5 mL Oral Q4H PRN    benzonatate (TESSALON) capsule 100 mg  100 mg Oral TID PRN    baricitinib (OLUMIANT) tablet 4 mg  4 mg Oral DAILY    enoxaparin (LOVENOX) injection 30 mg  30 mg SubCUTAneous Q12H    diclofenac (VOLTAREN) 1 % topical gel 1-2 g  1-2 g Topical TID PRN    sodium chloride (NS) flush 5-10 mL  5-10 mL IntraVENous Q8H    sodium chloride (NS) flush 5-10 mL  5-10 mL IntraVENous PRN       Signed:  Park Sahni MD    Part of this note may have been written by using a voice dictation software. The note has been proof read but may still contain some grammatical/other typographical errors.

## 2021-12-15 NOTE — PROGRESS NOTES
Per PT, pt was on 4l before starting therapy. Had d-sat to 79%. Up in chair and recovered with 9l O2 via NC to keep sat above 90%. Perfect serve message to Dr. Tiffany Abbott. RT also updated. Will monitor closely.

## 2021-12-15 NOTE — PROGRESS NOTES
Bedside report received from night nurse Lynn Benito. Assessment done as noted  Respiration even and unlabored 20/min; denies pain or nausea at present. Afebrile this morning. Non-productive coughing at interval. O2 intact with 4 liters via nasal canula with O2 sats 93% at rest. Remains on Droplet plus isolation for positive COVID-19 screening. Ordered PPE in place and in use. Encouraged to call with needs.

## 2021-12-15 NOTE — PROGRESS NOTES
ACUTE PHYSICAL THERAPY GOALS:  (Developed with and agreed upon by patient and/or caregiver.)  (1.)Ms. Sonia Virk will move from supine to sit and sit to supine , scoot up and down and roll side to side in bed with MODIFIED INDEPENDENCE within 7 treatment day(s). goal met  21  (2.)Ms. Sonia Virk will transfer from bed to chair and chair to bed with MODIFIED INDEPENDENCE using the least restrictive device within 7 treatment day(s). (3.)Ms. Sonia Virk will ambulate with MODIFIED INDEPENDENCE for 100 feet with the least restrictive device within 7 treatment day(s). (4.)Ms. Sonia Virk will participate in therapeutic activity/exercises x 24 minutes for increased activity tolerance with SpO2 above 90% within 7 treatment days. PHYSICAL THERAPY: Daily Note and AM Treatment Day # 4    Gurdeep Coles is a 64 y.o. female   PRIMARY DIAGNOSIS: Pneumonia due to COVID-19 virus  Pneumonia due to COVID-19 virus [U07.1, J12.82]         ASSESSMENT:     REHAB RECOMMENDATIONS: CURRENT LEVEL OF FUNCTION:  (Most Recently Demonstrated)   Recommendation to date pending progress:  [address]ett[address] [place] [address] Therapy  Equipment:    To Be Determined Bed Mobility:   Supervision  Sit to Stand:   Contact Guard Assistance  Transfers:   Contact Guard Assistance  Gait/Mobility:   Minimal Assistance     ASSESSMENT:  Ms. Sonia Virk is a 64year old female admitted with pneumonia secondary to [employer] 19 virus. Patient is supine in bed and agreeable to therapy. Bed mobility is with supervision with additional time for task completion. Scooting and sitting edge of bed. Sit to stand and assisted to the bathroom and afterwards transfer to the recliner with hand held assist.  Once rested the patient participated in therapeutic exercises. Tolerated well. O2 increased to 9Lpm during treatment session as the patient saturations trending lower 80's. RN made aware. More rest breaks required to allow her saturations to recover.   Patient is left sitting in the recliner with needs within reach. Goals remain appropriate and progressing. Continue with stated plan of care.   Home with HHPT     SUBJECTIVE:   Ms. Ramy Ballard states, \"Good morning\"    SOCIAL HISTORY/ LIVING ENVIRONMENT: Lives with room mates and prior to getting sick pt was   independent with ambulation; recently getting help from room mates for ambulating  Home Environment: Private residence  One/Two Story Residence: One story  Living Alone: No  Support Systems: Friend/Neighbor  OBJECTIVE:     PAIN: VITAL SIGNS: LINES/DRAINS:   Pre Treatment:    Post Treatment: 0/10 Increased O2 to 3Lpm during the treatment session RN notified none  O2 Device: Nasal cannula HFNC     MOBILITY: I Mod I S SBA CGA Min Mod Max Total  NT x2 Comments:   Bed Mobility    Rolling [] [] [x] [] [] [] [] [] [] [] []    Supine to Sit [] [] [x] [] [] [] [] [] [] [] []    Scooting [] [] [x] [] [] [] [] [] [] [] []    Sit to Supine [] [] [] [] [] [] [] [] [] [x] []    Transfers    Sit to Stand [] [] [] [] [x] [] [] [] [] [] []    Bed to Chair [] [] [] [] [x] [] [] [] [] [] []    Stand to Sit [] [] [] [] [x] [] [] [] [] [] []    I=Independent, Mod I=Modified Independent, S=Supervision, SBA=Standby Assistance, CGA=Contact Guard Assistance,   Min=Minimal Assistance, Mod=Moderate Assistance, Max=Maximal Assistance, Total=Total Assistance, NT=Not Tested    BALANCE: Good Fair+ Fair Fair- Poor NT Comments   Sitting Static [x] [] [] [] [] []    Sitting Dynamic [x] [] [] [] [] []              Standing Static [] [x] [] [] [] []    Standing Dynamic [] [x] [] [] [] []      GAIT: I Mod I S SBA CGA Min Mod Max Total  NT x2 Comments:   Level of Assistance [] [] [] [] [] [x] [] [] [] [] [] x1-2   Distance 20ft    DME  handhold    Gait Quality Slow cadance, decreased step length bilaterally, fair dynamic balance, functional weakness    Weightbearing  Status N/A     I=Independent, Mod I=Modified Independent, S=Supervision, SBA=Standby Assistance, CGA=Contact Guard Assistance,   Min=Minimal Assistance, Mod=Moderate Assistance, Max=Maximal Assistance, Total=Total Assistance, NT=Not Tested    PLAN:   FREQUENCY/DURATION: PT Plan of Care: 3 times/week for duration of hospital stay or until stated goals are met, whichever comes first.  TREATMENT:     TREATMENT:   ($$ Therapeutic Activity: 23-37 mins    )  Therapeutic Activity (28 Minutes): Therapeutic activity included Rolling, Supine to Sit, Scooting, Transfer Training, Ambulation on level ground, Sitting balance , Standing balance and LE exercises to improve functional Mobility, Strength and Activity tolerance.       TREATMENT GRID:  N/A    AFTER TREATMENT POSITION/PRECAUTIONS:  Chair, Needs within reach and RN notified    INTERDISCIPLINARY COLLABORATION:  RN/PCT and PT/PTA    TOTAL TREATMENT DURATION:  PT Patient Time In/Time Out  Time In: 0900  Time Out: 0928    Kendall Christianson PTA

## 2021-12-15 NOTE — PROGRESS NOTES
Patient in bed resting. Respirations even and unlabored. On 4L NC. No needs expressed. Safety measures in place. Call light in reach. No signs of acute distress at this time. Will continue to monitor. Preparing to give report to oncoming RN.

## 2021-12-15 NOTE — PROGRESS NOTES
2nd line left AC #20 ga started for CT per protocol. Assisted back to bed. CT made aware. Awaiting transport. Will monitor closely.

## 2021-12-15 NOTE — PROGRESS NOTES
MSN, CM:  Patient currently on 4L NC this AM.  Patient very tired and fatigued today. CT ordered for today. PT recommending [facility] upon discharge. Case Management will continue to follow.

## 2021-12-16 PROCEDURE — 97530 THERAPEUTIC ACTIVITIES: CPT

## 2021-12-16 PROCEDURE — 74011250636 HC RX REV CODE- 250/636: Performed by: STUDENT IN AN ORGANIZED HEALTH CARE EDUCATION/TRAINING PROGRAM

## 2021-12-16 PROCEDURE — 65270000029 HC RM PRIVATE

## 2021-12-16 PROCEDURE — 74011250637 HC RX REV CODE- 250/637: Performed by: STUDENT IN AN ORGANIZED HEALTH CARE EDUCATION/TRAINING PROGRAM

## 2021-12-16 PROCEDURE — 74011250637 HC RX REV CODE- 250/637: Performed by: EMERGENCY MEDICINE

## 2021-12-16 RX ADMIN — Medication 10 ML: at 22:00

## 2021-12-16 RX ADMIN — Medication 10 ML: at 05:55

## 2021-12-16 RX ADMIN — DULOXETINE HYDROCHLORIDE 30 MG: 30 CAPSULE, DELAYED RELEASE ORAL at 08:33

## 2021-12-16 RX ADMIN — ENOXAPARIN SODIUM 30 MG: 100 INJECTION SUBCUTANEOUS at 08:33

## 2021-12-16 RX ADMIN — ENOXAPARIN SODIUM 30 MG: 100 INJECTION SUBCUTANEOUS at 20:34

## 2021-12-16 RX ADMIN — ATENOLOL 50 MG: 50 TABLET ORAL at 08:33

## 2021-12-16 RX ADMIN — BARICITINIB 4 MG: 2 TABLET, FILM COATED ORAL at 08:32

## 2021-12-16 RX ADMIN — Medication 5 ML: at 14:07

## 2021-12-16 RX ADMIN — DEXAMETHASONE 6 MG: 4 TABLET ORAL at 08:33

## 2021-12-16 NOTE — PROGRESS NOTES
Report received from [facility]. Patient in bed resting. Respirations even and unlabored. On 10L HFNC. No needs expressed. Safety measures in place. Call light in reach. No signs of acute distress at this time. Will continue to monitor.

## 2021-12-16 NOTE — PROGRESS NOTES
Beside shift report received from [facility]  Patient lying in bed  Respirations even and unlabored on 10L HFNC  No signs of distress  No needs expressed at this time  Safety measures in place

## 2021-12-16 NOTE — PROGRESS NOTES
ACUTE OT GOALS:  (Developed with and agreed upon by patient and/or caregiver.)  1. Patient will complete lower body bathing and dressing with Mod I and adaptive equipment as needed. 2. Patient will complete toileting with Mod I and adaptive equipment as needed. 3. Patient will tolerate 23 minutes of OT treatment with 1-2 rest breaks to increase activity tolerance for ADLs. 4. Patient will complete functional transfers with Mod I and adaptive equipment as needed. 5. Patient will complete standing grooming ADL with Mod I and seated rest breaks as needed. 6. Patient will verbalize and demonstrate at least 3 energy conservation techniques to increase activity tolerance for ADLs.    Timeframe: 7 visits     OCCUPATIONAL THERAPY: Daily Note and AM OT Treatment Day # 3    Agusto Ramirez is a 64 y.o. female   PRIMARY DIAGNOSIS: Pneumonia due to COVID-19 virus  Pneumonia due to COVID-19 virus [U07.1, J12.82]       Payor: BLUE CROSS / Plan: [place] BLUE [employer] [facility] / Product Type: PPO /   ASSESSMENT:     REHAB RECOMMENDATIONS: CURRENT LEVEL OF FUNCTION:  (Most Recently Demonstrated)   Recommendation to date pending progress:  [address] [place] [address] pending improvement. Equipment:    To Be Determined. Pt has SC. May need RW to increase independence for functional mobility; will continue to assess. Bathing:   Not tested  Dressing:   Not tested   Feeding/Grooming:   Not tested   Toiletin [address] transfers. Functional Mobility:   Contact Guard Assistance x RW     ASSESSMENT:  Ms. Christo Scott continues to present with deficits in overall strength, balance, and activity tolerance for performance of ADLs and functional mobility, with no progress made towards acute care OT goals this treatment session due to increased lethargy and decreased activity tolerance. Received on 10L O2 with O2 sats 92% at rest. Requires Supervision and additional time to complete supine <> sit. Desaturates to 86% seated EOB. Reminded/cued to use pursed-lip breathing technique to improve and able to improve to 91% with additional time. Seated EOB, pt presents with increased lethargy and reports increased shakiness with pt observed to have tremulous UE's when reaching for cup. Worked on activity tolerance seated EOB with minimal cueing to hold head upright to improve seated posture and intermittent cueing to use pursed-lip breathing due to minimal desaturation to 88%. Requires CGA x RW to complete sit <> stand and CGA x RW to transfer from bed <> recliner chair. Desaturates to 80%. Pt cued to use pursed-lip breathing but unable to improve O2 sats > than 83%. Requires increase to 12L O2 with O2 sats improved to 93%. Weaned back down to 10L O2 with O2 sats 91% at rest. Pt would benefit from continued skilled OT to increase independence and safety for performance of ADLs and functional mobility. SUBJECTIVE:   Ms. Akash De Oliveira states, \"I'm sorry. \" In regards to taking additional time to transfer to chair. .    SOCIAL HISTORY/LIVING ENVIRONMENT: Lives with two roommates who work during the day in one story home with a couple of JUAN. Independent for ADLs and functional mobility. Working in manual labor position. Pt niece has brought a SC to her home.    Home Environment: Private residence  One/Two Story Residence: One story  Living Alone: No  Support Systems: Friend/Neighbor    OBJECTIVE:     PAIN: VITAL SIGNS: LINES/DRAINS:   Pre Treatment: Pain Screen  Pain Scale 1: Numeric (0 - 10)  Pain Intensity 1: 0  Post Treatment: Unchanged Vital Signs  O2 Sat (%): 91 %  O2 Device: Nasal cannula  O2 Flow Rate (L/min): 10 l/min None  O2 Device: Nasal cannula     ACTIVITIES OF DAILY LIVING: I Mod I S SBA CGA Min Mod Max Total NT Comments   BASIC ADLs:              Bathing/ Showering [] [] [] [] [] [] [] [] [] [x]    Toileting [] [] [] [] [] [] [] [] [] [x]     Dressing [] [] [] [] [] [] [] [] [] [x]    Feeding [] [] [] [] [] [] [] [] [] [x]    Grooming [] [] [] [] [] [] [] [] [] [x]    Personal Device Care [] [] [] [] [] [] [] [] [] [x]    Functional Mobility [] [] [] [] [x] [] [] [] [] [] X RW   I=Independent, Mod I=Modified Independent, S=Supervision, SBA=Standby Assistance, CGA=Contact Guard Assistance,   Min=Minimal Assistance, Mod=Moderate Assistance, Max=Maximal Assistance, Total=Total Assistance, NT=Not Tested    MOBILITY: I Mod I S SBA CGA Min Mod Max Total  NT x2 Comments:   Supine to sit [] [] [x] [] [] [] [] [] [] [] []    Sit to supine [] [] [x] [] [] [] [] [] [] [] []    Sit to stand [] [] [] [] [x] [] [] [] [] [] [] X RW   Bed to chair [] [] [] [] [x] [] [] [] [] [] [] X RW   I=Independent, Mod I=Modified Independent, S=Supervision, SBA=Standby Assistance, CGA=Contact Guard Assistance,   Min=Minimal Assistance, Mod=Moderate Assistance, Max=Maximal Assistance, Total=Total Assistance, NT=Not Tested    BALANCE: Good Fair+ Fair Fair- Poor NT Comments   Sitting Static [x] [] [] [] [] []    Sitting Dynamic [] [x] [] [] [] []              Standing Static [] [] [x] [] [] [] CGA x RW   Standing Dynamic [] [] [x] [] [] [] CGA x RW     PLAN:   FREQUENCY/DURATION: OT Plan of Care: 3 times/week for duration of hospital stay or until stated goals are met, whichever comes first.    TREATMENT:   TREATMENT:   ( $$ Therapeutic Activity: 23-37 mins   )  Therapeutic Activity (28 Minutes): Therapeutic activity included Supine to Sit, Sit to Supine, Scooting, Transfer Training, Ambulation on level ground, Sitting balance  and Standing balance to improve functional Mobility, Strength and Activity tolerance.     TREATMENT GRID:  N/A    AFTER TREATMENT POSITION/PRECAUTIONS:  Chair, Needs within reach and RN notified    INTERDISCIPLINARY COLLABORATION:  RN/PCT and OT/LEE    TOTAL TREATMENT DURATION:  OT Patient Time In/Time Out  Time In: 7676  Time Out: 0306 Dontae Heredia, [address] CHER Chapman/L

## 2021-12-16 NOTE — PROGRESS NOTES
Problem: Risk for Spread of Infection  Goal: Prevent transmission of infectious organism to others  Description: Prevent the transmission of infectious organisms to other patients, staff members, and visitors. Outcome: Progressing Towards Goal     Problem: Body Temperature -  Risk of, Imbalanced  Goal: Ability to maintain a body temperature within defined limits  Outcome: Progressing Towards Goal     Problem: Isolation Precautions - Risk of Spread of Infection  Goal: Prevent transmission of infectious organism to others  Outcome: Progressing Towards Goal     Problem: Falls - Risk of  Goal: *Absence of Falls  Description: Document Earma Ana Luisa Fall Risk and appropriate interventions in the flowsheet.   Outcome: Progressing Towards Goal  Note: Fall Risk Interventions:  Mobility Interventions: Patient to call before getting OOB         Medication Interventions: Evaluate medications/consider consulting pharmacy    Elimination Interventions: Call light in reach

## 2021-12-16 NOTE — PROGRESS NOTES
Hospitalist Progress Note   Admit Date:  2021 10:33 PM   Name:  Eliza Land   Age:  64 y.o. Sex:  female  :  1965   MRN:  [id number]   Room:  2/    Presenting Complaint: Positive For Covid-19    Reason(s) for Admission: Pneumonia due to COVID-19 virus [U07.1, [facility] Course & Interval History:   Mrs. Laura Lizarraga is a nice 65 y/o WF with a h/o HTN, fibromyalgia, psoriatic arthrisis who was admitted to our service on  with acute hypoxemic respiratory failure 2/2 COVID-19. Was previously on sulfasalazine for PA but has been off this for a while. She was initially on a couple liters NC O2, this increased to 4L on  and then to 10L the following day. CT chest negative for PE but does show bilateral infiltrates. Subjective (21): Still very tired. Breathing is about the same, still some cough. Remains on 10L. No chest pain or N/V/D. Assessment & Plan:   # Sepsis, acute hypoxemic respiratory failure and bilateral pneumonia 2/2 COVID-19              - Tachycardia + fever + leukocytosis + infiltrate on CXR. Sepsis resolved. - O2 needs increased from 4L to 10L on 12/15, CT chest done and neg for PE, does have bilateral infiltrates. Remains on 10L today, wean as able. Con't dex and baricitinib. Supportive care otherwise.     # Fibromyalgia              - Cymbalta     # HTN              - Atenolol    Addendum: Updated roommate, Volodymyr Paget. Dispo/Discharge Planning: Home when able.   Diet:  ADULT DIET Regular; Low Fat/Low Chol/High Fiber/GALINA  ADULT ORAL NUTRITION SUPPLEMENT Breakfast, Lunch, Dinner; Standard High Calorie/High Protein  DVT PPx: Lovenox  Code status: Full Code    Hospital Problems as of 2021 Date Reviewed: 2021          Codes Class Noted - Resolved POA    * (Principal) Pneumonia due to COVID-19 virus ICD-10-CM: U07.1, J12.82  ICD-9-CM: 480.8, 079.89  2021 - Present Yes        Psoriatic arthritis ([facility] [address] ICD-10-CM: L40.50  ICD-9-CM: 696.0 12/12/2021 - Present Yes        Primary hypertension ICD-10-CM: I10  ICD-9-CM: 401.9  5/24/2013 - Present Yes        Fibromyalgia ICD-10-CM: M79.7  ICD-9-CM: 729.1  5/24/2013 - Present Yes        RESOLVED: Sepsis ([facility] ICD-10-CM: A41.9  ICD-9-CM: 038.9, 995.91  12/15/2021 - 12/15/2021 Yes        RESOLVED: Thrombocytopenia ([facility] ICD-10-CM: D69.6  ICD-9-CM: 287.5  12/15/2021 - 12/15/2021 Unknown        RESOLVED: Diarrhea ICD-10-CM: R19.7  ICD-9-CM: 787.91  12/15/2021 - 12/15/2021 Unknown              Objective:     Patient Vitals for the past 24 hrs:   Temp Pulse Resp BP SpO2   12/16/21 0405 98.1 °F (36.7 °C) 78 18 120/69 95 %   12/15/21 2241     92 %   12/15/21 2238 98.4 °F (36.9 °C) 75 18 133/68 92 %   12/15/21 1921 98.8 °F (37.1 °C) 72 18 120/72 92 %   12/15/21 1519 98.9 °F (37.2 °C) 74 18 125/75 92 %   12/15/21 1106 98.9 °F (37.2 °C) 69 18 (!) 140/76 90 %     Oxygen Therapy  O2 Sat (%): 95 % (12/16/21 0405)  Pulse via Oximetry: 69 beats per minute (12/12/21 0445)  O2 Device: Hi flow nasal cannula (12/16/21 0251)  O2 Flow Rate (L/min): 10 l/min (12/16/21 0251)    Estimated body mass index is 35.11 kg/m² as calculated from the following:    Height as of this encounter: 5' 6\" (1.676 m). Weight as of this encounter: 98.7 kg (217 lb 8 oz). Intake/Output Summary (Last 24 hours) at 12/16/2021 0825  Last data filed at 12/16/2021 0556  Gross per 24 hour   Intake    Output 350 ml   Net -350 ml         Physical Exam:   Blood pressure 120/69, pulse 78, temperature 98.1 °F (36.7 °C), resp. rate 18, height 5' 6\" (1.676 m), weight 98.7 kg (217 lb 8 oz), last menstrual period 11/02/2014, SpO2 95 %, not currently breastfeeding. General:    Well nourished. Tired. Head:  Normocephalic, atraumatic  Eyes:  Sclerae appear normal.  Pupils equally round. ENT:  Nares appear normal, no drainage. Moist oral mucosa  Neck:  No restricted ROM. Trachea midline   CV:   RRR. No m/r/g. No jugular venous distension.   Lungs: Bibasilar rales, 10L NC O2, mild conversational dyspnea. No wheezes or rhonchi. Abdomen: Bowel sounds present. Soft, nontender, nondistended. Extremities: No cyanosis or clubbing. No edema. Skin:     No rashes and normal coloration. Warm and dry. Neuro:  CN II-XII grossly intact. Sensation intact. A&Ox3  Psych:  Normal mood and affect. I have reviewed ordered lab tests and independently visualized imaging below:    Recent Labs:  Recent Results (from the past 48 hour(s))   CBC WITH AUTOMATED DIFF    Collection Time: 12/15/21  6:42 AM   Result Value Ref Range    WBC 8.2 4.3 - 11.1 K/uL    RBC 4.83 4.05 - 5.2 M/uL    HGB 13.7 11.7 - 15.4 g/dL    HCT 40.7 35.8 - 46.3 %    MCV 84.3 79.6 - 97.8 FL    MCH 28.4 26.1 - 32.9 PG    MCHC 33.7 31.4 - 35.0 g/dL    RDW 12.1 11.9 - 14.6 %    PLATELET 044 940 - 132 K/uL    MPV 10.0 9.4 - 12.3 FL    ABSOLUTE NRBC 0.00 0.0 - 0.2 K/uL    DF AUTOMATED      NEUTROPHILS 86 (H) 43 - 78 %    LYMPHOCYTES 4 (L) 13 - 44 %    MONOCYTES 9 4.0 - 12.0 %    EOSINOPHILS 0 (L) 0.5 - 7.8 %    BASOPHILS 0 0.0 - 2.0 %    IMMATURE GRANULOCYTES 1 0.0 - 5.0 %    ABS. NEUTROPHILS 7.0 1.7 - 8.2 K/UL    ABS. LYMPHOCYTES 0.4 (L) 0.5 - 4.6 K/UL    ABS. MONOCYTES 0.8 0.1 - 1.3 K/UL    ABS. EOSINOPHILS 0.0 0.0 - 0.8 K/UL    ABS. BASOPHILS 0.0 0.0 - 0.2 K/UL    ABS. IMM.  GRANS. 0.0 0.0 - 0.5 K/UL   METABOLIC PANEL, BASIC    Collection Time: 12/15/21  6:42 AM   Result Value Ref Range    Sodium 135 (L) 136 - 145 mmol/L    Potassium 3.9 3.5 - 5.1 mmol/L    Chloride 100 98 - 107 mmol/L    CO2 30 21 - 32 mmol/L    Anion gap 5 (L) 7 - 16 mmol/L    Glucose 102 (H) 65 - 100 mg/dL    BUN 16 6 - 23 MG/DL    Creatinine 0.65 0.6 - 1.0 MG/DL    GFR est AA >60 >60 ml/min/1.73m2    GFR est non-AA >60 >60 ml/min/1.73m2    Calcium 9.1 8.3 - 10.4 MG/DL   MAGNESIUM    Collection Time: 12/15/21  6:42 AM   Result Value Ref Range    Magnesium 2.4 1.8 - 2.4 mg/dL   PHOSPHORUS    Collection Time: 12/15/21  6:42 AM Result Value Ref Range    Phosphorus 3.8 2.5 - 4.5 MG/DL       All Micro Results     Procedure Component Value Units Date/Time    BLOOD CULTURE [[id number]] Collected: 12/11/21 2256    Order Status: Completed Specimen: Blood Updated: 12/16/21 0617     Special Requests: NO SPECIAL REQUESTS        Culture result: NO GROWTH 4 DAYS       CULTURE, STOOL [[id number]] Collected: 12/12/21 1640    Order Status: Completed Specimen: Stool Updated: 12/15/21 0653     Special Requests: NO SPECIAL REQUESTS        Culture result:       No Salmonella, Shigella, or Ecoli 0157 isolated. OVA & PARASITES, STOOL [[id number]]     Order Status: Canceled Specimen: Feces from Stool     COVID-19 RAPID TEST [[id number]]  (Abnormal) Collected: 12/12/21 1346    Order Status: Completed Specimen: Nasopharyngeal Updated: 12/12/21 1411     Specimen source NASAL        Comment: RAPID ONLY        COVID-19 rapid test Detected        Comment: RESULTS VERIFIED, PHONED TO AND READ BACK BY  Luisa Lopez RN ON 12/12/21 @1410, TA       The specimen is POSITIVE for SARS-CoV-2, the novel coronavirus associated with COVID-19. This test has been authorized by the FDA under an Emergency Use Authorization (EUA) for use by authorized laboratories. Fact sheet for Healthcare Providers: ConventionUpdate.co.nz  Fact sheet for Patients: ConventionUpdate.co.nz       Methodology: Isothermal Nucleic Acid Amplification         BLOOD CULTURE [[id number]]     Order Status: Canceled Specimen: Blood           Other Studies:  CT CHEST PULMONARY EMBOLISM    Result Date: 12/15/2021  CT OF THE CHEST WITH INTRAVENOUS CONTRAST. INDICATION: COVID positive respiratory failure with worsening shortness of breath. COMPARISON: None. TECHNIQUE:   2.5 mm axial scans from above the aortic arch to the lung bases with 100 cc nonionic intravenous contrast without acute complication.  Intravenous contrast was given to evaluate for pulmonary embolism. Radiation dose reduction techniques were used for this study. Our CT scanners use one or more of the following:  Automated exposure control, adjustment of the mA and or kV according to patient size, iterative reconstruction. FINDINGS:  PULMONARY ARTERY OPACIFICATION: Adequate. NO intraluminal filling defects within the pulmonary arterial tree to suggest pulmonary embolism. LUNGS: Diffuse bilateral groundglass opacities. AIRWAYS: Trachea and proximal bronchi grossly patent. PLEURA: No effusion or thickening or calcifications. LYMPH NODES: Mildly prominent prevascular lymph nodes. Heddy  HEART: Normal size. CORONARIES: Mild  calcifications. AORTA is normal caliber with a uniform lumen without evidence of dissection. UPPER ABDOMEN: Small exophytic cyst posterior right upper kidney. Cholecystectomy clips. SKELETAL/CHEST WALL: DJD, otherwise no gross bony lesions. Diffuse bilateral groundglass opacities. Negative for pulmonary embolism.        Current Meds:  Current Facility-Administered Medications   Medication Dose Route Frequency    dexAMETHasone (DECADRON) tablet 6 mg  6 mg Oral DAILY    DULoxetine (CYMBALTA) capsule 30 mg  30 mg Oral DAILY    atenoloL (TENORMIN) tablet 50 mg  50 mg Oral DAILY    traMADoL (ULTRAM) tablet 50 mg  50 mg Oral Q6H PRN    sodium chloride (NS) flush 5-40 mL  5-40 mL IntraVENous Q8H    sodium chloride (NS) flush 5-40 mL  5-40 mL IntraVENous PRN    acetaminophen (TYLENOL) tablet 650 mg  650 mg Oral Q6H PRN    Or    acetaminophen (TYLENOL) suppository 650 mg  650 mg Rectal Q6H PRN    polyethylene glycol (MIRALAX) packet 17 g  17 g Oral DAILY PRN    ondansetron (ZOFRAN ODT) tablet 4 mg  4 mg Oral Q8H PRN    Or    ondansetron (ZOFRAN) injection 4 mg  4 mg IntraVENous Q6H PRN    guaiFENesin-dextromethorphan (ROBITUSSIN DM) 100-10 mg/5 mL syrup 5 mL  5 mL Oral Q4H PRN    benzonatate (TESSALON) capsule 100 mg  100 mg Oral TID PRN    baricitinib (OLUMIANT) tablet 4 mg  4 mg Oral DAILY    enoxaparin (LOVENOX) injection 30 mg  30 mg SubCUTAneous Q12H    diclofenac (VOLTAREN) 1 % topical gel 1-2 g  1-2 g Topical TID PRN    sodium chloride (NS) flush 5-10 mL  5-10 mL IntraVENous Q8H    sodium chloride (NS) flush 5-10 mL  5-10 mL IntraVENous PRN       Signed:  Aide Dejesus MD    Part of this note may have been written by using a voice dictation software. The note has been proof read but may still contain some grammatical/other typographical errors.

## 2021-12-16 NOTE — PROGRESS NOTES
Patient in bed resting. Respirations even and unlabored. On 10L HFNC. No needs expressed. Safety measures in place. Call light in reach. No signs of acute distress at this time. Preparing to give report to oncoming RN.

## 2021-12-17 LAB
BACTERIA SPEC CULT: NORMAL
SERVICE CMNT-IMP: NORMAL

## 2021-12-17 PROCEDURE — 74011250636 HC RX REV CODE- 250/636: Performed by: INTERNAL MEDICINE

## 2021-12-17 PROCEDURE — 74011250637 HC RX REV CODE- 250/637: Performed by: EMERGENCY MEDICINE

## 2021-12-17 PROCEDURE — 97530 THERAPEUTIC ACTIVITIES: CPT

## 2021-12-17 PROCEDURE — 77010033711 HC HIGH FLOW OXYGEN

## 2021-12-17 PROCEDURE — 65270000029 HC RM PRIVATE

## 2021-12-17 PROCEDURE — 94760 N-INVAS EAR/PLS OXIMETRY 1: CPT

## 2021-12-17 PROCEDURE — 97535 SELF CARE MNGMENT TRAINING: CPT

## 2021-12-17 PROCEDURE — 94762 N-INVAS EAR/PLS OXIMTRY CONT: CPT

## 2021-12-17 PROCEDURE — 74011250637 HC RX REV CODE- 250/637: Performed by: STUDENT IN AN ORGANIZED HEALTH CARE EDUCATION/TRAINING PROGRAM

## 2021-12-17 PROCEDURE — 74011250636 HC RX REV CODE- 250/636: Performed by: STUDENT IN AN ORGANIZED HEALTH CARE EDUCATION/TRAINING PROGRAM

## 2021-12-17 PROCEDURE — 74011250637 HC RX REV CODE- 250/637: Performed by: INTERNAL MEDICINE

## 2021-12-17 RX ORDER — FAMOTIDINE 20 MG/1
20 TABLET, FILM COATED ORAL 2 TIMES DAILY
Status: DISCONTINUED | OUTPATIENT
Start: 2021-12-17 | End: 2021-12-27 | Stop reason: HOSPADM

## 2021-12-17 RX ORDER — DEXAMETHASONE SODIUM PHOSPHATE 100 MG/10ML
10 INJECTION INTRAMUSCULAR; INTRAVENOUS EVERY 12 HOURS
Status: DISPENSED | OUTPATIENT
Start: 2021-12-17 | End: 2021-12-21

## 2021-12-17 RX ADMIN — Medication 10 ML: at 22:13

## 2021-12-17 RX ADMIN — DEXAMETHASONE SODIUM PHOSPHATE 10 MG: 10 INJECTION, SOLUTION INTRAMUSCULAR; INTRAVENOUS at 20:42

## 2021-12-17 RX ADMIN — Medication 10 ML: at 16:32

## 2021-12-17 RX ADMIN — DEXAMETHASONE SODIUM PHOSPHATE 10 MG: 10 INJECTION, SOLUTION INTRAMUSCULAR; INTRAVENOUS at 08:49

## 2021-12-17 RX ADMIN — ENOXAPARIN SODIUM 30 MG: 100 INJECTION SUBCUTANEOUS at 08:50

## 2021-12-17 RX ADMIN — FAMOTIDINE 20 MG: 20 TABLET ORAL at 16:32

## 2021-12-17 RX ADMIN — Medication 10 ML: at 05:25

## 2021-12-17 RX ADMIN — ATENOLOL 50 MG: 50 TABLET ORAL at 08:50

## 2021-12-17 RX ADMIN — BARICITINIB 4 MG: 2 TABLET, FILM COATED ORAL at 08:49

## 2021-12-17 RX ADMIN — ENOXAPARIN SODIUM 30 MG: 100 INJECTION SUBCUTANEOUS at 20:42

## 2021-12-17 RX ADMIN — DULOXETINE HYDROCHLORIDE 30 MG: 30 CAPSULE, DELAYED RELEASE ORAL at 08:49

## 2021-12-17 NOTE — PROGRESS NOTES
Problem: Risk for Spread of Infection  Goal: Prevent transmission of infectious organism to others  Description: Prevent the transmission of infectious organisms to other patients, staff members, and visitors. Outcome: Progressing Towards Goal     Problem: Airway Clearance - Ineffective  Goal: Achieve or maintain patent airway  Outcome: Progressing Towards Goal     Problem: Gas Exchange - Impaired  Goal: Absence of hypoxia  Outcome: Progressing Towards Goal  Goal: Promote optimal lung function  Outcome: Progressing Towards Goal     Problem: Breathing Pattern - Ineffective  Goal: Ability to achieve and maintain a regular respiratory rate  Outcome: Progressing Towards Goal     Problem: Isolation Precautions - Risk of Spread of Infection  Goal: Prevent transmission of infectious organism to others  Outcome: Progressing Towards Goal     Problem: Loneliness or Risk for Loneliness  Goal: Demonstrate positive use of time alone when socialization is not possible  Outcome: Progressing Towards Goal     Problem: Falls - Risk of  Goal: *Absence of Falls  Description: Document Caleb Fall Risk and appropriate interventions in the flowsheet.   Outcome: Progressing Towards Goal  Note: Fall Risk Interventions:  Mobility Interventions: Patient to call before getting OOB         Medication Interventions: Evaluate medications/consider consulting pharmacy    Elimination Interventions: Call light in reach

## 2021-12-17 NOTE — PROGRESS NOTES
MSN, CM:  Patient currently on 15L NC this AM.  Patient has recommendations for [place] when medically stable for discharge. Case Management will continue to follow.

## 2021-12-17 NOTE — PROGRESS NOTES
Comprehensive Nutrition Assessment    Type and Reason for Visit: Reassess    Nutrition Recommendations/Plan:   Meals and Snacks:  Change current diet to regular due to limited intake with cardiac restrictions. Nutrition Supplement Therapy:   Medical food supplement therapy:  Continue Ensure Enlive three times per day (this provides 350 kcal and 20 grams protein per bottle)--chocolate   Medical food supplement therapy:  Initiate Magic Cup three times per day (this provides 290 kcal and 9 grams protein per serving)--chocolate   Encouraged pt work with PDAs in order to choose foods/meals she prefers to increase kcal and protein intake.  Encouraged pt to order food/meals from meal ordering services or have family/friends provide outside food/meals in order to increase kcal and protein intake during the day. Malnutrition Assessment:  Malnutrition Status: At risk for malnutrition (specify) (poor po intake and reported wt loss PTA, poor po admission)    Nutrition Assessment:   Nutrition History: Pt states poor po intake x2 weeks PTA due to loss of appetite. She states not eating any meals or snacks and only drinking water at that time. Pt reports UBW of 225lbs on 12/5 at dr office visit. Unable to verify wt on this date per EMR. Noted wt los 224lbs at PCP on 11/9. Cultural/Yarsani/Ethnic Food Preference(s): None   Nutrition Background: Pt with PMH notable for GERD, IBS, HTN, and psoriatic arthritis. Pt admitted for PNA due to COVID-19. Daily Update:  Pt contacted via cell phone due to isolation precautions. Pt states continued poor po intake during admission stated ongoing altered taste and loss of appetite. She reports drinking 1 Ensure/day and eating fruit and jello from trays. Pt reports no foods taste better than others. Discussed changing flavor of Ensure and addition of Magic Cup. Pt states room phone does not work.  She is agreeable for cell phone number to be shared with PDA in order to have food/meal options chosen per preference. Pt also encouraged to order outside foods/meals if she is able to in order to increase kcal and protein intake. Discussed with pt regarding prolonged po intake, possibility for TF via NGT if intake does not improve. Pt verbalized understanding. Encouraged pt to eat at least 50% of meals and drink/eat 2 Ensure or Magic Cup a day. Abdominal Status (last documented): Intact,Soft abdomen with Active  bowel sounds. Last BM 12/12/21 (per flowsheet). Pertinent Medications: Decadron, Miralax PRN, Pepcid    Nutrition Related Findings:   NFPE deferred due to isolation precautions. Current Nutrition Therapies:  ADULT DIET Regular; Low Fat/Low Chol/High Fiber/GALINA  ADULT ORAL NUTRITION SUPPLEMENT Breakfast, Lunch, Dinner; Standard High Calorie/High Protein  ADULT ORAL NUTRITION SUPPLEMENT Breakfast, Lunch, Dinner; Frozen Supplement    Current Intake:   Average Meal Intake: 1-25% Average Supplement Intake: 1-25%      Anthropometric Measures:  Height: 5' 6\" (167.6 cm)  Current Body Wt: 98.1 kg (216 lb 4.3 oz) (12/17), Weight source: Not specified  BMI: 34.9, Obese class 1 (BMI 30.0-34. 9)  Admission Body Weight: 220 lb 0.3 oz (12/12; bed scale)  Ideal Body Weight (lbs) (Calculated): 130 lbs (59 kg), 169.2 %  Usual Body Wt: 102.1 kg (225 lb) (stated per pt 12/5 at MD office; unable to confirm per EMR), Percent weight change: -3.9          Edema: No data recorded   Estimated Daily Nutrient Needs:  Energy (kcal/day): 8107-5293 (Kcal/kg (25-30), Weight Used: Ideal (59.1kg))  Protein (g/day): 71-89 (1.2-1.5gm/kg) Weight Used: (Ideal (59.1kg))  Fluid (ml/day):   (1 ml/kcal)    Nutrition Diagnosis:   · Inadequate oral intake related to altered taste perception (loss of appetite) as evidenced by intake 0-25% (pt states barriers to intake)    Nutrition Interventions:   Food and/or Nutrient Delivery: Modify oral nutrition supplement,Modify current diet     Coordination of Nutrition Care: Continue to monitor while inpatient  Plan of Care discussed with Vasquez Christensen RN, IDT rounds    Goals:   Previous Goal Met: No progress toward goal(s)  Active Goal: Meet >50% estimated nutrition needs by RD follow up. Nutrition Monitoring and Evaluation:      Food/Nutrient Intake Outcomes: Food and nutrient intake,Supplement intake  Physical Signs/Symptoms Outcomes: Biochemical data,GI status,Meal time behavior    Discharge Planning:     Too soon to determine    Electronically signed by Bertha Magaña MS, LALITO, ADIS 12/17/2021 at 2:18 PM  Contact: [phone]

## 2021-12-17 NOTE — PROGRESS NOTES
No acute events overnight. Patient resting quietly in bed. Respirations present, even and unlabored on 10L HFNC. Bed low and locked, safety measures in place. No signs of distress, no needs expressed by the patient. Call light within reach, encouraged patient to call with any needs. Preparing to give report to oncoming RN.

## 2021-12-17 NOTE — PROGRESS NOTES
ACUTE OT GOALS:  (Developed with and agreed upon by patient and/or caregiver.)  1. Patient will complete lower body bathing and dressing with Mod I and adaptive equipment as needed. 2. Patient will complete toileting with Mod I and adaptive equipment as needed. 3. Patient will tolerate 23 minutes of OT treatment with 1-2 rest breaks to increase activity tolerance for ADLs. 4. Patient will complete functional transfers with Mod I and adaptive equipment as needed. 5. Patient will complete standing grooming ADL with Mod I and seated rest breaks as needed. 6. Patient will verbalize and demonstrate at least 3 energy conservation techniques to increase activity tolerance for ADLs.    Timeframe: 7 visits     OCCUPATIONAL THERAPY: Daily Note and AM OT Treatment Day # 4    Mary Franklin is a 64 y.o. female   PRIMARY DIAGNOSIS: Pneumonia due to COVID-19 virus  Pneumonia due to COVID-19 virus [U07.1, J12.82]       Payor: BLUE CROSS / Plan[place] [facility] / Product Type: PPO /   ASSESSMENT:     REHAB RECOMMENDATIONS: CURRENT LEVEL OF FUNCTION:  (Most Recently Demonstrated)   Recommendation to date pending progress:  [address] [place] [address] pending improvement. Equipment:    To Be Determined. Pt has SC. May need RW to increase independence for functional mobility; will continue to assess. Bathing:   Standby Assistance  Dressing:  Emery Bello Assistance   Feeding/Grooming:   Supervision for monitoring of O2 sats only. Toileting:   Standby Assistance  Functional Mobility:   Standby Assistance x RW     ASSESSMENT:  Ms. Chinmay Zamorano continues to present with deficits in overall strength, balance, and activity tolerance for performance of ADLs and functional mobility, with good progress towards acute care OT goals this treatment session but with increased O2 demands compared to day prior. Received on 15L O2 just having transferred to chair with RN.  Lowest O2 desaturation throughout the treatment session is to 82% when completing genital and perineal bathing in standing. The pt typically desaturates to 88% following transfers and standing ADLs with pt recovering to 95% with use of pursed-lip breathing and increased rest break. Requires SBA x RW to complete sit <> stand transfer, walk from chair <> commode, complete clothing management in standing, and complete stand <> sit on commode. Following completion, the pt requires SBA to complete wiping in seated, sit <> stand, and clothing management in standing. Requires SBA x RW to return to recliner chair and complete stand <> sit for rest break. Requires use of hand  to clean hands due to decreased activity tolerance to complete grooming ADL at sinkside. Requires Supervision for monitoring of O2 sats and cueing for rest breaks to wash face, complete UB bathing, and UB dressing in seated. Requires SBA and cueing for rest breaks to don shampoo cap, lather leave in shampoo, and towel dry hair. Requires SBA and seated rest breaks to complete sit <> stand and doff pants/undergarment in standing, as well as SBA to complete genital and perineal bathing in standing. Requires SBA to return to seated and doff LB clothing remainder of way. Requires SBA to don clean LB clothing in seated and SBA to complete sit <> stand and don LB clothing remainder of way. Requires SBA to return to seated. Pt greatest limiting factor continues to be decreased activity tolerance for performance of ADLs and functional mobility. Pt would benefit from continued skilled OT to increase independence and safety for performance of ADLs and functional mobility. SUBJECTIVE:   Ms. Ravi Adams states, Luisa Shadow you so much, I really appreciate everything that you've done for me. \" In reference to total body bathing ADL. SOCIAL HISTORY/LIVING ENVIRONMENT: Lives with two roommates who work during the day in one story home with a couple of JUAN.  Independent for ADLs and functional mobility. Working in manual labor position. Pt niece has brought a SC to her home. Home Environment: Private residence  One/Two Story Residence: One story  Living Alone: No  Support Systems: Friend/Neighbor    OBJECTIVE:     PAIN: VITAL SIGNS: LINES/DRAINS:   Pre Treatment: Pain Screen  Pain Scale 1: Numeric (0 - 10)  Pain Intensity 1: 0  Post Treatment: Unchanged Vital Signs  O2 Sat (%): 95 %  O2 Device: Hi flow nasal cannula  O2 Flow Rate (L/min): 15 l/min Continuous Pulse Oximetry at end of treatment session. O2 Device: Hi flow nasal cannula     ACTIVITIES OF DAILY LIVING: I Mod I S SBA CGA Min Mod Max Total NT Comments   BASIC ADLs:              Bathing/ Showering [] [] [] [x] [] [] [] [] [] [] And rest breaks. Toileting [] [] [] [x] [] [] [] [] [] [] And rest breaks. Dressing [] [] [] [x] [] [] [] [] [] [] And rest breaks. Feeding [] [] [] [] [] [] [] [] [] [x]    Grooming [] [] [] [x] [] [] [] [] [] [] And rest breaks. Personal Device Care [] [] [] [] [] [] [] [] [] [x]    Functional Mobility [] [] [] [x] [] [] [] [] [] [] X RW   I=Independent, Mod I=Modified Independent, S=Supervision, SBA=Standby Assistance, CGA=Contact Guard Assistance,   Min=Minimal Assistance, Mod=Moderate Assistance, Max=Maximal Assistance, Total=Total Assistance, NT=Not Tested    MOBILITY: I Mod I S SBA CGA Min Mod Max Total  NT x2 Comments:   Supine to sit [] [] [] [] [] [] [] [] [] [x] [] Received just having transferred to chair with RN. Sit to supine [] [] [] [] [] [] [] [] [] [x] []    Sit to stand [] [] [] [x] [] [] [] [] [] [] [] X RW   Bed to chair [] [] [] [x] [] [] [] [] [] [] [] Per observation with RN; SBA x RW to complete all in room and bathroom mobility remainder of session.     I=Independent, Mod I=Modified Independent, S=Supervision, SBA=Standby Assistance, CGA=Contact Guard Assistance,   Min=Minimal Assistance, Mod=Moderate Assistance, Max=Maximal Assistance, Total=Total Assistance, NT=Not Tested    BALANCE: Good Fair+ Fair Fair- Poor NT Comments   Sitting Static [x] [] [] [] [] []    Sitting Dynamic [] [x] [] [] [] []              Standing Static [] [x] [] [] [] [] SBA x RW   Standing Dynamic [] [x] [] [] [] [] SBA x RW     PLAN:   FREQUENCY/DURATION: OT Plan of Care: 3 times/week for duration of hospital stay or until stated goals are met, whichever comes first.    TREATMENT:   TREATMENT:   ($$ Self Care/Home Management: 38-52 mins    )  Self Care (52 Minutes): Self care including Upper Body Bathing, Lower Body Bathing, Toileting, Upper Body Dressing, Lower Body Dressing, Grooming and Energy Conservation Training to increase independence, decrease level of assistance required and increase activity tolerance. .    TREATMENT GRID:  N/A    AFTER TREATMENT POSITION/PRECAUTIONS:  Chair, Needs within reach and RN notified    INTERDISCIPLINARY COLLABORATION:  RN/PCT and OT/LEE    TOTAL TREATMENT DURATION:  OT Patient Time In/Time Out  Time In: 0623  Time Out: [address] Chapman, OTR/L

## 2021-12-17 NOTE — PROGRESS NOTES
[facility] [address] [facility]. Patient resting quietly in bed. Respirations present, even and unlabored on 10L HFNC. Bed low and locked, safety measures in place. No signs of distress, needs addressed. Call light within reach, encouraged patient to call with any needs. Will continue to monitor.

## 2021-12-17 NOTE — PROGRESS NOTES
Beside shift report received from [facility]  Patient lying in bed  Respirations present  O2 sat 86% on 10L HFNC  Patient titrated up to 15L HFNC  O2 sat 88%  Respiratory therapist notified  No signs of distress  No needs expressed at this time  Safety measures in place

## 2021-12-17 NOTE — PROGRESS NOTES
ACUTE PHYSICAL THERAPY GOALS:  (Developed with and agreed upon by patient and/or caregiver.)  (1.)Ms. Mil Rodrigez will move from supine to sit and sit to supine , scoot up and down and roll side to side in bed with MODIFIED INDEPENDENCE within 7 treatment day(s). goal met  21  (2.)Ms. Mli Rodrigez will transfer from bed to chair and chair to bed with MODIFIED INDEPENDENCE using the least restrictive device within 7 treatment day(s). goal met 1721  (3.)Ms. Mil Rodrigez will ambulate with MODIFIED INDEPENDENCE for 100 feet with the least restrictive device within 7 treatment day(s). (4.)Ms. Mil Rodrigez will participate in therapeutic activity/exercises x 24 minutes for increased activity tolerance with SpO2 above 90% within 7 treatment days. PHYSICAL THERAPY: Daily Note and PM Treatment Day # 5    Katja Orta is a 64 y.o. female   PRIMARY DIAGNOSIS: Pneumonia due to COVID-19 virus  Pneumonia due to COVID-19 virus [U07.1, J12.82]         ASSESSMENT:     REHAB RECOMMENDATIONS: CURRENT LEVEL OF FUNCTION:  (Most Recently Demonstrated)   Recommendation to date pending progress:  [address] [place] [address] Therapy  Equipment:    To Be Determined Bed Mobility:   Modified Independent  Sit to Stand:   Supervision  Transfers:   Supervision  Gait/Mobility:   Supervision     ASSESSMENT:  Ms. Mil Rodrigez is a 64year old female admitted with pneumonia secondary to [place] 19 virus. Patient is sitting in the recliner and only agreeable to getting back to bed. Sit to stand with supervision, appears steady on her feet even with all the lines and tubes. Patient is able to take a few steps over to the bed and is able to put herself to bed without assistance. Left with needs within reach. Goals remain appropriate and progressing. Continue with stated plan of care.   Home with HHPT     SUBJECTIVE:   Ms. Mil Rodrigez states, \"I just want to go back to the bed\"    SOCIAL HISTORY/ LIVING ENVIRONMENT: Lives with room mates and prior to getting sick pt was   independent with ambulation; recently getting help from room mates for ambulating  Home Environment: Private residence  One/Two Story Residence: One story  Living Alone: No  Support Systems: Friend/Neighbor  OBJECTIVE:     PAIN: VITAL SIGNS: LINES/DRAINS:   Pre Treatment: Pain Screen  Pain Scale 1: Numeric (0 - 10)  Pain Intensity 1: 0/10  Post Treatment: 0/10  Airvo  O2 Device: Hi flow nasal cannula      MOBILITY: I Mod I S SBA CGA Min Mod Max Total  NT x2 Comments:   Bed Mobility    Rolling [] [x] [] [] [] [] [] [] [] [] []    Supine to Sit [] [] [] [] [] [] [] [] [] [x] []    Scooting [] [x] [] [] [] [] [] [] [] [] []    Sit to Supine [] [x] [] [] [] [] [] [] [] [] []    Transfers    Sit to Stand [] [] [x] [] [] [] [] [] [] [] []    Bed to Chair [] [] [x] [] [] [] [] [] [] [] []    Stand to Sit [] [] [x] [] [] [] [] [] [] [] []    I=Independent, Mod I=Modified Independent, S=Supervision, SBA=Standby Assistance, CGA=Contact Guard Assistance,   Min=Minimal Assistance, Mod=Moderate Assistance, Max=Maximal Assistance, Total=Total Assistance, NT=Not Tested    BALANCE: Good Fair+ Fair Fair- Poor NT Comments   Sitting Static [x] [] [] [] [] []    Sitting Dynamic [x] [] [] [] [] []              Standing Static [x] [] [] [] [] []    Standing Dynamic [x] [] [] [] [] []      GAIT: I Mod I S SBA CGA Min Mod Max Total  NT x2 Comments:   Level of Assistance [] [] [x] [] [] [] [] [] [] [] [] x1-2   Distance 5 ft    DME None    Gait Quality n/a    Weightbearing  Status N/A     I=Independent, Mod I=Modified Independent, S=Supervision, SBA=Standby Assistance, CGA=Contact Guard Assistance,   Min=Minimal Assistance, Mod=Moderate Assistance, Max=Maximal Assistance, Total=Total Assistance, NT=Not Tested    PLAN:   FREQUENCY/DURATION: PT Plan of Care: 3 times/week for duration of hospital stay or until stated goals are met, whichever comes first.  TREATMENT:     TREATMENT:   ($$ Therapeutic Activity: 8-22 mins    )  Therapeutic Activity (9 Minutes): Therapeutic activity included Rolling, Sit to Supine, Scooting, Transfer Training, Ambulation on level ground, Sitting balance  and Standing balance to improve functional Mobility, Strength and Activity tolerance.       TREATMENT GRID:  N/A    AFTER TREATMENT POSITION/PRECAUTIONS:  Bed, Needs within reach and RN notified    INTERDISCIPLINARY COLLABORATION:  RN/PCT and PT/PTA    TOTAL TREATMENT DURATION:  PT Patient Time In/Time Out  Time In: 7831  Time Out: Charline Strong PTA

## 2021-12-17 NOTE — PROGRESS NOTES
Problem: Risk for Spread of Infection  Goal: Prevent transmission of infectious organism to others  Description: Prevent the transmission of infectious organisms to other patients, staff members, and visitors. Outcome: Progressing Towards Goal     Problem: Gas Exchange - Impaired  Goal: Absence of hypoxia  Outcome: Progressing Towards Goal     Problem: Body Temperature -  Risk of, Imbalanced  Goal: Ability to maintain a body temperature within defined limits  Outcome: Progressing Towards Goal     Problem: Nutrition Deficits  Goal: Optimize nutrtional status  Outcome: Progressing Towards Goal     Problem: Falls - Risk of  Goal: *Absence of Falls  Description: Document Rosalia Mattson Fall Risk and appropriate interventions in the flowsheet.   Outcome: Progressing Towards Goal  Note: Fall Risk Interventions:  Mobility Interventions: Patient to call before getting OOB         Medication Interventions: Patient to call before getting OOB    Elimination Interventions: Call light in reach

## 2021-12-17 NOTE — PROGRESS NOTES
Hospitalist Progress Note   Admit Date:  2021 10:33 PM   Name:  Linda Goldstein   Age:  64 y.o. Sex:  female  :  1965   MRN:  [id number]   Room:  [facility]/    Presenting Complaint: Positive For Covid-19    Reason(s) for Admission: Pneumonia due to COVID-19 virus [U07.1, [place] [facility] Course & Interval History:   Mrs. Odalys Jett is a nice 65 y/o WF with a h/o HTN, fibromyalgia, psoriatic arthrisis who was admitted to our service on  with acute hypoxemic respiratory failure 2/2 COVID-19. Was previously on sulfasalazine for PA but has been off this for a while. She was initially on a couple liters NC O2, this increased to 4L on  and then to 10L the following day. CT chest negative for PE but does show bilateral infiltrates. Subjective (21):  Remains very fatigued, sleeping a lot. Apparently wa sin the chair yesterday for about 30min then wanted to get back in bed. I emphasized the need to be more consistently active, even if minimal activity. O2 increased to 15L today. Afebrile. No N/V/D, chest pain. Assessment & Plan:   # Sepsis, acute hypoxemic respiratory failure and bilateral pneumonia 2/2 COVID-19              - Tachycardia + fever + leukocytosis + infiltrate on CXR. Sepsis now resolved. - Increased to 15L NC O2. Wean as able. CT chest with b/l infiltrates, neg for PE or other acute process.     # Fibromyalgia              - Cymbalta     # HTN              - Atenolol    Dispo/Discharge Planning: Home when able.   Diet:  ADULT DIET Regular; Low Fat/Low Chol/High Fiber/GALINA  ADULT ORAL NUTRITION SUPPLEMENT Breakfast, Lunch, Dinner; Standard High Calorie/High Protein  DVT PPx: Lovenox  Code status: Full Code    Hospital Problems as of 2021 Date Reviewed: 2021          Codes Class Noted - Resolved POA    * (Principal) Pneumonia due to COVID-19 virus ICD-10-CM: U07.1, J12.82  ICD-9-CM: 480.8, 079.89  2021 - Present Yes        Psoriatic arthritis [facility] ICD-10-CM: L40.50  ICD-9-CM: 696.0  12/12/2021 - Present Yes        Primary hypertension ICD-10-CM: I10  ICD-9-CM: 401.9  5/24/2013 - Present Yes        Fibromyalgia ICD-10-CM: M79.7  ICD-9-CM: 729.1  5/24/2013 - Present Yes        RESOLVED: Sepsis ([facility] [address] ICD-10-CM: A41.9  ICD-9-CM: 038.9, 995.91  12/15/2021 - 12/15/2021 Yes        RESOLVED: Thrombocytopenia ([facility] [address] ICD-10-CM: D69.6  ICD-9-CM: 287.5  12/15/2021 - 12/15/2021 Unknown        RESOLVED: Diarrhea ICD-10-CM: R19.7  ICD-9-CM: 787.91  12/15/2021 - 12/15/2021 Unknown              Objective:     Patient Vitals for the past 24 hrs:   Temp Pulse Resp BP SpO2   12/17/21 0820     96 %   12/17/21 0754 98.3 °F (36.8 °C) 83 20 128/75 (!) 89 %   12/17/21 0410 98.3 °F (36.8 °C) 67 22 130/85 90 %   12/16/21 2232 98.3 °F (36.8 °C) 63 20 129/69 93 %   12/16/21 1847 99.3 °F (37.4 °C) 66 20 122/67 91 %   12/16/21 1240 98.2 °F (36.8 °C) 68 18 121/83 92 %     Oxygen Therapy  O2 Sat (%): 96 % (12/17/21 0820)  Pulse via Oximetry: 73 beats per minute (12/17/21 0820)  O2 Device: Hi flow nasal cannula (12/17/21 0820)  O2 Flow Rate (L/min): 15 l/min (12/17/21 0820)    Estimated body mass index is 34.9 kg/m² as calculated from the following:    Height as of this encounter: 5' 6\" (1.676 m). Weight as of this encounter: 98.1 kg (216 lb 3.2 oz). Intake/Output Summary (Last 24 hours) at 12/17/2021 1007  Last data filed at 12/17/2021 0931  Gross per 24 hour   Intake 240 ml   Output 750 ml   Net -510 ml         Physical Exam:   Blood pressure 128/75, pulse 83, temperature 98.3 °F (36.8 °C), resp. rate 20, height 5' 6\" (1.676 m), weight 98.1 kg (216 lb 3.2 oz), last menstrual period 11/02/2014, SpO2 96 %, not currently breastfeeding. General:    Well nourished. No overt distress. Tired appearing. In bed but oriented and cooperative. Head:  Normocephalic, atraumatic  Eyes:  Sclerae appear normal.  Pupils equally round. ENT:  Nares appear normal, no drainage.   Moist oral mucosa  Neck:  No restricted ROM. Trachea midline   CV:   RRR. No m/r/g. No jugular venous distension. Lungs:   BB rales, no wheezes or rhonchi appreciated. 15L NC O2. Abdomen: Bowel sounds present. Soft, nontender, nondistended. Extremities: No cyanosis or clubbing. No edema  Skin:     No rashes and normal coloration. Warm and dry. Neuro:  CN II-XII grossly intact. Sensation intact. A&Ox3  Psych:  Normal mood and affect. I have reviewed ordered lab tests and independently visualized imaging below:    Recent Labs:  No results found for this or any previous visit (from the past 48 hour(s)). All Micro Results     Procedure Component Value Units Date/Time    BLOOD CULTURE [[id number]] Collected: 12/11/21 2256    Order Status: Completed Specimen: Blood Updated: 12/17/21 0731     Special Requests: NO SPECIAL REQUESTS        Culture result: NO GROWTH 5 DAYS       CULTURE, STOOL [[id number]] Collected: 12/12/21 1640    Order Status: Completed Specimen: Stool Updated: 12/15/21 7928     Special Requests: NO SPECIAL REQUESTS        Culture result:       No Salmonella, Shigella, or Ecoli 0157 isolated. OVA & PARASITES, STOOL [[id number]]     Order Status: Canceled Specimen: Feces from Stool     COVID-19 RAPID TEST [[id number]]  (Abnormal) Collected: 12/12/21 1346    Order Status: Completed Specimen: Nasopharyngeal Updated: 12/12/21 1411     Specimen source NASAL        Comment: RAPID ONLY        COVID-19 rapid test Detected        Comment: RESULTS VERIFIED, PHONED TO AND READ BACK BY  Debbie Morfin RN ON 12/12/21 @1410, TA       The specimen is POSITIVE for SARS-CoV-2, the novel coronavirus associated with COVID-19. This test has been authorized by the FDA under an Emergency Use Authorization (EUA) for use by authorized laboratories.         Fact sheet for Healthcare Providers: ConventionUpdate.co.nz  Fact sheet for Patients: ConventionUpdate.co.nz Methodology: Isothermal Nucleic Acid Amplification         BLOOD CULTURE [[id number]]     Order Status: Canceled Specimen: Blood           Other Studies:  No results found. Current Meds:  Current Facility-Administered Medications   Medication Dose Route Frequency    dexamethasone (DECADRON) 10 mg/mL injection 10 mg  10 mg IntraVENous Q12H    DULoxetine (CYMBALTA) capsule 30 mg  30 mg Oral DAILY    atenoloL (TENORMIN) tablet 50 mg  50 mg Oral DAILY    traMADoL (ULTRAM) tablet 50 mg  50 mg Oral Q6H PRN    sodium chloride (NS) flush 5-40 mL  5-40 mL IntraVENous Q8H    sodium chloride (NS) flush 5-40 mL  5-40 mL IntraVENous PRN    acetaminophen (TYLENOL) tablet 650 mg  650 mg Oral Q6H PRN    Or    acetaminophen (TYLENOL) suppository 650 mg  650 mg Rectal Q6H PRN    polyethylene glycol (MIRALAX) packet 17 g  17 g Oral DAILY PRN    ondansetron (ZOFRAN ODT) tablet 4 mg  4 mg Oral Q8H PRN    Or    ondansetron (ZOFRAN) injection 4 mg  4 mg IntraVENous Q6H PRN    guaiFENesin-dextromethorphan (ROBITUSSIN DM) 100-10 mg/5 mL syrup 5 mL  5 mL Oral Q4H PRN    benzonatate (TESSALON) capsule 100 mg  100 mg Oral TID PRN    baricitinib (OLUMIANT) tablet 4 mg  4 mg Oral DAILY    enoxaparin (LOVENOX) injection 30 mg  30 mg SubCUTAneous Q12H    diclofenac (VOLTAREN) 1 % topical gel 1-2 g  1-2 g Topical TID PRN    sodium chloride (NS) flush 5-10 mL  5-10 mL IntraVENous Q8H    sodium chloride (NS) flush 5-10 mL  5-10 mL IntraVENous PRN       Signed:  Nitza Arroyo MD    Part of this note may have been written by using a voice dictation software. The note has been proof read but may still contain some grammatical/other typographical errors.

## 2021-12-18 PROCEDURE — 74011250637 HC RX REV CODE- 250/637: Performed by: STUDENT IN AN ORGANIZED HEALTH CARE EDUCATION/TRAINING PROGRAM

## 2021-12-18 PROCEDURE — 65270000029 HC RM PRIVATE

## 2021-12-18 PROCEDURE — 74011250637 HC RX REV CODE- 250/637: Performed by: EMERGENCY MEDICINE

## 2021-12-18 PROCEDURE — 94762 N-INVAS EAR/PLS OXIMTRY CONT: CPT

## 2021-12-18 PROCEDURE — 74011250636 HC RX REV CODE- 250/636: Performed by: INTERNAL MEDICINE

## 2021-12-18 PROCEDURE — 77010033711 HC HIGH FLOW OXYGEN

## 2021-12-18 PROCEDURE — 74011250637 HC RX REV CODE- 250/637: Performed by: INTERNAL MEDICINE

## 2021-12-18 PROCEDURE — 74011250636 HC RX REV CODE- 250/636: Performed by: STUDENT IN AN ORGANIZED HEALTH CARE EDUCATION/TRAINING PROGRAM

## 2021-12-18 RX ADMIN — DULOXETINE HYDROCHLORIDE 30 MG: 30 CAPSULE, DELAYED RELEASE ORAL at 08:41

## 2021-12-18 RX ADMIN — DEXAMETHASONE SODIUM PHOSPHATE 10 MG: 10 INJECTION, SOLUTION INTRAMUSCULAR; INTRAVENOUS at 21:15

## 2021-12-18 RX ADMIN — ENOXAPARIN SODIUM 30 MG: 100 INJECTION SUBCUTANEOUS at 08:41

## 2021-12-18 RX ADMIN — Medication 10 ML: at 21:15

## 2021-12-18 RX ADMIN — ENOXAPARIN SODIUM 30 MG: 100 INJECTION SUBCUTANEOUS at 21:15

## 2021-12-18 RX ADMIN — Medication 10 ML: at 06:27

## 2021-12-18 RX ADMIN — Medication 10 ML: at 16:09

## 2021-12-18 RX ADMIN — BARICITINIB 4 MG: 2 TABLET, FILM COATED ORAL at 08:41

## 2021-12-18 RX ADMIN — DEXAMETHASONE SODIUM PHOSPHATE 10 MG: 10 INJECTION, SOLUTION INTRAMUSCULAR; INTRAVENOUS at 08:41

## 2021-12-18 RX ADMIN — ATENOLOL 50 MG: 50 TABLET ORAL at 08:41

## 2021-12-18 RX ADMIN — Medication 10 ML: at 06:28

## 2021-12-18 RX ADMIN — FAMOTIDINE 20 MG: 20 TABLET ORAL at 08:41

## 2021-12-18 RX ADMIN — FAMOTIDINE 20 MG: 20 TABLET ORAL at 16:08

## 2021-12-18 NOTE — PROGRESS NOTES
Patient's oxygen down to 10L with sat of 94%. Patient states feeling well at this time. Will continue to monitor.

## 2021-12-18 NOTE — PROGRESS NOTES
No acute events overnight. Patient resting quietly in bed. Bed low and locked, safety measures in place. Respirations present, even and unlabored on 13L HFNC. No signs of distress, no needs expressed. Call light within reach, encouraged patient to call with any needs. Preparing to give report to oncoming RN.

## 2021-12-18 NOTE — PROGRESS NOTES
Patient stable with no complaints at this time. Patient did sit up in recliner until after dinner today. Patient now resting in bed watching TV. Call light within reach and patient instructed to call if assistance is needed.   Report to be given to oncoming RN 7p-7a

## 2021-12-18 NOTE — PROGRESS NOTES
Hospitalist Progress Note   Admit Date:  2021 10:33 PM   Name:  Daniela Wilkes   Age:  64 y.o. Sex:  female  :  1965   MRN:  [id number]   Room:  2/    Presenting Complaint: Positive For Covid-19    Reason(s) for Admission: Pneumonia due to COVID-19 virus [U07.1, [facility] Course & Interval History:   Mrs. Amalia Shepherd is a nice 63 y/o WF with a h/o HTN, fibromyalgia, psoriatic arthrisis who was admitted to our service on  with acute hypoxemic respiratory failure 2/2 COVID-19. Was previously on sulfasalazine for PA but has been off this for a while. She was initially on a couple liters NC O2, this increased to 4L on  and then to 10L the following day. CT chest negative for PE but does show bilateral infiltrates. O2 up to 15L, dexamethasone increased. Subjective (21): She looks much less fatigued today. Got up to the chair some yesterday, did get worn out and SOB with ambulation and increased activity but says she did better than she thought. She admits she feels better today too. O2 at 13L, mild cough and SOB persist, no N/V/D. Assessment & Plan:   # Sepsis, acute hypoxemic respiratory failure and bilateral pneumonia 2/2 COVID-19              - Tachycardia + fever + leukocytosis + infiltrate on CXR. Sepsis now resolved.              - NC 13L O2, con't high dose dexamethasone, baricitinib, wean O2 as able. Con't to increase activity level as able.  CT chest neg for PE, does have b/l infiltrates w/o other acute process.     # Fibromyalgia              - Cymbalta     # HTN              - Atenolol    Dispo/Discharge Planning:    Diet:  ADULT ORAL NUTRITION SUPPLEMENT Breakfast, Lunch, Dinner; Standard High Calorie/High Protein  ADULT ORAL NUTRITION SUPPLEMENT Breakfast, Lunch, Dinner; Frozen Supplement  ADULT DIET Regular  DVT PPx: Lovenox  Code status: Full Code    Hospital Problems as of 2021 Date Reviewed: 2021          Codes Class Noted - Resolved POA    * (Principal) Pneumonia due to COVID-19 virus ICD-10-CM: U07.1, J12.82  ICD-9-CM: 480.8, 079.89  12/12/2021 - Present Yes        Psoriatic arthritis ([facility] ICD-10-CM: L40.50  ICD-9-CM: 696.0  12/12/2021 - Present Yes        Primary hypertension ICD-10-CM: I10  ICD-9-CM: 401.9  5/24/2013 - Present Yes        Fibromyalgia ICD-10-CM: M79.7  ICD-9-CM: 729.1  5/24/2013 - Present Yes        RESOLVED: Sepsis ([facility].) ICD-10-CM: A41.9  ICD-9-CM: 038.9, 995.91  12/15/2021 - 12/15/2021 Yes        RESOLVED: Thrombocytopenia ([facility].) ICD-10-CM: D69.6  ICD-9-CM: 287.5  12/15/2021 - 12/15/2021 Unknown        RESOLVED: Diarrhea ICD-10-CM: R19.7  ICD-9-CM: 787.91  12/15/2021 - 12/15/2021 Unknown              Objective:     Patient Vitals for the past 24 hrs:   Temp Pulse Resp BP SpO2   12/18/21 0755 97.9 °F (36.6 °C) (!) 59 20 120/76 92 %   12/18/21 0403 97.9 °F (36.6 °C) 81 18 125/74 92 %   12/17/21 2229 97.9 °F (36.6 °C) (!) 54 18 137/70 96 %   12/17/21 2020     95 %   12/17/21 1934 98.2 °F (36.8 °C) 62 18 125/76 90 %   12/17/21 1535 97.9 °F (36.6 °C) 62 20 116/65 97 %   12/17/21 1126 97.8 °F (36.6 °C) 76 18 129/74 97 %   12/17/21 1058     95 %     Oxygen Therapy  O2 Sat (%): 92 % (12/18/21 0755)  Pulse via Oximetry: 73 beats per minute (12/17/21 0820)  O2 Device: Hi flow nasal cannula (12/18/21 0403)  O2 Flow Rate (L/min): 13 l/min (12/18/21 0403)    Estimated body mass index is 34.7 kg/m² as calculated from the following:    Height as of this encounter: 5' 6\" (1.676 m). Weight as of this encounter: 97.5 kg (215 lb). Intake/Output Summary (Last 24 hours) at 12/18/2021 0909  Last data filed at 12/18/2021 0646  Gross per 24 hour   Intake 240 ml   Output 450 ml   Net -210 ml         Physical Exam:   Blood pressure 120/76, pulse (!) 59, temperature 97.9 °F (36.6 °C), resp. rate 20, height 5' 6\" (1.676 m), weight 97.5 kg (215 lb), last menstrual period 11/02/2014, SpO2 92 %, not currently breastfeeding.   General: Well nourished. No overt distress. Looks less fatigued today. Head:  Normocephalic, atraumatic  Eyes:  Sclerae appear normal.  Pupils equally round. ENT:  Nares appear normal, no drainage. Moist oral mucosa  Neck:  No restricted ROM. Trachea midline   CV:   RRR. No m/r/g. No jugular venous distension. Lungs:   BB rales, 13L NC O2, bedside sats low 90s at rest in bed. Abdomen: Bowel sounds present. Soft, nontender, nondistended. Extremities: No cyanosis or clubbing. No edema  Skin:     No rashes and normal coloration. Warm and dry. Neuro:  CN II-XII grossly intact. Sensation intact. A&Ox3  Psych:  Normal mood and affect. I have reviewed ordered lab tests and independently visualized imaging below:    Recent Labs:  No results found for this or any previous visit (from the past 48 hour(s)). All Micro Results     Procedure Component Value Units Date/Time    BLOOD CULTURE [[id number]] Collected: 12/11/21 2256    Order Status: Completed Specimen: Blood Updated: 12/17/21 0731     Special Requests: NO SPECIAL REQUESTS        Culture result: NO GROWTH 5 DAYS       CULTURE, STOOL [[id number]] Collected: 12/12/21 1640    Order Status: Completed Specimen: Stool Updated: 12/15/21 5460     Special Requests: NO SPECIAL REQUESTS        Culture result:       No Salmonella, Shigella, or Ecoli 0157 isolated. OVA & PARASITES, STOOL [[id number]]     Order Status: Canceled Specimen: Feces from Stool     COVID-19 RAPID TEST [[id number]]  (Abnormal) Collected: 12/12/21 1346    Order Status: Completed Specimen: Nasopharyngeal Updated: 12/12/21 1411     Specimen source NASAL        Comment: RAPID ONLY        COVID-19 rapid test Detected        Comment: RESULTS VERIFIED, PHONED TO AND READ BACK BY  Geraldo Schneider RN ON 12/12/21 @1410, TA       The specimen is POSITIVE for SARS-CoV-2, the novel coronavirus associated with COVID-19.        This test has been authorized by the FDA under an Emergency Use Authorization (EUA) for use by authorized laboratories. Fact sheet for Healthcare Providers: ConventionUpdate.co.nz  Fact sheet for Patients: ConventionUpdate.co.nz       Methodology: Isothermal Nucleic Acid Amplification         BLOOD CULTURE [[id number]]     Order Status: Canceled Specimen: Blood           Other Studies:  No results found. Current Meds:  Current Facility-Administered Medications   Medication Dose Route Frequency    dexamethasone (DECADRON) 10 mg/mL injection 10 mg  10 mg IntraVENous Q12H    famotidine (PEPCID) tablet 20 mg  20 mg Oral BID    DULoxetine (CYMBALTA) capsule 30 mg  30 mg Oral DAILY    atenoloL (TENORMIN) tablet 50 mg  50 mg Oral DAILY    traMADoL (ULTRAM) tablet 50 mg  50 mg Oral Q6H PRN    sodium chloride (NS) flush 5-40 mL  5-40 mL IntraVENous Q8H    sodium chloride (NS) flush 5-40 mL  5-40 mL IntraVENous PRN    acetaminophen (TYLENOL) tablet 650 mg  650 mg Oral Q6H PRN    Or    acetaminophen (TYLENOL) suppository 650 mg  650 mg Rectal Q6H PRN    polyethylene glycol (MIRALAX) packet 17 g  17 g Oral DAILY PRN    ondansetron (ZOFRAN ODT) tablet 4 mg  4 mg Oral Q8H PRN    Or    ondansetron (ZOFRAN) injection 4 mg  4 mg IntraVENous Q6H PRN    guaiFENesin-dextromethorphan (ROBITUSSIN DM) 100-10 mg/5 mL syrup 5 mL  5 mL Oral Q4H PRN    benzonatate (TESSALON) capsule 100 mg  100 mg Oral TID PRN    baricitinib (OLUMIANT) tablet 4 mg  4 mg Oral DAILY    enoxaparin (LOVENOX) injection 30 mg  30 mg SubCUTAneous Q12H    diclofenac (VOLTAREN) 1 % topical gel 1-2 g  1-2 g Topical TID PRN    sodium chloride (NS) flush 5-10 mL  5-10 mL IntraVENous Q8H    sodium chloride (NS) flush 5-10 mL  5-10 mL IntraVENous PRN       Signed:  Anju Zimmer MD    Part of this note may have been written by using a voice dictation software. The note has been proof read but may still contain some grammatical/other typographical errors.

## 2021-12-18 NOTE — PROGRESS NOTES
Patient resting in bed with no complaints at this time. Patient is alert and orientated with no distress noted. IV intact and patent with no s/s of infection noted. Respirations even and unlabored with heart rate regular. Patient unable to ambulate independently without assistance; needs x1 r/t dyspnea and weakness. Bed in low locked position with call light within reach. Patient instructed to call if assistance is needed. Will continue to monitor.

## 2021-12-18 NOTE — PROGRESS NOTES
Report received from [facility]. Patient resting quietly in bed. Respirations present, even and unlabored on 15L HFNC. Bed low and locked, safety measures in place. No signs of distress, no needs expressed by the patient. Call light within reach, encouraged patient to call with any needs. Will continue to monitor.

## 2021-12-18 NOTE — PROGRESS NOTES
Patient up in recliner watching TV. Patient denies any pain and appears comfortable at this time. Call light within reach and patient instructed to call if assistance is needed. Will continue to follow.

## 2021-12-19 PROCEDURE — 74011250636 HC RX REV CODE- 250/636: Performed by: STUDENT IN AN ORGANIZED HEALTH CARE EDUCATION/TRAINING PROGRAM

## 2021-12-19 PROCEDURE — 74011250637 HC RX REV CODE- 250/637: Performed by: EMERGENCY MEDICINE

## 2021-12-19 PROCEDURE — 65270000029 HC RM PRIVATE

## 2021-12-19 PROCEDURE — 94762 N-INVAS EAR/PLS OXIMTRY CONT: CPT

## 2021-12-19 PROCEDURE — 74011250636 HC RX REV CODE- 250/636: Performed by: INTERNAL MEDICINE

## 2021-12-19 PROCEDURE — 77010033711 HC HIGH FLOW OXYGEN

## 2021-12-19 PROCEDURE — 74011250637 HC RX REV CODE- 250/637: Performed by: STUDENT IN AN ORGANIZED HEALTH CARE EDUCATION/TRAINING PROGRAM

## 2021-12-19 PROCEDURE — 74011250637 HC RX REV CODE- 250/637: Performed by: INTERNAL MEDICINE

## 2021-12-19 RX ADMIN — DULOXETINE HYDROCHLORIDE 30 MG: 30 CAPSULE, DELAYED RELEASE ORAL at 08:13

## 2021-12-19 RX ADMIN — Medication 10 ML: at 21:19

## 2021-12-19 RX ADMIN — BARICITINIB 4 MG: 2 TABLET, FILM COATED ORAL at 08:13

## 2021-12-19 RX ADMIN — Medication 10 ML: at 14:06

## 2021-12-19 RX ADMIN — Medication 10 ML: at 05:57

## 2021-12-19 RX ADMIN — DEXAMETHASONE SODIUM PHOSPHATE 10 MG: 10 INJECTION, SOLUTION INTRAMUSCULAR; INTRAVENOUS at 08:13

## 2021-12-19 RX ADMIN — ATENOLOL 50 MG: 50 TABLET ORAL at 08:13

## 2021-12-19 RX ADMIN — FAMOTIDINE 20 MG: 20 TABLET ORAL at 08:13

## 2021-12-19 RX ADMIN — ENOXAPARIN SODIUM 30 MG: 100 INJECTION SUBCUTANEOUS at 21:19

## 2021-12-19 RX ADMIN — DEXAMETHASONE SODIUM PHOSPHATE 10 MG: 10 INJECTION, SOLUTION INTRAMUSCULAR; INTRAVENOUS at 21:19

## 2021-12-19 RX ADMIN — FAMOTIDINE 20 MG: 20 TABLET ORAL at 17:24

## 2021-12-19 RX ADMIN — ENOXAPARIN SODIUM 30 MG: 100 INJECTION SUBCUTANEOUS at 08:13

## 2021-12-19 NOTE — PROGRESS NOTES
Patient resting quietly in bed. A&O x4. Respirations even and unlabored. On 15L HFNC with O2 sat at 91%. No signs of distress. No needs expressed. To give report to oncoming RN.

## 2021-12-19 NOTE — PROGRESS NOTES
Patient on 10L HFNC with oxygen saturation at 84%. Oxygen increased to 15L HFNC with oxygen saturation coming up to 91%. Will continue to monitor.

## 2021-12-19 NOTE — PROGRESS NOTES
Hospitalist Progress Note   Admit Date:  2021 10:33 PM   Name:  Chelle Hale   Age:  64 y.o. Sex:  female  :  1965   MRN:  [id number]   Room:  [facility]/    Presenting Complaint: Positive For Covid-19    Reason(s) for Admission: Pneumonia due to COVID-19 virus [U07.1, [facility] Course & Interval History:   Mrs. Ceci Cheema is a nice 65 y/o WF with a h/o HTN, fibromyalgia, psoriatic arthrisis who was admitted to our service on  with acute hypoxemic respiratory failure 2/2 COVID-19. Was previously on sulfasalazine for PA but has been off this for a while. She was initially on a couple liters NC O2, this increased to 4L on  and then to 10L the following day. CT chest negative for PE but does show bilateral infiltrates. O2 up to 15L, dexamethasone increased. Slowly weaning O2. Subjective (21):  Up to chair for a while yesterday, did well but exhausted afterwards. On 15L, decreased to 13L. In bed sleeping. Her appetite is slightly better this morning. No chest pain, N/V/D. Assessment & Plan:   # Sepsis, acute hypoxemic respiratory failure and bilateral pneumonia 2/2 COVID-19              - Tachycardia + fever + leukocytosis + infiltrate on CXR. Sepsis now resolved.              - Remains on 13L NC, very slow to wean O2 but needs are not worsening which is reassuring. CT chest with b/l infiltrates but no PE. Con't dex, baricitinib.      # Fibromyalgia              - Cymbalta     # HTN              - Atenolol    Dispo/Discharge Planning: Home when able pending further improvement in O2 needs.    Diet:  ADULT ORAL NUTRITION SUPPLEMENT Breakfast, Lunch, Dinner; Standard High Calorie/High Protein  ADULT ORAL NUTRITION SUPPLEMENT Breakfast, Lunch, Dinner; Frozen Supplement  ADULT DIET Regular  DVT PPx: Lovenox  Code status: Full Code    Hospital Problems as of 2021 Date Reviewed: 2021          Codes Class Noted - Resolved POA    * (Principal) Pneumonia due to COVID-19 virus ICD-10-CM: U07.1, J12.82  ICD-9-CM: 480.8, 079.89  12/12/2021 - Present Yes        Psoriatic arthritis ([facility].) ICD-10-CM: L40.50  ICD-9-CM: 696.0  12/12/2021 - Present Yes        Primary hypertension ICD-10-CM: I10  ICD-9-CM: 401.9  5/24/2013 - Present Yes        Fibromyalgia ICD-10-CM: M79.7  ICD-9-CM: 729.1  5/24/2013 - Present Yes        RESOLVED: Sepsis ([facility].) ICD-10-CM: A41.9  ICD-9-CM: 038.9, 995.91  12/15/2021 - 12/15/2021 Yes        RESOLVED: Thrombocytopenia ([facility].) ICD-10-CM: D69.6  ICD-9-CM: 287.5  12/15/2021 - 12/15/2021 Unknown        RESOLVED: Diarrhea ICD-10-CM: R19.7  ICD-9-CM: 787.91  12/15/2021 - 12/15/2021 Unknown              Objective:     Patient Vitals for the past 24 hrs:   Temp Pulse Resp BP SpO2   12/19/21 0804 97.9 °F (36.6 °C) (!) 57 18 137/66 91 %   12/19/21 0346 97.9 °F (36.6 °C) (!) 57 18 136/81    12/18/21 2231 98.2 °F (36.8 °C) 61 18 120/74 97 %   12/18/21 2013     92 %   12/18/21 1918 98.4 °F (36.9 °C) 80 20 128/78 90 %   12/18/21 1551 98 °F (36.7 °C) 62 20 (!) 125/91 97 %   12/18/21 1109 97.9 °F (36.6 °C) 65 20 128/83 91 %     Oxygen Therapy  O2 Sat (%): 91 % (12/19/21 0804)  Pulse via Oximetry: 73 beats per minute (12/17/21 0820)  O2 Device: Hi flow nasal cannula (12/19/21 0917)  O2 Flow Rate (L/min): 13 l/min (12/19/21 0917)    Estimated body mass index is 34.7 kg/m² as calculated from the following:    Height as of this encounter: 5' 6\" (1.676 m). Weight as of this encounter: 97.5 kg (215 lb). Intake/Output Summary (Last 24 hours) at 12/19/2021 0936  Last data filed at 12/19/2021 0616  Gross per 24 hour   Intake 240 ml   Output 350 ml   Net -110 ml         Physical Exam:   Blood pressure 137/66, pulse (!) 57, temperature 97.9 °F (36.6 °C), resp. rate 18, height 5' 6\" (1.676 m), weight 97.5 kg (215 lb), last menstrual period 11/02/2014, SpO2 91 %, not currently breastfeeding. General:    Well nourished.   No overt distress  Head:  Normocephalic, atraumatic  Eyes:  Sclerae appear normal.  Pupils equally round. ENT:  Nares appear normal, no drainage. Moist oral mucosa  Neck:  No restricted ROM. Trachea midline   CV:   RRR. No m/r/g. No jugular venous distension. Lungs:   BB rales, 13L NC O2. No wheezes or rhonchi. Abdomen: Bowel sounds present. Soft, nontender, nondistended. Extremities: No cyanosis or clubbing. No edema  Skin:     No rashes and normal coloration. Warm and dry. Neuro:  CN II-XII grossly intact. Sensation intact. A&Ox3  Psych:  Normal mood and affect. I have reviewed ordered lab tests and independently visualized imaging below:    Recent Labs:  No results found for this or any previous visit (from the past 48 hour(s)). All Micro Results     Procedure Component Value Units Date/Time    BLOOD CULTURE [[id number]] Collected: 12/11/21 2256    Order Status: Completed Specimen: Blood Updated: 12/17/21 0731     Special Requests: NO SPECIAL REQUESTS        Culture result: NO GROWTH 5 DAYS       CULTURE, STOOL [[id number]] Collected: 12/12/21 1640    Order Status: Completed Specimen: Stool Updated: 12/15/21 4436     Special Requests: NO SPECIAL REQUESTS        Culture result:       No Salmonella, Shigella, or Ecoli 0157 isolated. OVA & PARASITES, STOOL [[id number]]     Order Status: Canceled Specimen: Feces from Stool     COVID-19 RAPID TEST [[id number]]  (Abnormal) Collected: 12/12/21 1346    Order Status: Completed Specimen: Nasopharyngeal Updated: 12/12/21 1411     Specimen source NASAL        Comment: RAPID ONLY        COVID-19 rapid test Detected        Comment: RESULTS VERIFIED, PHONED TO AND READ BACK BY  Pebbles Adam RN ON 12/12/21 @1410, TA       The specimen is POSITIVE for SARS-CoV-2, the novel coronavirus associated with COVID-19. This test has been authorized by the FDA under an Emergency Use Authorization (EUA) for use by authorized laboratories.         Fact sheet for Healthcare Providers: kstattoo.com  Fact sheet for Patients: kstattoo.com       Methodology: Isothermal Nucleic Acid Amplification         BLOOD CULTURE [[id number]]     Order Status: Canceled Specimen: Blood           Other Studies:  No results found. Current Meds:  Current Facility-Administered Medications   Medication Dose Route Frequency    dexamethasone (DECADRON) 10 mg/mL injection 10 mg  10 mg IntraVENous Q12H    famotidine (PEPCID) tablet 20 mg  20 mg Oral BID    DULoxetine (CYMBALTA) capsule 30 mg  30 mg Oral DAILY    atenoloL (TENORMIN) tablet 50 mg  50 mg Oral DAILY    traMADoL (ULTRAM) tablet 50 mg  50 mg Oral Q6H PRN    sodium chloride (NS) flush 5-40 mL  5-40 mL IntraVENous PRN    acetaminophen (TYLENOL) tablet 650 mg  650 mg Oral Q6H PRN    Or    acetaminophen (TYLENOL) suppository 650 mg  650 mg Rectal Q6H PRN    polyethylene glycol (MIRALAX) packet 17 g  17 g Oral DAILY PRN    ondansetron (ZOFRAN ODT) tablet 4 mg  4 mg Oral Q8H PRN    Or    ondansetron (ZOFRAN) injection 4 mg  4 mg IntraVENous Q6H PRN    guaiFENesin-dextromethorphan (ROBITUSSIN DM) 100-10 mg/5 mL syrup 5 mL  5 mL Oral Q4H PRN    benzonatate (TESSALON) capsule 100 mg  100 mg Oral TID PRN    baricitinib (OLUMIANT) tablet 4 mg  4 mg Oral DAILY    enoxaparin (LOVENOX) injection 30 mg  30 mg SubCUTAneous Q12H    diclofenac (VOLTAREN) 1 % topical gel 1-2 g  1-2 g Topical TID PRN    sodium chloride (NS) flush 5-10 mL  5-10 mL IntraVENous Q8H    sodium chloride (NS) flush 5-10 mL  5-10 mL IntraVENous PRN       Signed:  Rosi Deleon MD    Part of this note may have been written by using a voice dictation software. The note has been proof read but may still contain some grammatical/other typographical errors.

## 2021-12-19 NOTE — PROGRESS NOTES
Received report from Patrick, [address] [facility]. Patient awake and in bed. A&O x4. Respirations present. On 10L HFNC with O2 sat at 91%. No signs of distress. No needs expressed. Bed low and locked. Call light within reach. Will continue to monitor.

## 2021-12-20 PROCEDURE — 97530 THERAPEUTIC ACTIVITIES: CPT

## 2021-12-20 PROCEDURE — 74011250636 HC RX REV CODE- 250/636: Performed by: INTERNAL MEDICINE

## 2021-12-20 PROCEDURE — 74011250637 HC RX REV CODE- 250/637: Performed by: EMERGENCY MEDICINE

## 2021-12-20 PROCEDURE — 94762 N-INVAS EAR/PLS OXIMTRY CONT: CPT

## 2021-12-20 PROCEDURE — 74011250637 HC RX REV CODE- 250/637: Performed by: STUDENT IN AN ORGANIZED HEALTH CARE EDUCATION/TRAINING PROGRAM

## 2021-12-20 PROCEDURE — 74011250637 HC RX REV CODE- 250/637: Performed by: INTERNAL MEDICINE

## 2021-12-20 PROCEDURE — 74011250636 HC RX REV CODE- 250/636: Performed by: STUDENT IN AN ORGANIZED HEALTH CARE EDUCATION/TRAINING PROGRAM

## 2021-12-20 PROCEDURE — 97110 THERAPEUTIC EXERCISES: CPT

## 2021-12-20 PROCEDURE — 65270000029 HC RM PRIVATE

## 2021-12-20 PROCEDURE — 77010033711 HC HIGH FLOW OXYGEN

## 2021-12-20 RX ORDER — ALBUTEROL SULFATE 90 UG/1
2 AEROSOL, METERED RESPIRATORY (INHALATION)
Status: DISCONTINUED | OUTPATIENT
Start: 2021-12-20 | End: 2021-12-27 | Stop reason: HOSPADM

## 2021-12-20 RX ADMIN — Medication 10 ML: at 06:09

## 2021-12-20 RX ADMIN — FAMOTIDINE 20 MG: 20 TABLET ORAL at 16:16

## 2021-12-20 RX ADMIN — BARICITINIB 4 MG: 2 TABLET, FILM COATED ORAL at 08:39

## 2021-12-20 RX ADMIN — DULOXETINE HYDROCHLORIDE 30 MG: 30 CAPSULE, DELAYED RELEASE ORAL at 08:39

## 2021-12-20 RX ADMIN — Medication 10 ML: at 20:43

## 2021-12-20 RX ADMIN — ALBUTEROL SULFATE 2 PUFF: 90 AEROSOL, METERED RESPIRATORY (INHALATION) at 22:00

## 2021-12-20 RX ADMIN — ATENOLOL 50 MG: 50 TABLET ORAL at 08:39

## 2021-12-20 RX ADMIN — ENOXAPARIN SODIUM 30 MG: 100 INJECTION SUBCUTANEOUS at 08:39

## 2021-12-20 RX ADMIN — DEXAMETHASONE SODIUM PHOSPHATE 10 MG: 10 INJECTION, SOLUTION INTRAMUSCULAR; INTRAVENOUS at 08:39

## 2021-12-20 RX ADMIN — DEXAMETHASONE SODIUM PHOSPHATE 10 MG: 10 INJECTION, SOLUTION INTRAMUSCULAR; INTRAVENOUS at 20:44

## 2021-12-20 RX ADMIN — ENOXAPARIN SODIUM 30 MG: 100 INJECTION SUBCUTANEOUS at 20:44

## 2021-12-20 RX ADMIN — FAMOTIDINE 20 MG: 20 TABLET ORAL at 08:39

## 2021-12-20 RX ADMIN — Medication 10 ML: at 12:04

## 2021-12-20 NOTE — PROGRESS NOTES
Patient resting quietly in bed. Respirations even and unlabored. On 10L HFNC with O2 sat at 90%. No signs of distress. No needs expressed. To give report to oncoming RN.

## 2021-12-20 NOTE — PROGRESS NOTES
MSN, CM:  Patient currently on 10L NC. Patient has received Baricitinib and Decadron during this admission. PT/OT recommend [facility] when medically stable for discharged. Case Management will continue to follow.

## 2021-12-20 NOTE — PROGRESS NOTES
ACUTE PHYSICAL THERAPY GOALS:  (Developed with and agreed upon by patient and/or caregiver.)  (1.)Ms. Livia Florez will move from supine to sit and sit to supine , scoot up and down and roll side to side in bed with MODIFIED INDEPENDENCE within 7 treatment day(s). goal met  21  (2.)Ms. Livia Florez will transfer from bed to chair and chair to bed with MODIFIED INDEPENDENCE using the least restrictive device within 7 treatment day(s). goal met 1721  (3.)Ms. Livia Florez will ambulate with MODIFIED INDEPENDENCE for 100 feet with the least restrictive device within 7 treatment day(s). (4.)Ms. Livia Florez will participate in therapeutic activity/exercises x 24 minutes for increased activity tolerance with SpO2 above 90% within 7 treatment days. PHYSICAL THERAPY: Daily Note and AM Treatment Day # 6    Ronaldo Moran is a 64 y.o. female   PRIMARY DIAGNOSIS: Pneumonia due to COVID-19 virus  Pneumonia due to COVID-19 virus [U07.1, J12.82]         ASSESSMENT:     REHAB RECOMMENDATIONS: CURRENT LEVEL OF FUNCTION:  (Most Recently Demonstrated)   Recommendation to date pending progress:  [address] [place] [address] Therapy  Equipment:    To Be Determined Bed Mobility:   Independent  Sit to Stand:   Supervision  Transfers:   Supervision  Gait/Mobility:   Supervision     ASSESSMENT:  Ms. Livia Florez was supine in bed. She states she was up in the chair earlier and declined ambulation at this time due to fatigue. Agreeable to seated exercises. Bed mobility with IND and good participation with exercises. Minimal cueing for technique and frequent rest breaks to allow for recovery. Patient education on pacing, home, and HEP.      SUBJECTIVE:   Ms. Livia Florez states, \"I want to do what I have to- to get better\"    SOCIAL HISTORY/ LIVING ENVIRONMENT: Lives with room mates and prior to getting sick pt was   independent with ambulation; recently getting help from room mates for ambulating  Home Environment: Private residence  One/Two Story Residence: One story  Living Alone: No  Support Systems: Friend/Neighbor  OBJECTIVE:     PAIN: VITAL SIGNS: LINES/DRAINS:   Pre Treatment: Pain Screen  Pain Scale 1: Numeric (0 - 10)  Pain Intensity 1: 0/10  Post Treatment: 0/10     O2 Device: Hi flow nasal cannula 10L     MOBILITY: I Mod I S SBA CGA Min Mod Max Total  NT x2 Comments:   Bed Mobility    Rolling [x] [] [] [] [] [] [] [] [] [] []    Supine to Sit [x] [] [] [] [] [] [] [] [] [] []    Scooting [x] [] [] [] [] [] [] [] [] [] []    Sit to Supine [x] [] [] [] [] [] [] [] [] [] []    Transfers    Sit to Stand [] [] [] [] [] [] [] [] [] [x] []    Bed to Chair [] [] [] [] [] [] [] [] [] [x] []    Stand to Sit [] [] [] [] [] [] [] [] [] [x] []    I=Independent, Mod I=Modified Independent, S=Supervision, SBA=Standby Assistance, CGA=Contact Guard Assistance,   Min=Minimal Assistance, Mod=Moderate Assistance, Max=Maximal Assistance, Total=Total Assistance, NT=Not Tested    BALANCE: Good Fair+ Fair Fair- Poor NT Comments   Sitting Static [x] [] [] [] [] []    Sitting Dynamic [x] [] [] [] [] []              Standing Static [] [] [] [] [] [x]    Standing Dynamic [] [] [] [] [] [x]      GAIT: I Mod I S SBA CGA Min Mod Max Total  NT x2 Comments:   Level of Assistance [] [] [x] [] [] [] [] [] [] [x] [] x1-2   Distance     DME None    Gait Quality n/a    Weightbearing  Status N/A     I=Independent, Mod I=Modified Independent, S=Supervision, SBA=Standby Assistance, CGA=Contact Guard Assistance,   Min=Minimal Assistance, Mod=Moderate Assistance, Max=Maximal Assistance, Total=Total Assistance, NT=Not Tested    PLAN:   FREQUENCY/DURATION: PT Plan of Care: 3 times/week for duration of hospital stay or until stated goals are met, whichever comes first.  TREATMENT:     TREATMENT:   ($$ Therapeutic Activity: 8-22 mins  $$ Therapeutic Exercises: 23-37 mins    )  Therapeutic Activity (8 Minutes):  Therapeutic activity included Rolling, Supine to Sit, Sit to Supine, Scooting and Sitting balance  to improve functional Mobility, Strength and Activity tolerance. Therapeutic Exercise (31 Minutes): Therapeutic exercises noted below to improve functional activity tolerance and AROM.        TREATMENT GRID:   Date:  12/20 Date:   Date:     Activity/Exercise Parameters Parameters Parameters   Heel/toe taps x10     marching x10     LAQ x10     Shoulder flexion x10     Shoulder horizontal abd x10                       AFTER TREATMENT POSITION/PRECAUTIONS:  Bed, Needs within reach and RN notified    INTERDISCIPLINARY COLLABORATION:  RN/PCT and PT/PTA    TOTAL TREATMENT DURATION:  PT Patient Time In/Time Out  Time In: 1430  Time Out:  [address], PTA

## 2021-12-20 NOTE — PROGRESS NOTES
Hospitalist Progress Note   Admit Date:  2021 10:33 PM   Name:  Chelle Hale   Age:  64 y.o. Sex:  female  :  1965   MRN:  [id number]   Room:  [facility]/    Presenting Complaint: Positive For Covid-19    Reason(s) for Admission: Pneumonia due to COVID-19 virus [U07.1, [facility] Course & Interval History:   Mrs. Ceci Cheema is a nice 65 y/o WF with a h/o HTN, fibromyalgia, psoriatic arthrisis who was admitted to our service on  with acute hypoxemic respiratory failure 2/2 COVID-19. Was previously on sulfasalazine for PA but has been off this for a while. She was initially on a couple liters NC O2, this increased to 4L on  and then to 10L the following day. CT chest negative for PE but does show bilateral infiltrates. O2 up to 15L, dexamethasone increased. Slowly weaning O2. Subjective (21): Feeling better, more active each day and tolerating better from SOB/stamina standpoint. Still desats with exertion and O2 needs have hovered 10-15L lately. No chest pain, N/V/D. Assessment & Plan:   # Sepsis, acute hypoxemic respiratory failure and bilateral pneumonia 2/2 COVID-19              - Tachycardia + fever + leukocytosis + infiltrate on CXR. Sepsis now resolved. CT chest b/l infiltrates, no PE.              - Still desaturating with exertion and recovers easily but was increased to 15L this morning, now down to 12L. Con't to wean as able, has been hard to get <10L so far.      # Fibromyalgia              - Cymbalta     # HTN              - Atenolol    Dispo/Discharge Planning: Home when able.   Diet:  ADULT ORAL NUTRITION SUPPLEMENT Breakfast, Lunch, Dinner; Standard High Calorie/High Protein  ADULT ORAL NUTRITION SUPPLEMENT Breakfast, Lunch, Dinner; Frozen Supplement  ADULT DIET Regular  DVT PPx: Lovenox  Code status: Full Code    Hospital Problems as of 2021 Date Reviewed: 2021          Codes Class Noted - Resolved POA    * (Principal) Pneumonia due to COVID-19 virus ICD-10-CM: U07.1, J12.82  ICD-9-CM: 480.8, 079.89  12/12/2021 - Present Yes        Psoriatic arthritis ([facility] ICD-10-CM: L40.50  ICD-9-CM: 696.0  12/12/2021 - Present Yes        Primary hypertension ICD-10-CM: I10  ICD-9-CM: 401.9  5/24/2013 - Present Yes        Fibromyalgia ICD-10-CM: M79.7  ICD-9-CM: 729.1  5/24/2013 - Present Yes        RESOLVED: Sepsis ([facility] ICD-10-CM: A41.9  ICD-9-CM: 038.9, 995.91  12/15/2021 - 12/15/2021 Yes        RESOLVED: Thrombocytopenia ([facility] ICD-10-CM: D69.6  ICD-9-CM: 287.5  12/15/2021 - 12/15/2021 Unknown        RESOLVED: Diarrhea ICD-10-CM: R19.7  ICD-9-CM: 787.91  12/15/2021 - 12/15/2021 Unknown              Objective:     Patient Vitals for the past 24 hrs:   Temp Pulse Resp BP SpO2   12/20/21 0951     92 %   12/20/21 0759 97.6 °F (36.4 °C) 64 18 119/73 92 %   12/20/21 0328 97.5 °F (36.4 °C) 60 18 120/80 92 %   12/19/21 2301 98.5 °F (36.9 °C) 66 18 116/65 91 %   12/19/21 2008     96 %   12/19/21 1919 97.4 °F (36.3 °C) 68 18 120/80 92 %   12/19/21 1532  65  125/81 91 %   12/19/21 1157 98 °F (36.7 °C) 68 18 115/69 94 %     Oxygen Therapy  O2 Sat (%): 92 % (12/20/21 0951)  Pulse via Oximetry: 73 beats per minute (12/20/21 0951)  O2 Device: Hi flow nasal cannula (12/20/21 0951)  O2 Flow Rate (L/min): 10 l/min (12/20/21 0951)    Estimated body mass index is 34.7 kg/m² as calculated from the following:    Height as of this encounter: 5' 6\" (1.676 m). Weight as of this encounter: 97.5 kg (215 lb). Intake/Output Summary (Last 24 hours) at 12/20/2021 1009  Last data filed at 12/20/2021 0929  Gross per 24 hour   Intake 490 ml   Output 150 ml   Net 340 ml         Physical Exam:   Blood pressure 119/73, pulse 64, temperature 97.6 °F (36.4 °C), resp. rate 18, height 5' 6\" (1.676 m), weight 97.5 kg (215 lb), last menstrual period 11/02/2014, SpO2 92 %, not currently breastfeeding. General:    Well nourished. No overt distress.   Head:  Normocephalic, atraumatic  Eyes:  Sclerae appear normal.  Pupils equally round. ENT:  Nares appear normal, no drainage. Moist oral mucosa  Neck:  No restricted ROM. Trachea midline   CV:   RRR. No m/r/g. No jugular venous distension. Lungs:   BB rales, 12L NC O2, sats low 90s at rest. No rhonchi or wheezing. Abdomen: Bowel sounds present. Soft, nontender, nondistended. Extremities: No cyanosis or clubbing. No edema  Skin:     No rashes and normal coloration. Warm and dry. Neuro:  CN II-XII grossly intact. Sensation intact. A&Ox3  Psych:  Normal mood and affect. I have reviewed ordered lab tests and independently visualized imaging below:    Recent Labs:  No results found for this or any previous visit (from the past 48 hour(s)). All Micro Results     Procedure Component Value Units Date/Time    BLOOD CULTURE [[id number]] Collected: 12/11/21 2256    Order Status: Completed Specimen: Blood Updated: 12/17/21 0731     Special Requests: NO SPECIAL REQUESTS        Culture result: NO GROWTH 5 DAYS       CULTURE, STOOL [[id number]] Collected: 12/12/21 1640    Order Status: Completed Specimen: Stool Updated: 12/15/21 9101     Special Requests: NO SPECIAL REQUESTS        Culture result:       No Salmonella, Shigella, or Ecoli 0157 isolated. OVA & PARASITES, STOOL [[id number]]     Order Status: Canceled Specimen: Feces from Stool     COVID-19 RAPID TEST [[id number]]  (Abnormal) Collected: 12/12/21 1346    Order Status: Completed Specimen: Nasopharyngeal Updated: 12/12/21 1411     Specimen source NASAL        Comment: RAPID ONLY        COVID-19 rapid test Detected        Comment: RESULTS VERIFIED, PHONED TO AND READ BACK BY  Sis Cunningham RN ON 12/12/21 @1410, TA       The specimen is POSITIVE for SARS-CoV-2, the novel coronavirus associated with COVID-19. This test has been authorized by the FDA under an Emergency Use Authorization (EUA) for use by authorized laboratories.         Fact sheet for Healthcare Providers: iTendency.uy  Fact sheet for Patients: iTendency.uy       Methodology: Isothermal Nucleic Acid Amplification         BLOOD CULTURE [[id number]]     Order Status: Canceled Specimen: Blood           Other Studies:  No results found. Current Meds:  Current Facility-Administered Medications   Medication Dose Route Frequency    dexamethasone (DECADRON) 10 mg/mL injection 10 mg  10 mg IntraVENous Q12H    famotidine (PEPCID) tablet 20 mg  20 mg Oral BID    DULoxetine (CYMBALTA) capsule 30 mg  30 mg Oral DAILY    atenoloL (TENORMIN) tablet 50 mg  50 mg Oral DAILY    traMADoL (ULTRAM) tablet 50 mg  50 mg Oral Q6H PRN    sodium chloride (NS) flush 5-40 mL  5-40 mL IntraVENous PRN    acetaminophen (TYLENOL) tablet 650 mg  650 mg Oral Q6H PRN    Or    acetaminophen (TYLENOL) suppository 650 mg  650 mg Rectal Q6H PRN    polyethylene glycol (MIRALAX) packet 17 g  17 g Oral DAILY PRN    ondansetron (ZOFRAN ODT) tablet 4 mg  4 mg Oral Q8H PRN    Or    ondansetron (ZOFRAN) injection 4 mg  4 mg IntraVENous Q6H PRN    guaiFENesin-dextromethorphan (ROBITUSSIN DM) 100-10 mg/5 mL syrup 5 mL  5 mL Oral Q4H PRN    benzonatate (TESSALON) capsule 100 mg  100 mg Oral TID PRN    baricitinib (OLUMIANT) tablet 4 mg  4 mg Oral DAILY    enoxaparin (LOVENOX) injection 30 mg  30 mg SubCUTAneous Q12H    diclofenac (VOLTAREN) 1 % topical gel 1-2 g  1-2 g Topical TID PRN    sodium chloride (NS) flush 5-10 mL  5-10 mL IntraVENous Q8H    sodium chloride (NS) flush 5-10 mL  5-10 mL IntraVENous PRN       Signed:  Rajendra Zimmer MD    Part of this note may have been written by using a voice dictation software. The note has been proof read but may still contain some grammatical/other typographical errors.

## 2021-12-20 NOTE — PROGRESS NOTES
Pt resting in bed comfortably at this time, alert and oriented times 4. No distress noted, respirations even and unlabored on 15 L NC high flow, continuous pulse ox noted. Pt denies pain at this time. Pt instructed to call for assistance if needed, call light in place, will continue to monitor. BSC near the patient.

## 2021-12-20 NOTE — PROGRESS NOTES
Pt resting in bed comfortably at this time, alert and oriented times 4. No distress noted, respirations even and unlabored on 12 L NC high flow. BSC near patient. Pt instructed to call for assistance if needed, call light in place, will continue to monitor. Will prepare for bedside shift report.

## 2021-12-20 NOTE — PROGRESS NOTES
[facility] Patient awake and in bed. A&O x4. Respirations present. On 10L HFNC with O2 sat at 93%. No signs of distress. No needs expressed. Bed low and locked. Call light within reach. Will continue to monitor.

## 2021-12-21 ENCOUNTER — APPOINTMENT (OUTPATIENT)
Dept: GENERAL RADIOLOGY | Age: 56
DRG: 871 | End: 2021-12-21
Attending: INTERNAL MEDICINE
Payer: COMMERCIAL

## 2021-12-21 PROCEDURE — 65270000029 HC RM PRIVATE

## 2021-12-21 PROCEDURE — 74011250636 HC RX REV CODE- 250/636: Performed by: STUDENT IN AN ORGANIZED HEALTH CARE EDUCATION/TRAINING PROGRAM

## 2021-12-21 PROCEDURE — 94640 AIRWAY INHALATION TREATMENT: CPT

## 2021-12-21 PROCEDURE — 94762 N-INVAS EAR/PLS OXIMTRY CONT: CPT

## 2021-12-21 PROCEDURE — 97530 THERAPEUTIC ACTIVITIES: CPT

## 2021-12-21 PROCEDURE — 74011250637 HC RX REV CODE- 250/637: Performed by: EMERGENCY MEDICINE

## 2021-12-21 PROCEDURE — 77010033711 HC HIGH FLOW OXYGEN

## 2021-12-21 PROCEDURE — 74011250637 HC RX REV CODE- 250/637: Performed by: STUDENT IN AN ORGANIZED HEALTH CARE EDUCATION/TRAINING PROGRAM

## 2021-12-21 PROCEDURE — 71045 X-RAY EXAM CHEST 1 VIEW: CPT

## 2021-12-21 PROCEDURE — 94761 N-INVAS EAR/PLS OXIMETRY MLT: CPT

## 2021-12-21 PROCEDURE — 74011250636 HC RX REV CODE- 250/636: Performed by: INTERNAL MEDICINE

## 2021-12-21 PROCEDURE — 74011250637 HC RX REV CODE- 250/637: Performed by: INTERNAL MEDICINE

## 2021-12-21 RX ORDER — DEXAMETHASONE SODIUM PHOSPHATE 100 MG/10ML
10 INJECTION INTRAMUSCULAR; INTRAVENOUS EVERY 24 HOURS
Status: DISCONTINUED | OUTPATIENT
Start: 2021-12-22 | End: 2021-12-27 | Stop reason: HOSPADM

## 2021-12-21 RX ADMIN — BARICITINIB 4 MG: 2 TABLET, FILM COATED ORAL at 08:26

## 2021-12-21 RX ADMIN — ALBUTEROL SULFATE 2 PUFF: 90 AEROSOL, METERED RESPIRATORY (INHALATION) at 15:00

## 2021-12-21 RX ADMIN — DEXAMETHASONE SODIUM PHOSPHATE 10 MG: 10 INJECTION, SOLUTION INTRAMUSCULAR; INTRAVENOUS at 08:26

## 2021-12-21 RX ADMIN — ENOXAPARIN SODIUM 30 MG: 100 INJECTION SUBCUTANEOUS at 21:13

## 2021-12-21 RX ADMIN — FAMOTIDINE 20 MG: 20 TABLET ORAL at 17:01

## 2021-12-21 RX ADMIN — ALBUTEROL SULFATE 2 PUFF: 90 AEROSOL, METERED RESPIRATORY (INHALATION) at 20:00

## 2021-12-21 RX ADMIN — ALBUTEROL SULFATE 2 PUFF: 90 AEROSOL, METERED RESPIRATORY (INHALATION) at 02:25

## 2021-12-21 RX ADMIN — ATENOLOL 50 MG: 50 TABLET ORAL at 08:26

## 2021-12-21 RX ADMIN — FAMOTIDINE 20 MG: 20 TABLET ORAL at 08:26

## 2021-12-21 RX ADMIN — Medication 10 ML: at 21:15

## 2021-12-21 RX ADMIN — DEXAMETHASONE SODIUM PHOSPHATE 10 MG: 10 INJECTION, SOLUTION INTRAMUSCULAR; INTRAVENOUS at 21:14

## 2021-12-21 RX ADMIN — DULOXETINE HYDROCHLORIDE 30 MG: 30 CAPSULE, DELAYED RELEASE ORAL at 08:26

## 2021-12-21 RX ADMIN — ALBUTEROL SULFATE 2 PUFF: 90 AEROSOL, METERED RESPIRATORY (INHALATION) at 07:30

## 2021-12-21 RX ADMIN — Medication 10 ML: at 13:08

## 2021-12-21 RX ADMIN — ENOXAPARIN SODIUM 30 MG: 100 INJECTION SUBCUTANEOUS at 08:26

## 2021-12-21 NOTE — PROGRESS NOTES
Hospitalist Progress Note   Admit Date:  2021 10:33 PM   Name:  Agusto Ramirez   Age:  64 y.o. Sex:  female  :  1965   MRN:  [id number]   Room:  [facility]/    Presenting Complaint: Positive For Covid-19    Reason(s) for Admission: Pneumonia due to COVID-19 virus [U07.1, [facility] Course & Interval History:   Mrs. Christo Scott is a nice 63 y/o WF with a h/o HTN, fibromyalgia, psoriatic arthrisis who was admitted to our service on  with acute hypoxemic respiratory failure 2/2 COVID-19. Was previously on sulfasalazine for PA but has been off this for a while. She was initially on a couple liters NC O2, this increased to 4L on  and then to 10L the following day. CT chest negative for PE but does show bilateral infiltrates. O2 up to 15L, dexamethasone increased. Slowly weaning O2 but has been hard to get <10L. Subjective (21):  Up to chair, desaturated at O2 increased again to 15L with recovery. She is slowly feeling better. Does not like the food and wants something different since appetite is increased. Otherwise she is drinking supplements. No chest pain, N/V/D. Assessment & Plan:   # Sepsis, acute hypoxemic respiratory failure and bilateral pneumonia 2/2 COVID-19              - Tachycardia + fever + leukocytosis + infiltrate on CXR. Sepsis resolved. CT chest b/l infiltrates, no PE.              - Still desaturating with exertion, unable to wean <10L. Repeat CXR today. Decrease dexamethasone to once daily starting tomorrow. Baricitinib EOT . # Fibromyalgia              - Cymbalta     # HTN              - Atenolol    Dispo/Discharge Planning: Home when able pending further improvement in O2 needs.   Diet:  ADULT ORAL NUTRITION SUPPLEMENT Breakfast, Lunch, Dinner; Standard High Calorie/High Protein  ADULT ORAL NUTRITION SUPPLEMENT Breakfast, Lunch, Dinner; Frozen Supplement  ADULT DIET Regular  DVT PPx: Lovenox  Code status: Full Code    Hospital Problems as of 12/21/2021 Date Reviewed: 12/12/2021          Codes Class Noted - Resolved POA    * (Principal) Pneumonia due to COVID-19 virus ICD-10-CM: U07.1, J12.82  ICD-9-CM: 480.8, 079.89  12/12/2021 - Present Yes        Psoriatic arthritis ([facility] ICD-10-CM: L40.50  ICD-9-CM: 696.0  12/12/2021 - Present Yes        Primary hypertension ICD-10-CM: I10  ICD-9-CM: 401.9  5/24/2013 - Present Yes        Fibromyalgia ICD-10-CM: M79.7  ICD-9-CM: 729.1  5/24/2013 - Present Yes        RESOLVED: Sepsis ([facility] ICD-10-CM: A41.9  ICD-9-CM: 038.9, 995.91  12/15/2021 - 12/15/2021 Yes        RESOLVED: Thrombocytopenia ([facility] ICD-10-CM: D69.6  ICD-9-CM: 287.5  12/15/2021 - 12/15/2021 Unknown        RESOLVED: Diarrhea ICD-10-CM: R19.7  ICD-9-CM: 787.91  12/15/2021 - 12/15/2021 Unknown              Objective:     Patient Vitals for the past 24 hrs:   Temp Pulse Resp BP SpO2   12/21/21 1122 97.7 °F (36.5 °C) 72 20 104/77 93 %   12/21/21 0809 98.3 °F (36.8 °C) 62 18 (!) 148/87 93 %   12/21/21 0730     92 %   12/21/21 0404 98.1 °F (36.7 °C) 63 18 130/79 92 %   12/21/21 0225     95 %   12/21/21 0220     94 %   12/20/21 2352 97.8 °F (36.6 °C) 62 18 109/70 96 %   12/20/21 1931 97.7 °F (36.5 °C) 73 18 107/62 95 %   12/20/21 1426 97.8 °F (36.6 °C) 100 18 (!) 138/93 94 %   12/20/21 1208 97.9 °F (36.6 °C) 80 18 111/69 94 %     Oxygen Therapy  O2 Sat (%): 93 % (12/21/21 1122)  Pulse via Oximetry: 82 beats per minute (12/21/21 0730)  O2 Device: Hi flow nasal cannula (12/21/21 1036)  O2 Flow Rate (L/min): 13 l/min (12/21/21 1036)  FIO2 (%): 100 % (12/21/21 0225)    Estimated body mass index is 34.7 kg/m² as calculated from the following:    Height as of this encounter: 5' 6\" (1.676 m). Weight as of this encounter: 97.5 kg (215 lb).     Intake/Output Summary (Last 24 hours) at 12/21/2021 1131  Last data filed at 12/21/2021 1020  Gross per 24 hour   Intake 870 ml   Output 600 ml   Net 270 ml         Physical Exam:   Blood pressure 104/77, pulse 72, temperature 97.7 °F (36.5 °C), resp. rate 20, height 5' 6\" (1.676 m), weight 97.5 kg (215 lb), last menstrual period 11/02/2014, SpO2 93 %, not currently breastfeeding. General:    Well nourished. No overt distress. Head:  Normocephalic, atraumatic  Eyes:  Sclerae appear normal.  Pupils equally round. ENT:  Nares appear normal, no drainage. Moist oral mucosa  Neck:  No restricted ROM. Trachea midline   CV:   RRR. No m/r/g. No jugular venous distension. Lungs:   BB rales, decreased BS at R base. 15L NC O2, bedside sats low to mid 90s at rest in chair. Abdomen: Bowel sounds present. Soft, nontender, nondistended. Extremities: No cyanosis or clubbing. No edema  Skin:     No rashes and normal coloration. Warm and dry. Neuro:  CN II-XII grossly intact. Sensation intact. A&Ox3  Psych:  Normal mood and affect. I have reviewed ordered lab tests and independently visualized imaging below:    Recent Labs:  No results found for this or any previous visit (from the past 48 hour(s)). All Micro Results     Procedure Component Value Units Date/Time    BLOOD CULTURE [[id number]] Collected: 12/11/21 2256    Order Status: Completed Specimen: Blood Updated: 12/17/21 0731     Special Requests: NO SPECIAL REQUESTS        Culture result: NO GROWTH 5 DAYS       CULTURE, STOOL [[id number]] Collected: 12/12/21 1640    Order Status: Completed Specimen: Stool Updated: 12/15/21 2548     Special Requests: NO SPECIAL REQUESTS        Culture result:       No Salmonella, Shigella, or Ecoli 0157 isolated.           OVA & PARASITES, STOOL [[id number]]     Order Status: Canceled Specimen: Feces from Stool     COVID-19 RAPID TEST [[id number]]  (Abnormal) Collected: 12/12/21 1346    Order Status: Completed Specimen: Nasopharyngeal Updated: 12/12/21 1411     Specimen source NASAL        Comment: RAPID ONLY        COVID-19 rapid test Detected        Comment: RESULTS VERIFIED, PHONED TO AND READ BACK BY  Luis Eduardo Lund RN ON 12/12/21 @1410, TA       The specimen is POSITIVE for SARS-CoV-2, the novel coronavirus associated with COVID-19. This test has been authorized by the FDA under an Emergency Use Authorization (EUA) for use by authorized laboratories. Fact sheet for Healthcare Providers: ConventionUpdate.co.nz  Fact sheet for Patients: ConventionUpdate.co.nz       Methodology: Isothermal Nucleic Acid Amplification         BLOOD CULTURE [[id number]]     Order Status: Canceled Specimen: Blood           Other Studies:  XR CHEST SNGL V    Result Date: 12/21/2021  EXAM: CHEST X-RAY, 1 VIEW INDICATION: COVID, resp failure. COMPARISON: Chest CT 12/15/2021, radiograph 12/11/2021 TECHNIQUE: Single AP view of the chest was obtained. FINDINGS: Multifocal infiltrates diffusely throughout both lungs, which are worse than on the 12/11/2021 x-ray but essentially stable since the 12/15/2021 CT. No pneumothorax or effusion. The cardiac silhouette is stable. Multifocal infiltrates diffusely throughout both lungs.        Current Meds:  Current Facility-Administered Medications   Medication Dose Route Frequency    [START ON 12/22/2021] dexamethasone (DECADRON) 10 mg/mL injection 10 mg  10 mg IntraVENous Q24H    albuterol (PROVENTIL HFA, VENTOLIN HFA, PROAIR HFA) inhaler 2 Puff  2 Puff Inhalation Q6H RT    dexamethasone (DECADRON) 10 mg/mL injection 10 mg  10 mg IntraVENous Q12H    famotidine (PEPCID) tablet 20 mg  20 mg Oral BID    DULoxetine (CYMBALTA) capsule 30 mg  30 mg Oral DAILY    atenoloL (TENORMIN) tablet 50 mg  50 mg Oral DAILY    traMADoL (ULTRAM) tablet 50 mg  50 mg Oral Q6H PRN    sodium chloride (NS) flush 5-40 mL  5-40 mL IntraVENous PRN    acetaminophen (TYLENOL) tablet 650 mg  650 mg Oral Q6H PRN    Or    acetaminophen (TYLENOL) suppository 650 mg  650 mg Rectal Q6H PRN    polyethylene glycol (MIRALAX) packet 17 g  17 g Oral DAILY PRN    ondansetron (ZOFRAN ODT) tablet 4 mg  4 mg Oral Q8H PRN    Or    ondansetron (ZOFRAN) injection 4 mg  4 mg IntraVENous Q6H PRN    guaiFENesin-dextromethorphan (ROBITUSSIN DM) 100-10 mg/5 mL syrup 5 mL  5 mL Oral Q4H PRN    benzonatate (TESSALON) capsule 100 mg  100 mg Oral TID PRN    baricitinib (OLUMIANT) tablet 4 mg  4 mg Oral DAILY    enoxaparin (LOVENOX) injection 30 mg  30 mg SubCUTAneous Q12H    diclofenac (VOLTAREN) 1 % topical gel 1-2 g  1-2 g Topical TID PRN    sodium chloride (NS) flush 5-10 mL  5-10 mL IntraVENous Q8H    sodium chloride (NS) flush 5-10 mL  5-10 mL IntraVENous PRN       Signed:  Terra Najjar, MD    Part of this note may have been written by using a voice dictation software. The note has been proof read but may still contain some grammatical/other typographical errors.

## 2021-12-21 NOTE — PROGRESS NOTES
Bedside report received from [facility], RN. No distress on 12L HF. Pt watching television. Resp even and unlabored. Instructed pt to call should needs arise. Call light within reach. Pt voiced understanding.

## 2021-12-21 NOTE — PROGRESS NOTES
Pt desats easily with any movement. .  Pt assisted with bed pan and oxygen sat drops to 85. Placed on NRB with 15L HF for a few minutes. Oxygen sat increased to 99%. Removed NRB and oxygen sat is 93% on 15 L HF. Will update oncoming nurse.

## 2021-12-21 NOTE — PROGRESS NOTES
Comprehensive Nutrition Assessment    Type and Reason for Visit: Reassess    Nutrition Recommendations/Plan:   Meals and Snacks:  Continue current diet. Food preferences added. Nutrition Supplement Therapy:   Medical food supplement therapy:  Continue Ensure Enlive three times per day (this provides 350 kcal and 20 grams protein per bottle)--chocolate   Medical food supplement therapy: Continue Magic Cup three times per day (this provides 290 kcal and 9 grams protein per serving)--chocolate (no mixed berry)   Encouraged pt to request miralax as needed. Malnutrition Assessment:  Malnutrition Status: At risk for malnutrition (specify) (poor po intake and reported wt loss PTA, poor po admission)    Nutrition Assessment:   Nutrition History: Pt states poor po intake x2 weeks PTA due to loss of appetite. She states not eating any meals or snacks and only drinking water at that time. Pt reports UBW of 225lbs on 12/5 at dr office visit. Unable to verify wt on this date per EMR. Noted wt los 224lbs at PCP on 11/9. Cultural/Alevism/Ethnic Food Preference(s): None   Nutrition Background: Pt with PMH notable for GERD, IBS, HTN, and psoriatic arthritis. Pt admitted for PNA due to COVID-19. Daily Update:  Pt contacted via personal cell phone d/t isolation precautions and room phone not working. Pt reports that her appetite has improved however her taste has not. She states she does not care for the taste of the majority of food served at meal times and requests specific items be sent for breakfast and lunch/dinner. Per pt, drinking ensure with meals. She states that she would consume magic cup if a different flavor was sent. Pt has c/o constipation x 1 week - noted recorded BM x 1 yesterday. Reinforced that pt has PRN miralax ordered. Abdominal Status (last documented): Obese abdomen with Active  bowel sounds. Last BM 12/20/21.   Pertinent Medications: Decadron 10 mg q 12 - decreased to 10 mg q24 12/22, Miralax PRN, Pepcid    Nutrition Related Findings:   NFPE deferred due to isolation precautions. Current Nutrition Therapies:  ADULT ORAL NUTRITION SUPPLEMENT Breakfast, Lunch, Dinner; Standard High Calorie/High Protein  ADULT ORAL NUTRITION SUPPLEMENT Breakfast, Lunch, Dinner; Frozen Supplement  ADULT DIET Regular    Current Intake:   Average Meal Intake: 51-75% Average Supplement Intake: Unable to assess (reports intake of 3 ensure/day)      Anthropometric Measures:  Height: 5' 6\" (167.6 cm)  Current Body Wt: 97.5 kg (214 lb 15.2 oz) (12/18), Weight source: Not specified  BMI: 34.7, Obese class 1 (BMI 30.0-34. 9)  Admission Body Weight: 220 lb 0.3 oz (12/12; bed scale)  Ideal Body Weight (lbs) (Calculated): 130 lbs (59 kg), 165.3 %  Usual Body Wt: 102.1 kg (225 lb) (stated per pt 12/5 at MD office; unable to confirm per EMR), Percent weight change: -3.9          Edema: No data recorded   Estimated Daily Nutrient Needs:  Energy (kcal/day): 6879-0953 (Kcal/kg (25-30), Weight Used: Ideal (59.1kg))  Protein (g/day): 71-89 (1.2-1.5gm/kg) Weight Used: (Ideal (59.1kg))  Fluid (ml/day):   (1 ml/kcal)    Nutrition Diagnosis:   · Inadequate oral intake related to altered taste perception (loss of appetite) as evidenced by intake 51-75%    Nutrition Interventions:   Food and/or Nutrient Delivery: Continue current diet,Modify oral nutrition supplement (Food preferences collected.)     Coordination of Nutrition Care: Continue to monitor while inpatient  Plan of Care discussed @ IDT rounds    Goals:   Previous Goal Met: Progressing toward goal(s)  Active Goal: Meet >75% of estimated needs at time of nutrition follow up. Nutrition Monitoring and Evaluation:      Food/Nutrient Intake Outcomes: Food and nutrient intake,Supplement intake  Physical Signs/Symptoms Outcomes: GI status,Hemodynamic status,Meal time behavior,Weight    Discharge Planning:     Too soon to determine     Desirae Eddy [address], [address], [address] [place], [facility] [address]

## 2021-12-21 NOTE — PROGRESS NOTES
ACUTE OT GOALS:  (Developed with and agreed upon by patient and/or caregiver.)  1. Patient will complete lower body bathing and dressing with Mod I and adaptive equipment as needed. 2. Patient will complete toileting with Mod I and adaptive equipment as needed. 3. Patient will tolerate 23 minutes of OT treatment with 1-2 rest breaks to increase activity tolerance for ADLs. 4. Patient will complete functional transfers with Mod I and adaptive equipment as needed. 5. Patient will complete standing grooming ADL with Mod I and seated rest breaks as needed. 6. Patient will verbalize and demonstrate at least 3 energy conservation techniques to increase activity tolerance for ADLs.    Timeframe: 7 visits     OCCUPATIONAL THERAPY: Daily Note and AM OT Treatment Day # 5    Agusto Ramirez is a 64 y.o. female   PRIMARY DIAGNOSIS: Pneumonia due to COVID-19 virus  Pneumonia due to COVID-19 virus [U07.1, J12.82]       Payor: BLUE CROSS / Plan: [place] BLUE [facility] / Product Type: PPO /   ASSESSMENT:     REHAB RECOMMENDATIONS: CURRENT LEVEL OF FUNCTION:  (Most Recently Demonstrated)   Recommendation to date pending progress:  [address] [place] [address] pending improvement. Equipment:    To Be Determined. Pt has SC. May need RW to increase independence for functional mobility; will continue to assess. Bathing:   Not tested  Dressing:   Not tested   Feeding/Grooming:   Not tested   Toileting:   Not tested  Functional Mobility:   Standby Assistance x RW     ASSESSMENT:  Ms. Christo Scott continues to present with deficits in overall strength, balance, and activity tolerance for performance of ADLs and functional mobility, with minimal progress towards acute care OT goals this treatment session due to decreased activity tolerance for further functional mobility. Received on 15L O2 with O2 sats > than 90% at rest. Requires Supervision and additional time to complete supine <> sit.  Seated EOB, pt able to maintain O2 sats at 94%. Requires SBA x RW to complete sit <> stand and complete SPT from bed to chair as pt as been desaturating with minimal movement per RN. Pt desaturates to 86% following transfer to chair. Cued to use pursed-lip breathing and improves O2 sats to 90%. Pt able to improve O2 sats to 95% with use of pursed-lip breathing and increased rest break. Further functional ADLs/in room mobility not completed this session to conserve pt activity tolerance for PT session planned for later this morning. Pt would benefit from continued skilled OT to increase independence and safety for performance of ADLs and functional mobility. SUBJECTIVE:   Ms. Almanzar Heads states, [place] [employer] you so much for helping me get to the chair. \"    SOCIAL HISTORY/LIVING ENVIRONMENT: Lives with two roommates who work during the day in one story home with a couple of JUAN. Independent for ADLs and functional mobility. Working in manual labor position. Pt niece has brought a SC to her home. Home Environment: Private residence  One/Two Story Residence: One story  Living Alone: No  Support Systems: Friend/Neighbor    OBJECTIVE:     PAIN: VITAL SIGNS: LINES/DRAINS:   Pre Treatment: Pain Screen  Pain Scale 1: Numeric (0 - 10)  Pain Intensity 1: 0  Post Treatment: Unchanged Vital Signs  O2 Sat (%): 95 %  O2 Device: Hi flow nasal cannula  O2 Flow Rate (L/min): 15 l/min Continuous Pulse Oximetry at end of treatment session.   O2 Device: Hi flow nasal cannula     ACTIVITIES OF DAILY LIVING: I Mod I S SBA CGA Min Mod Max Total NT Comments   BASIC ADLs:              Bathing/ Showering [] [] [] [] [] [] [] [] [] [x]    Toileting [] [] [] [] [] [] [] [] [] [x]    Dressing [] [] [] [] [] [] [] [] [] [x]    Feeding [] [] [] [] [] [] [] [] [] [x]    Grooming [] [] [] [] [] [] [] [] [] [x]    Personal Device Care [] [] [] [] [] [] [] [] [] [x]    Functional Mobility [] [] [] [x] [] [] [] [] [] [] X RW   I=Independent, Mod I=Modified Independent, S=Supervision, SBA=Standby Assistance, CGA=Contact Guard Assistance,   Min=Minimal Assistance, Mod=Moderate Assistance, Max=Maximal Assistance, Total=Total Assistance, NT=Not Tested    MOBILITY: I Mod I S SBA CGA Min Mod Max Total  NT x2 Comments:   Supine to sit [] [] [x] [] [] [] [] [] [] [] []    Sit to supine [] [] [] [] [] [] [] [] [] [x] []    Sit to stand [] [] [] [x] [] [] [] [] [] [] [] X RW   Bed to chair [] [] [] [x] [] [] [] [] [] [] [] X RW to complete SPT to chair. I=Independent, Mod I=Modified Independent, S=Supervision, SBA=Standby Assistance, CGA=Contact Guard Assistance,   Min=Minimal Assistance, Mod=Moderate Assistance, Max=Maximal Assistance, Total=Total Assistance, NT=Not Tested    BALANCE: Good Fair+ Fair Fair- Poor NT Comments   Sitting Static [x] [] [] [] [] []    Sitting Dynamic [] [] [] [] [] [x]              Standing Static [] [x] [] [] [] [] SBA x RW   Standing Dynamic [] [x] [] [] [] [] SBA x RW     PLAN:   FREQUENCY/DURATION: OT Plan of Care: 3 times/week for duration of hospital stay or until stated goals are met, whichever comes first.    TREATMENT:   TREATMENT:   ( $$ Therapeutic Activity: 8-22 mins   )  Therapeutic Activity (15 Minutes): Therapeutic activity included Supine to Sit, Scooting, Transfer Training, Ambulation on level ground and Standing balance to improve functional Mobility, Strength and Activity tolerance.     TREATMENT GRID:  N/A    AFTER TREATMENT POSITION/PRECAUTIONS:  Chair, Needs within reach and RN notified    INTERDISCIPLINARY COLLABORATION:  RN/PCT and OT/LEE    TOTAL TREATMENT DURATION:  OT Patient Time In/Time Out  Time In: 0916  Time Out: 345 [facility] CHER Chapman/L

## 2021-12-21 NOTE — PROGRESS NOTES
ACUTE PHYSICAL THERAPY GOALS:  (Developed with and agreed upon by patient and/or caregiver.)  (1.)Ms. Amalia Shepherd will move from supine to sit and sit to supine , scoot up and down and roll side to side in bed with MODIFIED INDEPENDENCE within 7 treatment day(s). goal met  21  (2.)Ms. Amalia Shepherd will transfer from bed to chair and chair to bed with MODIFIED INDEPENDENCE using the least restrictive device within 7 treatment day(s). goal met 1721  (3.)Ms. Amalia Shepherd will ambulate with MODIFIED INDEPENDENCE for 100 feet with the least restrictive device within 7 treatment day(s). (4.)Ms. Amalia Shepherd will participate in therapeutic activity/exercises x 24 minutes for increased activity tolerance with SpO2 above 90% within 7 treatment days. PHYSICAL THERAPY: Daily Note and AM Treatment Day # 1    Daniela Wilkes is a 64 y.o. female   PRIMARY DIAGNOSIS: Pneumonia due to COVID-19 virus  Pneumonia due to COVID-19 virus [U07.1, J12.82]         ASSESSMENT:     REHAB RECOMMENDATIONS: CURRENT LEVEL OF FUNCTION:  (Most Recently Demonstrated)   Recommendation to date pending progress:  [address] [place] [address] Therapy  Equipment:    To Be Determined Bed Mobility:   Independent  Sit to Stand:   Supervision  Transfers:   Supervision  Gait/Mobility:   Supervision     ASSESSMENT:  Ms. Amalia Shepherd has had a rough go of it. She was on airvo initially though and currently on NC but she is on 9 liters at rest.  She desats with any activity. Just using bsc and then to bed dropped to 70s. Needed to bump up to 15 liters for recovery. She looks rough today. Had her do incentive spirometer. From a pure mobility stand point she does fine. Did not have her use rolling walker with the short distance from chair to bsc to bed. She was steady. Likely when we are able to do any distance the walker will help with energy conservation. Goals above will remain for another week.       SUBJECTIVE:   Ms. Amalia Shepherd states, \"Last night was a rough one.\"    SOCIAL HISTORY/ LIVING ENVIRONMENT: Lives with room mates and prior to getting sick pt was   independent with ambulation; recently getting help from room mates for ambulating  Home Environment: Private residence  One/Two Story Residence: One story  Living Alone: No  Support Systems: Friend/Neighbor  OBJECTIVE:     PAIN: VITAL SIGNS: LINES/DRAINS:   Pre Treatment:  /10  Post Treatment: 0/10     O2 Device: Hi flow nasal cannula 10L     MOBILITY: I Mod I S SBA CGA Min Mod Max Total  NT x2 Comments:   Bed Mobility    Rolling [x] [] [] [] [] [] [] [] [] [] []    Supine to Sit [x] [] [] [] [] [] [] [] [] [] []    Scooting [x] [] [] [] [] [] [] [] [] [] []    Sit to Supine [x] [] [] [] [] [] [] [] [] [] []    Transfers    Sit to Stand [] [] [] [] [] [] [] [] [] [x] []    Bed to Chair [] [] [] [] [] [] [] [] [] [x] []    Stand to Sit [] [] [] [] [] [] [] [] [] [x] []    I=Independent, Mod I=Modified Independent, S=Supervision, SBA=Standby Assistance, CGA=Contact Guard Assistance,   Min=Minimal Assistance, Mod=Moderate Assistance, Max=Maximal Assistance, Total=Total Assistance, NT=Not Tested    BALANCE: Good Fair+ Fair Fair- Poor NT Comments   Sitting Static [x] [] [] [] [] []    Sitting Dynamic [x] [] [] [] [] []              Standing Static [] [] [] [] [] [x]    Standing Dynamic [] [] [] [] [] [x]      GAIT: I Mod I S SBA CGA Min Mod Max Total  NT x2 Comments:   Level of Assistance [] [] [x] [] [] [] [] [] [] [x] [] x1-2   Distance     DME None    Gait Quality n/a    Weightbearing  Status N/A     I=Independent, Mod I=Modified Independent, S=Supervision, SBA=Standby Assistance, CGA=Contact Guard Assistance,   Min=Minimal Assistance, Mod=Moderate Assistance, Max=Maximal Assistance, Total=Total Assistance, NT=Not Tested    PLAN:   FREQUENCY/DURATION: PT Plan of Care: 3 times/week for duration of hospital stay or until stated goals are met, whichever comes first.  TREATMENT:     TREATMENT:   ($$ Therapeutic Activity: 8-22 mins    )  Therapeutic Activity (19 Minutes): Therapeutic activity included Rolling, Supine to Sit, Sit to Supine, Scooting, Transfer Training, Sitting balance  and bsc and incentive spirometer to improve functional Mobility, Strength and Activity tolerance.       TREATMENT GRID:   Date:  12/20 Date:   Date:     Activity/Exercise Parameters Parameters Parameters   Heel/toe taps x10     marching x10     LAQ x10     Shoulder flexion x10     Shoulder horizontal abd x10                       AFTER TREATMENT POSITION/PRECAUTIONS:  Bed, Needs within reach and RN notified    INTERDISCIPLINARY COLLABORATION:  RN/PCT and PT/PTA    TOTAL TREATMENT DURATION:  PT Patient Time In/Time Out  Time In: 1131  Time Out: [address]

## 2021-12-21 NOTE — PROGRESS NOTES
Perfect serve doctor and requested inhaler. New orders received and made Respiratory therapist aware of new orders.

## 2021-12-21 NOTE — PROGRESS NOTES
Pt desat to 82% on 10L HF alarming on pulse ox. Pt was on bedside commode. Assisted back to bed and oxygen increased to 15L HF. Oxygen sat increased to 93%. Pursed lip breathing instructions given and return demonstration given.

## 2021-12-22 PROCEDURE — 77010033711 HC HIGH FLOW OXYGEN

## 2021-12-22 PROCEDURE — 94640 AIRWAY INHALATION TREATMENT: CPT

## 2021-12-22 PROCEDURE — 74011250637 HC RX REV CODE- 250/637: Performed by: INTERNAL MEDICINE

## 2021-12-22 PROCEDURE — 77010033678 HC OXYGEN DAILY

## 2021-12-22 PROCEDURE — 94762 N-INVAS EAR/PLS OXIMTRY CONT: CPT

## 2021-12-22 PROCEDURE — 65270000029 HC RM PRIVATE

## 2021-12-22 PROCEDURE — 74011250637 HC RX REV CODE- 250/637: Performed by: STUDENT IN AN ORGANIZED HEALTH CARE EDUCATION/TRAINING PROGRAM

## 2021-12-22 PROCEDURE — 94760 N-INVAS EAR/PLS OXIMETRY 1: CPT

## 2021-12-22 PROCEDURE — 74011250636 HC RX REV CODE- 250/636: Performed by: STUDENT IN AN ORGANIZED HEALTH CARE EDUCATION/TRAINING PROGRAM

## 2021-12-22 PROCEDURE — 74011250637 HC RX REV CODE- 250/637: Performed by: EMERGENCY MEDICINE

## 2021-12-22 PROCEDURE — 74011250636 HC RX REV CODE- 250/636: Performed by: INTERNAL MEDICINE

## 2021-12-22 RX ADMIN — FAMOTIDINE 20 MG: 20 TABLET ORAL at 17:30

## 2021-12-22 RX ADMIN — DEXAMETHASONE SODIUM PHOSPHATE 10 MG: 10 INJECTION, SOLUTION INTRAMUSCULAR; INTRAVENOUS at 08:25

## 2021-12-22 RX ADMIN — ENOXAPARIN SODIUM 30 MG: 100 INJECTION SUBCUTANEOUS at 08:28

## 2021-12-22 RX ADMIN — FAMOTIDINE 20 MG: 20 TABLET ORAL at 08:25

## 2021-12-22 RX ADMIN — ATENOLOL 50 MG: 50 TABLET ORAL at 08:28

## 2021-12-22 RX ADMIN — ALBUTEROL SULFATE 2 PUFF: 90 AEROSOL, METERED RESPIRATORY (INHALATION) at 14:28

## 2021-12-22 RX ADMIN — BARICITINIB 4 MG: 2 TABLET, FILM COATED ORAL at 08:25

## 2021-12-22 RX ADMIN — ALBUTEROL SULFATE 2 PUFF: 90 AEROSOL, METERED RESPIRATORY (INHALATION) at 07:30

## 2021-12-22 RX ADMIN — DULOXETINE HYDROCHLORIDE 30 MG: 30 CAPSULE, DELAYED RELEASE ORAL at 08:25

## 2021-12-22 RX ADMIN — ENOXAPARIN SODIUM 30 MG: 100 INJECTION SUBCUTANEOUS at 21:18

## 2021-12-22 RX ADMIN — ALBUTEROL SULFATE 2 PUFF: 90 AEROSOL, METERED RESPIRATORY (INHALATION) at 19:38

## 2021-12-22 RX ADMIN — Medication 10 ML: at 21:19

## 2021-12-22 RX ADMIN — ALBUTEROL SULFATE 2 PUFF: 90 AEROSOL, METERED RESPIRATORY (INHALATION) at 02:00

## 2021-12-22 RX ADMIN — Medication 10 ML: at 17:30

## 2021-12-22 NOTE — PROGRESS NOTES
Sitting up in bed  Alert  Oriented  Oxygen at 14L high flow  Sat 92% Saturation drops to low 80s when out of bed

## 2021-12-22 NOTE — PROGRESS NOTES
Pt remains on 14L HF with continuous pulse ox. No distress at present. Continues to have dyspnea with exertion. Call light remains at bedside. Will update oncoming nurse.

## 2021-12-22 NOTE — PROGRESS NOTES
Assisted patient to bedside commode. Desats into [de-identified]. Increased oxygen to 94% on 15L HF with activity. Assisted back to bed. Pt has been using incentive spirometer.

## 2021-12-22 NOTE — PROGRESS NOTES
Hospitalist Progress Note   Admit Date:  2021 10:33 PM   Name:  Mary Franklin   Age:  64 y.o. Sex:  female  :  1965   MRN:  [id number]   Room:  [facility]/    Presenting Complaint: Positive For Covid-19    Reason(s) for Admission: Pneumonia due to COVID-19 virus [U07.1, [facility] Course & Interval History:   Mrs. Chinmay Zamorano is a nice 63 y/o WF with a h/o HTN, fibromyalgia, psoriatic arthrisis who was admitted to our service on  with acute hypoxemic respiratory failure 2/2 COVID-19. Was previously on sulfasalazine for PA but has been off this for a while. She was initially on a couple liters NC O2, this increased to 4L on  and then to 10L the following day. CT chest negative for PE but does show bilateral infiltrates. O2 up to 15L, dexamethasone increased. Slowly weaning O2 but has been hard to get <10L. Subjective (21): Says breathing better, presently on 14 L/min    Assessment & Plan:   # Sepsis, acute hypoxemic respiratory failure and bilateral pneumonia 2/2 COVID-19              - Tachycardia + fever + leukocytosis + infiltrate on CXR. Sepsis resolved. CT chest b/l infiltrates, no PE.              - Still desaturating with exertion, unable to wean <10L. Repeat CXR today. Decrease dexamethasone to once daily starting tomorrow. Baricitinib EOT .  2021  Patient on 14 L/min, continue Decadron and baricitinib.     # Fibromyalgia              - Cymbalta     # HTN              - Atenolol    Diet:  ADULT ORAL NUTRITION SUPPLEMENT Breakfast, Lunch, Dinner; Standard High Calorie/High Protein  ADULT ORAL NUTRITION SUPPLEMENT Breakfast, Lunch, Dinner; Frozen Supplement  ADULT DIET Regular; daily breakfast: toast with jelly and galna; daily lunch/dinner: fruit and sandwich. Chocolate magic cup with all meals. No mixed berry.   DVT PPx: Lovenox  Code status: Full Code    Hospital Problems as of 2021 Date Reviewed: 2021          Codes Class Noted - Resolved POA    * (Principal) Pneumonia due to COVID-19 virus ICD-10-CM: U07.1, J12.82  ICD-9-CM: 480.8, 079.89  12/12/2021 - Present Yes        Psoriatic arthritis ([facility] ICD-10-CM: L40.50  ICD-9-CM: 696.0  12/12/2021 - Present Yes        Primary hypertension ICD-10-CM: I10  ICD-9-CM: 401.9  5/24/2013 - Present Yes        Fibromyalgia ICD-10-CM: M79.7  ICD-9-CM: 729.1  5/24/2013 - Present Yes        RESOLVED: Sepsis ([facility] ICD-10-CM: A41.9  ICD-9-CM: 038.9, 995.91  12/15/2021 - 12/15/2021 Yes        RESOLVED: Thrombocytopenia ([facility] ICD-10-CM: D69.6  ICD-9-CM: 287.5  12/15/2021 - 12/15/2021 Unknown        RESOLVED: Diarrhea ICD-10-CM: R19.7  ICD-9-CM: 787.91  12/15/2021 - 12/15/2021 Unknown              Objective:     Patient Vitals for the past 24 hrs:   Temp Pulse Resp BP SpO2   12/22/21 0737 98.5 °F (36.9 °C) 85  112/71 90 %   12/22/21 0730     94 %   12/22/21 0345 97.9 °F (36.6 °C) 62 18 116/75 95 %   12/22/21 0137     96 %   12/21/21 2217 98.1 °F (36.7 °C) 63 18 125/87 100 %   12/21/21 2105     100 %   12/21/21 2009     92 %   12/21/21 1918 98.2 °F (36.8 °C) 65 18 117/66 96 %   12/21/21 1533 98.7 °F (37.1 °C) 71 18 97/64 95 %   12/21/21 1500     96 %     Oxygen Therapy  O2 Sat (%): 90 % (12/22/21 0737)  Pulse via Oximetry: 74 beats per minute (12/22/21 0730)  O2 Device: Hi flow nasal cannula (12/22/21 0730)  O2 Flow Rate (L/min): 14 l/min (Decreased to 12L/m) (12/22/21 0730)  FIO2 (%): 94 % (12/22/21 0730)    Estimated body mass index is 34.73 kg/m² as calculated from the following:    Height as of this encounter: 5' 6\" (1.676 m). Weight as of this encounter: 97.6 kg (215 lb 3.2 oz). Intake/Output Summary (Last 24 hours) at 12/22/2021 1132  Last data filed at 12/22/2021 0349  Gross per 24 hour   Intake 360 ml   Output 450 ml   Net -90 ml         Physical Exam:     Blood pressure 112/71, pulse 85, temperature 98.5 °F (36.9 °C), resp.  rate 18, height 5' 6\" (1.676 m), weight 97.6 kg (215 lb 3.2 oz), last menstrual period 11/02/2014, SpO2 90 %, not currently breastfeeding. General:    Well nourished. Mild respiratory distress, presently on 14 L/min  Head:  Normocephalic, atraumatic  Eyes:  Sclerae appear normal.  Pupils equally round. ENT:  Nares appear normal, no drainage. Moist oral mucosa  Neck:  No restricted ROM. Trachea midline   CV:   RRR. No m/r/g. No jugular venous distension. Lungs:   Bilateral coarse breath sounds   Extremities: No cyanosis or clubbing. No edema  Skin:     No rashes and normal coloration. Warm and dry. Neuro:  CN II-XII grossly intact. Sensation intact. A&Ox3  Psych:  Normal mood and affect. I have reviewed ordered lab tests and independently visualized imaging below:    Recent Labs:  No results found for this or any previous visit (from the past 48 hour(s)). All Micro Results     Procedure Component Value Units Date/Time    BLOOD CULTURE [[id number]] Collected: 12/11/21 2256    Order Status: Completed Specimen: Blood Updated: 12/17/21 0731     Special Requests: NO SPECIAL REQUESTS        Culture result: NO GROWTH 5 DAYS       CULTURE, STOOL [[id number]] Collected: 12/12/21 1640    Order Status: Completed Specimen: Stool Updated: 12/15/21 2400     Special Requests: NO SPECIAL REQUESTS        Culture result:       No Salmonella, Shigella, or Ecoli 0157 isolated. OVA & PARASITES, STOOL [[id number]]     Order Status: Canceled Specimen: Feces from Stool     COVID-19 RAPID TEST [[id number]]  (Abnormal) Collected: 12/12/21 1346    Order Status: Completed Specimen: Nasopharyngeal Updated: 12/12/21 1411     Specimen source NASAL        Comment: RAPID ONLY        COVID-19 rapid test Detected        Comment: RESULTS VERIFIED, PHONED TO AND READ BACK BY  Yeny Patel RN ON 12/12/21 @1410, TA       The specimen is POSITIVE for SARS-CoV-2, the novel coronavirus associated with COVID-19.        This test has been authorized by the FDA under an Emergency Use Authorization (EUA) for use by authorized laboratories. Fact sheet for Healthcare Providers: ConventionUpdate.co.nz  Fact sheet for Patients: ConventionUpdate.co.nz       Methodology: Isothermal Nucleic Acid Amplification         BLOOD CULTURE [[id number]]     Order Status: Canceled Specimen: Blood           Other Studies:  No results found.     Current Meds:  Current Facility-Administered Medications   Medication Dose Route Frequency    dexamethasone (DECADRON) 10 mg/mL injection 10 mg  10 mg IntraVENous Q24H    albuterol (PROVENTIL HFA, VENTOLIN HFA, PROAIR HFA) inhaler 2 Puff  2 Puff Inhalation Q6H RT    famotidine (PEPCID) tablet 20 mg  20 mg Oral BID    DULoxetine (CYMBALTA) capsule 30 mg  30 mg Oral DAILY    atenoloL (TENORMIN) tablet 50 mg  50 mg Oral DAILY    traMADoL (ULTRAM) tablet 50 mg  50 mg Oral Q6H PRN    sodium chloride (NS) flush 5-40 mL  5-40 mL IntraVENous PRN    acetaminophen (TYLENOL) tablet 650 mg  650 mg Oral Q6H PRN    Or    acetaminophen (TYLENOL) suppository 650 mg  650 mg Rectal Q6H PRN    polyethylene glycol (MIRALAX) packet 17 g  17 g Oral DAILY PRN    ondansetron (ZOFRAN ODT) tablet 4 mg  4 mg Oral Q8H PRN    Or    ondansetron (ZOFRAN) injection 4 mg  4 mg IntraVENous Q6H PRN    guaiFENesin-dextromethorphan (ROBITUSSIN DM) 100-10 mg/5 mL syrup 5 mL  5 mL Oral Q4H PRN    benzonatate (TESSALON) capsule 100 mg  100 mg Oral TID PRN    baricitinib (OLUMIANT) tablet 4 mg  4 mg Oral DAILY    enoxaparin (LOVENOX) injection 30 mg  30 mg SubCUTAneous Q12H    diclofenac (VOLTAREN) 1 % topical gel 1-2 g  1-2 g Topical TID PRN    sodium chloride (NS) flush 5-10 mL  5-10 mL IntraVENous Q8H    sodium chloride (NS) flush 5-10 mL  5-10 mL IntraVENous PRN       Signed:  Jarod Garay MD

## 2021-12-22 NOTE — PROGRESS NOTES
Bedside report received from Pattie Morel RN. No distress on 13L HF. Instructed pt to call should needs arise. Pt voiced understanding. Call light within reach.

## 2021-12-22 NOTE — PROGRESS NOTES
Patient assisted to bsc from chair and then back to bed  Tolerated well  Sat didn't drop below 89%  Recovered at 94%  Oxygen at 14L via high flow

## 2021-12-22 NOTE — PROGRESS NOTES
MSN, CM:  Patient currently on 14L NC this AM.  Patient will need HH per PT/OT when medically stable for discharge. Case Management will continue to follow.

## 2021-12-23 ENCOUNTER — HOME HEALTH ADMISSION (OUTPATIENT)
Dept: HOME HEALTH SERVICES | Facility: HOME HEALTH | Age: 56
End: 2021-12-23
Payer: COMMERCIAL

## 2021-12-23 LAB
ALBUMIN SERPL-MCNC: 2.4 G/DL (ref 3.5–5)
ALBUMIN/GLOB SERPL: 0.7 {RATIO} (ref 1.2–3.5)
ALP SERPL-CCNC: 51 U/L (ref 50–136)
ALT SERPL-CCNC: 45 U/L (ref 12–65)
ANION GAP SERPL CALC-SCNC: 4 MMOL/L (ref 7–16)
AST SERPL-CCNC: 19 U/L (ref 15–37)
BILIRUB SERPL-MCNC: 0.4 MG/DL (ref 0.2–1.1)
BUN SERPL-MCNC: 23 MG/DL (ref 6–23)
CALCIUM SERPL-MCNC: 8.8 MG/DL (ref 8.3–10.4)
CHLORIDE SERPL-SCNC: 101 MMOL/L (ref 98–107)
CO2 SERPL-SCNC: 32 MMOL/L (ref 21–32)
CREAT SERPL-MCNC: 0.67 MG/DL (ref 0.6–1)
GLOBULIN SER CALC-MCNC: 3.3 G/DL (ref 2.3–3.5)
GLUCOSE SERPL-MCNC: 81 MG/DL (ref 65–100)
POTASSIUM SERPL-SCNC: 4.4 MMOL/L (ref 3.5–5.1)
PROT SERPL-MCNC: 5.7 G/DL (ref 6.3–8.2)
SODIUM SERPL-SCNC: 137 MMOL/L (ref 136–145)

## 2021-12-23 PROCEDURE — 74011250637 HC RX REV CODE- 250/637: Performed by: EMERGENCY MEDICINE

## 2021-12-23 PROCEDURE — 74011250636 HC RX REV CODE- 250/636: Performed by: STUDENT IN AN ORGANIZED HEALTH CARE EDUCATION/TRAINING PROGRAM

## 2021-12-23 PROCEDURE — 80053 COMPREHEN METABOLIC PANEL: CPT

## 2021-12-23 PROCEDURE — 97530 THERAPEUTIC ACTIVITIES: CPT

## 2021-12-23 PROCEDURE — 94640 AIRWAY INHALATION TREATMENT: CPT

## 2021-12-23 PROCEDURE — 97535 SELF CARE MNGMENT TRAINING: CPT

## 2021-12-23 PROCEDURE — 74011250637 HC RX REV CODE- 250/637: Performed by: INTERNAL MEDICINE

## 2021-12-23 PROCEDURE — 74011250637 HC RX REV CODE- 250/637: Performed by: STUDENT IN AN ORGANIZED HEALTH CARE EDUCATION/TRAINING PROGRAM

## 2021-12-23 PROCEDURE — 74011250636 HC RX REV CODE- 250/636: Performed by: INTERNAL MEDICINE

## 2021-12-23 PROCEDURE — 36415 COLL VENOUS BLD VENIPUNCTURE: CPT

## 2021-12-23 PROCEDURE — 65270000029 HC RM PRIVATE

## 2021-12-23 PROCEDURE — 77010033678 HC OXYGEN DAILY

## 2021-12-23 RX ADMIN — ENOXAPARIN SODIUM 30 MG: 100 INJECTION SUBCUTANEOUS at 09:06

## 2021-12-23 RX ADMIN — ALBUTEROL SULFATE 2 PUFF: 90 AEROSOL, METERED RESPIRATORY (INHALATION) at 19:48

## 2021-12-23 RX ADMIN — Medication 10 ML: at 12:17

## 2021-12-23 RX ADMIN — ATENOLOL 50 MG: 50 TABLET ORAL at 09:06

## 2021-12-23 RX ADMIN — Medication 10 ML: at 06:09

## 2021-12-23 RX ADMIN — ALBUTEROL SULFATE 2 PUFF: 90 AEROSOL, METERED RESPIRATORY (INHALATION) at 14:16

## 2021-12-23 RX ADMIN — ENOXAPARIN SODIUM 30 MG: 100 INJECTION SUBCUTANEOUS at 21:59

## 2021-12-23 RX ADMIN — Medication 10 ML: at 21:59

## 2021-12-23 RX ADMIN — FAMOTIDINE 20 MG: 20 TABLET ORAL at 09:06

## 2021-12-23 RX ADMIN — DULOXETINE HYDROCHLORIDE 30 MG: 30 CAPSULE, DELAYED RELEASE ORAL at 09:06

## 2021-12-23 RX ADMIN — FAMOTIDINE 20 MG: 20 TABLET ORAL at 16:38

## 2021-12-23 RX ADMIN — ALBUTEROL SULFATE 2 PUFF: 90 AEROSOL, METERED RESPIRATORY (INHALATION) at 01:19

## 2021-12-23 RX ADMIN — DEXAMETHASONE SODIUM PHOSPHATE 10 MG: 10 INJECTION, SOLUTION INTRAMUSCULAR; INTRAVENOUS at 09:06

## 2021-12-23 RX ADMIN — ALBUTEROL SULFATE 2 PUFF: 90 AEROSOL, METERED RESPIRATORY (INHALATION) at 07:22

## 2021-12-23 RX ADMIN — BARICITINIB 4 MG: 2 TABLET, FILM COATED ORAL at 09:05

## 2021-12-23 NOTE — PROGRESS NOTES
ACUTE PHYSICAL THERAPY GOALS:  (Developed with and agreed upon by patient and/or caregiver.)  (1.)Ms. Ceci Cheema will move from supine to sit and sit to supine , scoot up and down and roll side to side in bed with MODIFIED INDEPENDENCE within 7 treatment day(s). goal met  21  (2.)Ms. Ceci Cheema will transfer from bed to chair and chair to bed with MODIFIED INDEPENDENCE using the least restrictive device within 7 treatment day(s). goal met 1721  (3.)Ms. Ceci Cheema will ambulate with MODIFIED INDEPENDENCE for 100 feet with the least restrictive device within 7 treatment day(s). (4.)Ms. Ceci Cheema will participate in therapeutic activity/exercises x 24 minutes for increased activity tolerance with SpO2 above 90% within 7 treatment days. PHYSICAL THERAPY: Daily Note and PM Treatment Day # 2    Chelle Hale is a 64 y.o. female   PRIMARY DIAGNOSIS: Pneumonia due to COVID-19 virus  Pneumonia due to COVID-19 virus [U07.1, J12.82]         ASSESSMENT:     REHAB RECOMMENDATIONS: CURRENT LEVEL OF FUNCTION:  (Most Recently Demonstrated)   Recommendation to date pending progress:  [address] [place] [address]  Equipment:    To Be Determined Bed Mobility:   Independent  Sit to Stand:   Supervision  Transfers:   Supervision  Gait/Mobility:   Supervision     ASSESSMENT:  Ms. Ceci Cheema continues to make slow progress toward goals. She presented in supine and sounded very winded/raspy stating RT just decreased her O2 level. O2 sats are good and remain at or above 88% throughout treatment. Increased gait distances with use of RW for energy conservation only. 20' and 10'x3 w/ RW and minimal cueing for pacing.   Pursed lipped breathing during seated rest.      SUBJECTIVE:   Ms. Ceci Cheema states, \"I can't get any sleep\"    SOCIAL HISTORY/ LIVING ENVIRONMENT: Lives with room mates and prior to getting sick pt was   independent with ambulation; recently getting help from room mates for ambulating  Home Environment: Private residence  One/Two Story Residence: One story  Living Alone: No  Support Systems: Friend/Neighbor  OBJECTIVE:     PAIN: VITAL SIGNS: LINES/DRAINS:   Pre Treatment: Pain Screen  Pain Scale 1: Numeric (0 - 10)  Pain Intensity 1: 0/10  Post Treatment: 0/10     O2 Device: Hi flow nasal cannula 10L     MOBILITY: I Mod I S SBA CGA Min Mod Max Total  NT x2 Comments:   Bed Mobility    Rolling [] [] [] [] [] [] [] [] [] [x] []    Supine to Sit [x] [] [] [] [] [] [] [] [] [] []    Scooting [x] [] [] [] [] [] [] [] [] [] []    Sit to Supine [] [] [] [] [] [] [] [] [] [x] []    Transfers    Sit to Stand [] [] [x] [] [] [] [] [] [] [] []    Bed to Chair [] [] [x] [x] [] [] [] [] [] [] []    Stand to Sit [] [] [x] [] [] [] [] [] [] [] []    I=Independent, Mod I=Modified Independent, S=Supervision, SBA=Standby Assistance, CGA=Contact Guard Assistance,   Min=Minimal Assistance, Mod=Moderate Assistance, Max=Maximal Assistance, Total=Total Assistance, NT=Not Tested    BALANCE: Good Fair+ Fair Fair- Poor NT Comments   Sitting Static [x] [] [] [] [] []    Sitting Dynamic [x] [] [] [] [] []              Standing Static [x] [] [] [] [] []    Standing Dynamic [x] [x] [] [] [] []      GAIT: I Mod I S SBA CGA Min Mod Max Total  NT x2 Comments:   Level of Assistance [] [] [x] [x] [] [] [] [] [] [x] [] Cristel Budd for energy saving   Distance 20' and 10' x3    DME Rolling Walker    Gait Quality Steady, no LOB    Weightbearing  Status N/A     I=Independent, Mod I=Modified Independent, S=Supervision, SBA=Standby Assistance, CGA=Contact Guard Assistance,   Min=Minimal Assistance, Mod=Moderate Assistance, Max=Maximal Assistance, Total=Total Assistance, NT=Not Tested    PLAN:   FREQUENCY/DURATION: PT Plan of Care: 3 times/week for duration of hospital stay or until stated goals are met, whichever comes first.  TREATMENT:     TREATMENT:   ($$ Therapeutic Activity: 23-37 mins    )  Therapeutic Activity (25 Minutes):  Therapeutic activity included Rolling, Supine to Sit, Sit to Supine, Scooting, Transfer Training, Ambulation on level ground, Sitting balance  and Standing balance to improve functional Mobility, Strength and Activity tolerance.       TREATMENT GRID:   Date:  12/20 Date:   Date:     Activity/Exercise Parameters Parameters Parameters   Heel/toe taps x10     marching x10     LAQ x10     Shoulder flexion x10     Shoulder horizontal abd x10                       AFTER TREATMENT POSITION/PRECAUTIONS:  Chair, Needs within reach and RN notified    INTERDISCIPLINARY COLLABORATION:  RN/PCT and PT/PTA    TOTAL TREATMENT DURATION:  PT Patient Time In/Time Out  Time In: 1427  Time Out: 113 Milka Garner, PTA

## 2021-12-23 NOTE — PROGRESS NOTES
Pt resting in bed comfortably at this time, alert and oriented times 4. No distress noted, respirations even and unlabored on 8 L NC high flow. Pt denies pain at this time. Pt instructed to call for assistance if needed, call light in place, will continue to monitor.

## 2021-12-23 NOTE — PROGRESS NOTES
Pt in bed resting on 8L HF, in no apparent distress, bed in lowest, locked positions, call light in reach, SBAR given to [facility].

## 2021-12-23 NOTE — PROGRESS NOTES
ACUTE OT GOALS:  (Developed with and agreed upon by patient and/or caregiver.)  1. Patient will complete lower body bathing and dressing with Mod I and adaptive equipment as needed. Progressing, SBA, 2021  2. Patient will complete toileting with Mod I and adaptive equipment as needed. Progressing, SBA, 2021  3. Patient will tolerate 23 minutes of OT treatment with 1-2 rest breaks to increase activity tolerance for ADLs. Will continue to address  4. Patient will complete functional transfers with Mod I and adaptive equipment as needed. Progressing, SBA, 2021  5. Patient will complete standing grooming ADL with Mod I and seated rest breaks as needed. Progressing, SBA, 2021  6. Patient will verbalize and demonstrate at least 3 energy conservation techniques to increase activity tolerance for ADLs. Progressing, 2/3 techniques demonstrated, 2021    New goal as of Re-evaluation on 2021:   1. Patient will complete BUE exercises to increase strength in BUE for performance of ADLs and functional transfers. Timeframe: 7 visits     OCCUPATIONAL THERAPY ASSESSMENT: Daily Note, Re-evaluation and PM OT Treatment Day # 1    Charmaine Ruiz is a 64 y.o. female   PRIMARY DIAGNOSIS: Pneumonia due to COVID-19 virus  Pneumonia due to COVID-19 virus [U07.1, J12.82]       Reason for Referral:  Generalized Weakness  ICD-10: Treatment Diagnosis: Generalized Muscle Weakness (M62.81)  Difficulty in walking, Not elsewhere classified (R26.2)  INPATIENT: Payor: BLUE CROSS / Plan: [place] [facility] / Product Type: PPO /   ASSESSMENT:     REHAB RECOMMENDATIONS:   Recommendation to date pending progress:  [address] [place] [address] Therapy  Equipment:    To Be Determined. Pt has SC.      PRIOR LEVEL OF FUNCTION:  (Prior to Hospitalization)  INITIAL/CURRENT LEVEL OF FUNCTION:  (Based on today's evaluation) Bathing:   Independent  Dressing:   Independent  Feeding/Grooming:   Independent  Toileting:   Independent  Functional Mobility:   Independent Bathing:   Standby Assistance  Dressing:   Standby Assistance  Feeding/Grooming:   Standby Assistance standing at sinkside. Toileting:  [employer] Assistance  Functional Mobility:  Cruzfredrick Traore no use of DME. ASSESSMENT:  Ms. Yana Banks seen for re-evaluation due to today being 6th treatment visit. Goals above reviewed and remain appropriate, with one new goal added, thus no re-evaluation charged this visit and pt billed for treatment time only. Continues to present with deficits in overall strength, balance, and activity tolerance for performance of ADLs and functional mobility, but presents with good progress towards activity tolerance, independence for functional ADLs/mobility, and increased demonstration of energy conservation techniques. Received on 7L O2 with pt seated on BSC and O2 sats 93%. Pt requires SBA to complete wiping in seated and SBA x BHR to complete sit <> stand and clothing management in standing. Requires SBA to complete SPT from [facility] <> bed for seated rest break. Requires SBA to complete sit <> stand and walk from bed <> sink and SBA to wash hands. Desaturates to mid [de-identified]. BSC placed posterior to pt and pt requires SBA to complete stand <> sit for rest break. Requires SBA to complete sit <> stand and SBA to begin brushing teeth in standing. Pt observed to desaturate to 83% and requires return to seated for rest break. O2 sats improved to 98% following increased rest and pt completes remainder of grooming ADL in seated with O2 sats in low 90s to conserve energy for return to chair. Following additional rest break, pt requires SBA x BHR to complete sit <> stand and SBA to walk from sink <> recliner chair and complete stand <> sit.  Pt desaturates to highs 80s with return to chair, but improves to 95% with use of pursed-lip breathing technique and increased rest break. Pt would benefit from continued skilled OT to increase independence and safety for performance of ADLs and functional mobility. SUBJECTIVE:   Ms. Pratima Diaz states, \"I have really been enjoying the shampoo caps. \" In reference to receiving shampoo cap to wash hair. SOCIAL HISTORY/LIVING ENVIRONMENT: Lives with two roommates who work during the day in one story home with a couple of JUAN. Independent for ADLs and functional mobility. Working in manual labor position. Pt niece has brought a SC to her home. Home Environment: Private residence  One/Two Story Residence: One story  Living Alone: No  Support Systems: Friend/Neighbor    OBJECTIVE:     PAIN: VITAL SIGNS: LINES/DRAINS:   Pre Treatment: Pain Screen  Pain Scale 1: Numeric (0 - 10)  Pain Intensity 1: 0  Post Treatment: Unchanged Vital Signs  O2 Sat (%): 95 %  O2 Device: Nasal cannula  O2 Flow Rate (L/min): 7 l/min Continuous Pulse Oximetry  O2 Device: Nasal cannula     GROSS EVALUATION:  BUE Within Functional Limits Abnormal/ Functional Abnormal/ Non-Functional (see comments) Not Tested Comments:   AROM [x] [] [] []    PROM [] [] [] [x]    Strength [] [x] [] [] BUE with deficits in LUE > than R but progressing.     Balance [] [x] [] []    Posture [] [x] [] []    Sensation [x] [] [] []    Coordination [x] [] [] []    Tone [] [] [] [x]    Edema [x] [] [] []    Activity Tolerance [] [x] [] [] Pt still on nasal cannula but on decreased amount of O2 compared to last treatment session.    [] [] [] []      COGNITION/  PERCEPTION: Intact Impaired   (see comments) Comments:   Orientation [x] []    Vision [x] []    Hearing [x] []    Judgment/ Insight [x] []    Attention [x] []    Memory [x] []    Command Following [x] []    Emotional Regulation [x] []     [] []      ACTIVITIES OF DAILY LIVING: I Mod I S SBA CGA Min Mod Max Total NT Comments   BASIC ADLs:              Bathing/ Showering [] [] [] [] [] [] [] [] [] [x]    Toileting [] [] [] [x] [] [] [] [] [] [] Clothing management in preparation for/following toileting, wiping, and transfers. Dressing [] [] [] [x] [] [] [] [] [] [] LB clothing management for toileting. Feeding [] [] [] [] [] [] [] [] [] [x]    Grooming [] [] [] [x] [] [] [] [] [] [] To brush teeth and wash hands at sink. Personal Device Care [] [] [] [] [] [] [] [] [] [x]    Functional Mobility [] [] [] [x] [] [] [] [] [] [] SBA no use of DME. I=Independent, Mod I=Modified Independent, S=Supervision, SBA=Standby Assistance, CGA=Contact Guard Assistance,   Min=Minimal Assistance, Mod=Moderate Assistance, Max=Maximal Assistance, Total=Total Assistance, NT=Not Tested    MOBILITY: I Mod I S SBA CGA Min Mod Max Total  NT x2 Comments:   Supine to sit [] [] [] [] [] [] [] [] [] [x] [] Received seated on BSC. Sit to supine [] [] [] [] [] [] [] [] [] [x] []    Sit to stand [] [] [] [x] [] [] [] [] [] [] []    Bed to chair [] [] [] [x] [] [] [] [] [] [] [] No use of DME. I=Independent, Mod I=Modified Independent, S=Supervision, SBA=Standby Assistance, CGA=Contact Guard Assistance,   Min=Minimal Assistance, Mod=Moderate Assistance, Max=Maximal Assistance, Total=Total Assistance, NT=Not [place] [address]   Daily Activity Inpatient Short Form        How much help from another person does the patient currently need. .. Total A Lot A Little None   1. Putting on and taking off regular lower body clothing? [] 1   [] 2   [x] 3   [] 4   2. Bathing (including washing, rinsing, drying)? [] 1   [] 2   [x] 3   [] 4   3. Toileting, which includes using toilet, bedpan or urinal?   [] 1   [] 2   [x] 3   [] 4   4. Putting on and taking off regular upper body clothing? [] 1   [] 2   [x] 3   [] 4   5. Taking care of personal grooming such as brushing teeth? [] 1   [] 2   [x] 3   [] 4   6. Eating meals? [] 1   [] 2   [] 3   [x] 4   © 2007, Trustees of [address] [place] [address] [address] [address], under license to [facility].  All rights reserved     Score:  Initial: 19, completed, 12/13/2021 Most Recent: 19, completed, (Date: 12/23/2021 )   Interpretation of Tool:  Represents activities that are increasingly more difficult (i.e. Bed mobility, Transfers, Gait). PLAN:   FREQUENCY/DURATION: OT Plan of Care: 3 times/week for duration of hospital stay or until stated goals are met, whichever comes first.    PROBLEM LIST:   (Skilled intervention is medically necessary to address:)  1. Decreased ADL/Functional Activities  2. Decreased Activity Tolerance  3. Decreased Balance  4. Decreased Gait Ability  5. Decreased Strength  6. Decreased Transfer Abilities   INTERVENTIONS PLANNED:   (Benefits and precautions of occupational therapy have been discussed with the patient.)  1. Self Care Training  2. Therapeutic Activity  3. Therapeutic Exercise/HEP  4. Neuromuscular Re-education  5. Education     TREATMENT:     EVALUATION: Re-evaluation completed but not charged as initial goals remain appropriate. (Untimed Charge)    TREATMENT:   Self Care (23 Minutes): Self care including Toileting, Lower Body Dressing, Grooming and Energy Conservation Training to increase independence, decrease level of assistance required and increase activity tolerance. .    TREATMENT GRID:  N/A    AFTER TREATMENT POSITION/PRECAUTIONS:  Chair, Needs within reach, RN notified and table tray anterior to pt.     INTERDISCIPLINARY COLLABORATION:  RN/PCT, PT/PTA and OT/LEE    TOTAL TREATMENT DURATION:  OT Patient Time In/Time Out  Time In: 1550  Time Out: 1613    Dayanara Chapman OTR/L

## 2021-12-23 NOTE — PROGRESS NOTES
Pt resting in bed comfortably at this time, alert and oriented times 4. Pt on 6 L NC high flow. Pt denies pain at this time. Pt instructed to call for assistance if needed, call light in place, will continue to monitor. Will prepare for bedside shift report.

## 2021-12-23 NOTE — PROGRESS NOTES
Hospitalist Progress Note   Admit Date:  2021 10:33 PM   Name:  Shawn Padilla   Age:  64 y.o. Sex:  female  :  1965   MRN:  [id number]   Room:  [facility]    Presenting Complaint: Positive For Covid-19    Reason(s) for Admission: Pneumonia due to COVID-19 virus [U07.1, J12.82]     Hospital Course & Interval History:   66-year-old female with past medical history of psoriatic arthritis, fibromyalgia, hypertension who was admitted on  with acute hypoxemic respiratory failure secondary to COVID-19. Patient was initially on a few liters of oxygen nasal cannula and then increased to 4 L on . The following day patient was then up to 10 L. CT of the chest negative for pulmonary embolism but did show bilateral infiltrates. Patient currently on 8 L, dexamethasone. Subjective (21): Patient was seen and examined at the bedside. Patient this morning feeling weak and short of breath. Patient denied any other complaints. Patient did state that she felt like doing was resting today. Patient denied any cardiac chest pain, fever, abdominal pain. Assessment & Plan:   Pneumonia due to COVID-19 virus (2021)  Patient was septic on admission with fever, tachycardia, leukocytosis, infiltrate on chest x-ray  Sepsis now currently resolved  CT chest showed bilateral infiltrates, no pulmonary embolism  Patient continues to desaturate with exertion to low 80s when she gets out of bed  Dexamethasone daily  Baricitinib end of treatment   Patient still currently on 8 L high flow  Patient is not at goal. Respiratory status still critical at this time    Fibromyalgia  Continue Cymbalta    Hypertension  Continue atenolol      Dispo/Discharge Planning:    Continue to wean oxygen. Dispo pending clinical course PT recommending home health. Hopefully patient begins to improve next 48 to 72 hours.     Diet:  ADULT ORAL NUTRITION SUPPLEMENT Breakfast, Lunch, Dinner; Standard High Calorie/High Protein  ADULT ORAL NUTRITION SUPPLEMENT Breakfast, Lunch, Dinner; Frozen Supplement  ADULT DIET Regular; daily breakfast: toast with jelly and galan; daily lunch/dinner: fruit and sandwich. Chocolate magic cup with all meals. No mixed berry.   DVT PPx: Lovenox  Code status: Full Code    Hospital Problems as of 12/23/2021 Date Reviewed: 12/12/2021          Codes Class Noted - Resolved POA    * (Principal) Pneumonia due to COVID-19 virus ICD-10-CM: U07.1, J12.82  ICD-9-CM: 480.8, 079.89  12/12/2021 - Present Yes        Psoriatic arthritis ([facility] ICD-10-CM: L40.50  ICD-9-CM: 696.0  12/12/2021 - Present Yes        Primary hypertension ICD-10-CM: I10  ICD-9-CM: 401.9  5/24/2013 - Present Yes        Fibromyalgia ICD-10-CM: M79.7  ICD-9-CM: 729.1  5/24/2013 - Present Yes        RESOLVED: Sepsis ([facility] ICD-10-CM: A41.9  ICD-9-CM: 038.9, 995.91  12/15/2021 - 12/15/2021 Yes        RESOLVED: Thrombocytopenia ([facility] ICD-10-CM: D69.6  ICD-9-CM: 287.5  12/15/2021 - 12/15/2021 Unknown        RESOLVED: Diarrhea ICD-10-CM: R19.7  ICD-9-CM: 787.91  12/15/2021 - 12/15/2021 Unknown              Objective:     Patient Vitals for the past 24 hrs:   Temp Pulse Resp BP SpO2   12/23/21 1140 98.2 °F (36.8 °C) 78 20 101/65 94 %   12/23/21 0745 97.9 °F (36.6 °C) 66 20 109/70 91 %   12/23/21 0722     96 %   12/23/21 0414 97.9 °F (36.6 °C) 68 18 115/64 96 %   12/23/21 0119     93 %   12/22/21 2228 98.2 °F (36.8 °C) 70 18 116/66 93 %   12/22/21 1942 97.9 °F (36.6 °C) 90 22 103/60 92 %   12/22/21 1938     92 %   12/22/21 1458 97.7 °F (36.5 °C) (!) 59 21 133/75 91 %   12/22/21 1428     98 %     Oxygen Therapy  O2 Sat (%): 94 % (12/23/21 1140)  Pulse via Oximetry: 78 beats per minute (12/23/21 0722)  O2 Device: Hi flow nasal cannula (12/23/21 0722)  O2 Flow Rate (L/min): 8 l/min (Decreased to 7 L/m) (12/23/21 0722)  FIO2 (%): 94 % (12/22/21 0730)    Estimated body mass index is 34.38 kg/m² as calculated from the following:    Height as of this encounter: 5' 6\" (1.676 m). Weight as of this encounter: 96.6 kg (213 lb). Intake/Output Summary (Last 24 hours) at 12/23/2021 1354  Last data filed at 12/23/2021 1230  Gross per 24 hour   Intake 360 ml   Output    Net 360 ml         Physical Exam:   Blood pressure 101/65, pulse 78, temperature 98.2 °F (36.8 °C), resp. rate 20, height 5' 6\" (1.676 m), weight 96.6 kg (213 lb), last menstrual period 11/02/2014, SpO2 94 %, not currently breastfeeding. General:    Well nourished. Mild respiratory distress  Head:  Normocephalic, atraumatic  Eyes:  Sclerae appear normal.  Pupils equally round. ENT:  Nares appear normal, no drainage. Moist oral mucosa  Neck:  No restricted ROM. Trachea midline   CV:   RRR. No m/r/g. No jugular venous distension. Lungs:   Coarse breath sounds bilaterally. Respirations even, unlabored  Abdomen: Bowel sounds present. Soft, nontender, nondistended. Extremities: No cyanosis or clubbing. No edema  Skin:     No rashes and normal coloration. Warm and dry. Neuro:  CN II-XII grossly intact. Sensation intact. A&Ox3  Psych:  Normal mood and affect. I have reviewed ordered lab tests and independently visualized imaging below:    Recent Labs:  Recent Results (from the past 48 hour(s))   METABOLIC PANEL, COMPREHENSIVE    Collection Time: 12/23/21  6:21 AM   Result Value Ref Range    Sodium 137 136 - 145 mmol/L    Potassium 4.4 3.5 - 5.1 mmol/L    Chloride 101 98 - 107 mmol/L    CO2 32 21 - 32 mmol/L    Anion gap 4 (L) 7 - 16 mmol/L    Glucose 81 65 - 100 mg/dL    BUN 23 6 - 23 MG/DL    Creatinine 0.67 0.6 - 1.0 MG/DL    GFR est AA >60 >60 ml/min/1.73m2    GFR est non-AA >60 >60 ml/min/1.73m2    Calcium 8.8 8.3 - 10.4 MG/DL    Bilirubin, total 0.4 0.2 - 1.1 MG/DL    ALT (SGPT) 45 12 - 65 U/L    AST (SGOT) 19 15 - 37 U/L    Alk.  phosphatase 51 50 - 136 U/L    Protein, total 5.7 (L) 6.3 - 8.2 g/dL    Albumin 2.4 (L) 3.5 - 5.0 g/dL    Globulin 3.3 2.3 - 3.5 g/dL    A-G Ratio 0.7 (L) 1.2 - 3.5         All Micro Results     Procedure Component Value Units Date/Time    BLOOD CULTURE [[id number]] Collected: 12/11/21 2256    Order Status: Completed Specimen: Blood Updated: 12/17/21 0731     Special Requests: NO SPECIAL REQUESTS        Culture result: NO GROWTH 5 DAYS       CULTURE, STOOL [[id number]] Collected: 12/12/21 1640    Order Status: Completed Specimen: Stool Updated: 12/15/21 0653     Special Requests: NO SPECIAL REQUESTS        Culture result:       No Salmonella, Shigella, or Ecoli 0157 isolated. OVA & PARASITES, STOOL [[id number]]     Order Status: Canceled Specimen: Feces from Stool     COVID-19 RAPID TEST [[id number]]  (Abnormal) Collected: 12/12/21 1346    Order Status: Completed Specimen: Nasopharyngeal Updated: 12/12/21 1411     Specimen source NASAL        Comment: RAPID ONLY        COVID-19 rapid test Detected        Comment: RESULTS VERIFIED, PHONED TO AND READ BACK BY  Jordyn Castillo RN ON 12/12/21 @1410, TA       The specimen is POSITIVE for SARS-CoV-2, the novel coronavirus associated with COVID-19. This test has been authorized by the FDA under an Emergency Use Authorization (EUA) for use by authorized laboratories. Fact sheet for Healthcare Providers: ConventionUpdate.co.nz  Fact sheet for Patients: ConventionUpdate.co.nz       Methodology: Isothermal Nucleic Acid Amplification         BLOOD CULTURE [[id number]]     Order Status: Canceled Specimen: Blood           Other Studies:  No results found.     Current Meds:  Current Facility-Administered Medications   Medication Dose Route Frequency    dexamethasone (DECADRON) 10 mg/mL injection 10 mg  10 mg IntraVENous Q24H    albuterol (PROVENTIL HFA, VENTOLIN HFA, PROAIR HFA) inhaler 2 Puff  2 Puff Inhalation Q6H RT    famotidine (PEPCID) tablet 20 mg  20 mg Oral BID    DULoxetine (CYMBALTA) capsule 30 mg  30 mg Oral DAILY    atenoloL (TENORMIN) tablet 50 mg  50 mg Oral DAILY    traMADoL (ULTRAM) tablet 50 mg  50 mg Oral Q6H PRN    sodium chloride (NS) flush 5-40 mL  5-40 mL IntraVENous PRN    acetaminophen (TYLENOL) tablet 650 mg  650 mg Oral Q6H PRN    Or    acetaminophen (TYLENOL) suppository 650 mg  650 mg Rectal Q6H PRN    polyethylene glycol (MIRALAX) packet 17 g  17 g Oral DAILY PRN    ondansetron (ZOFRAN ODT) tablet 4 mg  4 mg Oral Q8H PRN    Or    ondansetron (ZOFRAN) injection 4 mg  4 mg IntraVENous Q6H PRN    guaiFENesin-dextromethorphan (ROBITUSSIN DM) 100-10 mg/5 mL syrup 5 mL  5 mL Oral Q4H PRN    benzonatate (TESSALON) capsule 100 mg  100 mg Oral TID PRN    baricitinib (OLUMIANT) tablet 4 mg  4 mg Oral DAILY    enoxaparin (LOVENOX) injection 30 mg  30 mg SubCUTAneous Q12H    diclofenac (VOLTAREN) 1 % topical gel 1-2 g  1-2 g Topical TID PRN    sodium chloride (NS) flush 5-10 mL  5-10 mL IntraVENous Q8H    sodium chloride (NS) flush 5-10 mL  5-10 mL IntraVENous PRN       Signed:  Jose Phipps MD    Part of this note may have been written by using a voice dictation software. The note has been proof read but may still contain some grammatical/other typographical errors.

## 2021-12-23 NOTE — PROGRESS NOTES
Pt resting in bed comfortably at this time, alert and oriented times 4. No distress noted, respirations even and unlabored on 8 L NC HF. Pt denies pain at this time. Pt instructed to call for assistance if needed, call light in place, will continue to monitor.

## 2021-12-24 LAB
ALBUMIN SERPL-MCNC: 2.2 G/DL (ref 3.5–5)
ALBUMIN/GLOB SERPL: 0.6 {RATIO} (ref 1.2–3.5)
ALP SERPL-CCNC: 52 U/L (ref 50–136)
ALT SERPL-CCNC: 43 U/L (ref 12–65)
ANION GAP SERPL CALC-SCNC: 4 MMOL/L (ref 7–16)
AST SERPL-CCNC: 17 U/L (ref 15–37)
BILIRUB SERPL-MCNC: 0.4 MG/DL (ref 0.2–1.1)
BUN SERPL-MCNC: 21 MG/DL (ref 6–23)
CALCIUM SERPL-MCNC: 8.9 MG/DL (ref 8.3–10.4)
CHLORIDE SERPL-SCNC: 101 MMOL/L (ref 98–107)
CO2 SERPL-SCNC: 33 MMOL/L (ref 21–32)
CREAT SERPL-MCNC: 0.71 MG/DL (ref 0.6–1)
ERYTHROCYTE [DISTWIDTH] IN BLOOD BY AUTOMATED COUNT: 12 % (ref 11.9–14.6)
GLOBULIN SER CALC-MCNC: 3.4 G/DL (ref 2.3–3.5)
GLUCOSE SERPL-MCNC: 80 MG/DL (ref 65–100)
HCT VFR BLD AUTO: 41.1 % (ref 35.8–46.3)
HGB BLD-MCNC: 13.3 G/DL (ref 11.7–15.4)
MAGNESIUM SERPL-MCNC: 2.5 MG/DL (ref 1.8–2.4)
MCH RBC QN AUTO: 27.9 PG (ref 26.1–32.9)
MCHC RBC AUTO-ENTMCNC: 32.4 G/DL (ref 31.4–35)
MCV RBC AUTO: 86.2 FL (ref 79.6–97.8)
NRBC # BLD: 0 K/UL (ref 0–0.2)
PLATELET # BLD AUTO: 483 K/UL (ref 150–450)
PMV BLD AUTO: 9.9 FL (ref 9.4–12.3)
POTASSIUM SERPL-SCNC: 4.6 MMOL/L (ref 3.5–5.1)
PROT SERPL-MCNC: 5.6 G/DL (ref 6.3–8.2)
RBC # BLD AUTO: 4.77 M/UL (ref 4.05–5.2)
SODIUM SERPL-SCNC: 138 MMOL/L (ref 136–145)
WBC # BLD AUTO: 12.9 K/UL (ref 4.3–11.1)

## 2021-12-24 PROCEDURE — 74011250637 HC RX REV CODE- 250/637: Performed by: INTERNAL MEDICINE

## 2021-12-24 PROCEDURE — 65270000029 HC RM PRIVATE

## 2021-12-24 PROCEDURE — 36415 COLL VENOUS BLD VENIPUNCTURE: CPT

## 2021-12-24 PROCEDURE — 74011250637 HC RX REV CODE- 250/637: Performed by: STUDENT IN AN ORGANIZED HEALTH CARE EDUCATION/TRAINING PROGRAM

## 2021-12-24 PROCEDURE — 94640 AIRWAY INHALATION TREATMENT: CPT

## 2021-12-24 PROCEDURE — 94762 N-INVAS EAR/PLS OXIMTRY CONT: CPT

## 2021-12-24 PROCEDURE — 80053 COMPREHEN METABOLIC PANEL: CPT

## 2021-12-24 PROCEDURE — 77010033678 HC OXYGEN DAILY

## 2021-12-24 PROCEDURE — 83735 ASSAY OF MAGNESIUM: CPT

## 2021-12-24 PROCEDURE — 74011250636 HC RX REV CODE- 250/636: Performed by: STUDENT IN AN ORGANIZED HEALTH CARE EDUCATION/TRAINING PROGRAM

## 2021-12-24 PROCEDURE — 85027 COMPLETE CBC AUTOMATED: CPT

## 2021-12-24 PROCEDURE — 74011250637 HC RX REV CODE- 250/637: Performed by: EMERGENCY MEDICINE

## 2021-12-24 PROCEDURE — 74011250636 HC RX REV CODE- 250/636: Performed by: INTERNAL MEDICINE

## 2021-12-24 RX ADMIN — BARICITINIB 4 MG: 2 TABLET, FILM COATED ORAL at 09:13

## 2021-12-24 RX ADMIN — ALBUTEROL SULFATE 2 PUFF: 90 AEROSOL, METERED RESPIRATORY (INHALATION) at 08:00

## 2021-12-24 RX ADMIN — Medication 10 ML: at 21:42

## 2021-12-24 RX ADMIN — Medication 10 ML: at 06:14

## 2021-12-24 RX ADMIN — ALBUTEROL SULFATE 2 PUFF: 90 AEROSOL, METERED RESPIRATORY (INHALATION) at 01:45

## 2021-12-24 RX ADMIN — ATENOLOL 50 MG: 50 TABLET ORAL at 09:13

## 2021-12-24 RX ADMIN — ENOXAPARIN SODIUM 30 MG: 100 INJECTION SUBCUTANEOUS at 21:42

## 2021-12-24 RX ADMIN — ALBUTEROL SULFATE 2 PUFF: 90 AEROSOL, METERED RESPIRATORY (INHALATION) at 14:00

## 2021-12-24 RX ADMIN — ALBUTEROL SULFATE 2 PUFF: 90 AEROSOL, METERED RESPIRATORY (INHALATION) at 20:04

## 2021-12-24 RX ADMIN — FAMOTIDINE 20 MG: 20 TABLET ORAL at 09:14

## 2021-12-24 RX ADMIN — DULOXETINE HYDROCHLORIDE 30 MG: 30 CAPSULE, DELAYED RELEASE ORAL at 09:13

## 2021-12-24 RX ADMIN — Medication 10 ML: at 16:21

## 2021-12-24 RX ADMIN — FAMOTIDINE 20 MG: 20 TABLET ORAL at 17:15

## 2021-12-24 RX ADMIN — ENOXAPARIN SODIUM 30 MG: 100 INJECTION SUBCUTANEOUS at 09:13

## 2021-12-24 RX ADMIN — DEXAMETHASONE SODIUM PHOSPHATE 10 MG: 10 INJECTION, SOLUTION INTRAMUSCULAR; INTRAVENOUS at 09:13

## 2021-12-24 NOTE — PROGRESS NOTES
Hospitalist Progress Note   Admit Date:  2021 10:33 PM   Name:  King Domi   Age:  64 y.o. Sex:  female  :  1965   MRN:  [id number]   Room:  [facility]/    Presenting Complaint: Positive For Covid-19    Reason(s) for Admission: Pneumonia due to COVID-19 virus [U07.1, J12.82]     Hospital Course & Interval History:   44-year-old female with past medical history of psoriatic arthritis, fibromyalgia, hypertension who was admitted on  with acute hypoxemic respiratory failure secondary to COVID-19. Patient was initially on a few liters of oxygen nasal cannula and then increased to 4 L on . The following day patient was then up to 10 L. CT of the chest negative for pulmonary embolism but did show bilateral infiltrates. Patient currently on 8 L, dexamethasone. Subjective (21):  Patient was seen and examined at the bedside. Feeling much better this morning than she did yesterday. Patient was able to rest throughout the night. Patient denied any cardiac chest pain, fever, abdominal pain. Assessment & Plan:   Pneumonia due to COVID-19 virus (2021)  Patient was septic on admission with fever, tachycardia, leukocytosis, infiltrate on chest x-ray  Sepsis now currently resolved  CT chest showed bilateral infiltrates, no pulmonary embolism  Patient continues to desaturate with exertion to low 80s when she gets out of bed  Dexamethasone daily  Baricitinib end of treatment   On 6 L high flow down from 8 L  Patient continues to have oxygen saturation dropped into the low 80s with exertion and takes about a minute for her to recover back   patient is not at goal. Respiratory status still critical at this time    Fibromyalgia  Continue Cymbalta    Hypertension  Continue atenolol      Dispo/Discharge Planning:    Continue to wean oxygen. Dispo pending clinical course. PT recommending home health.      Diet:  ADULT ORAL NUTRITION SUPPLEMENT Breakfast, Lunch, Dinner; Standard High Calorie/High Protein  ADULT ORAL NUTRITION SUPPLEMENT Breakfast, Lunch, Dinner; Frozen Supplement  ADULT DIET Regular; daily breakfast: toast with jelly and galan; daily lunch/dinner: fruit and sandwich. Chocolate magic cup with all meals. No mixed berry.   DVT PPx: Lovenox  Code status: Full Code    Hospital Problems as of 12/24/2021 Date Reviewed: 12/12/2021          Codes Class Noted - Resolved POA    * (Principal) Pneumonia due to COVID-19 virus ICD-10-CM: U07.1, J12.82  ICD-9-CM: 480.8, 079.89  12/12/2021 - Present Yes        Psoriatic arthritis ([facility] ICD-10-CM: L40.50  ICD-9-CM: 696.0  12/12/2021 - Present Yes        Primary hypertension ICD-10-CM: I10  ICD-9-CM: 401.9  5/24/2013 - Present Yes        Fibromyalgia ICD-10-CM: M79.7  ICD-9-CM: 729.1  5/24/2013 - Present Yes        RESOLVED: Sepsis ([facility] ICD-10-CM: A41.9  ICD-9-CM: 038.9, 995.91  12/15/2021 - 12/15/2021 Yes        RESOLVED: Thrombocytopenia ([facility] ICD-10-CM: D69.6  ICD-9-CM: 287.5  12/15/2021 - 12/15/2021 Unknown        RESOLVED: Diarrhea ICD-10-CM: R19.7  ICD-9-CM: 787.91  12/15/2021 - 12/15/2021 Unknown              Objective:     Patient Vitals for the past 24 hrs:   Temp Pulse Resp BP SpO2   12/24/21 0911     91 %   12/24/21 0759 98 °F (36.7 °C) 64 20 110/65 98 %   12/24/21 0224 97.9 °F (36.6 °C) 69 18 117/63 96 %   12/24/21 0146     98 %   12/23/21 2216 98.2 °F (36.8 °C) 66 18 120/60 98 %   12/23/21 1948     98 %   12/23/21 1916 98.2 °F (36.8 °C) 73 18 112/61 97 %   12/23/21 1550     95 %   12/23/21 1514 98.2 °F (36.8 °C) 79 20 105/70 94 %   12/23/21 1416     93 %   12/23/21 1140 98.2 °F (36.8 °C) 78 20 101/65 94 %     Oxygen Therapy  O2 Sat (%): 91 % (12/24/21 0911)  Pulse via Oximetry: 61 beats per minute (12/24/21 0146)  O2 Device: Hi flow nasal cannula (12/24/21 0911)  O2 Flow Rate (L/min): 6 l/min (12/24/21 0911)  FIO2 (%): 93 % (12/23/21 1416)    Estimated body mass index is 34.22 kg/m² as calculated from the following:    Height as of this encounter: 5' 6\" (1.676 m). Weight as of this encounter: 96.2 kg (212 lb). Intake/Output Summary (Last 24 hours) at 12/24/2021 1035  Last data filed at 12/23/2021 1841  Gross per 24 hour   Intake 480 ml   Output    Net 480 ml         Physical Exam:   Blood pressure 110/65, pulse 64, temperature 98 °F (36.7 °C), resp. rate 20, height 5' 6\" (1.676 m), weight 96.2 kg (212 lb), last menstrual period 11/02/2014, SpO2 91 %, not currently breastfeeding. General:    Well nourished. Mild respiratory distress  Head:  Normocephalic, atraumatic  Eyes:  Sclerae appear normal.  Pupils equally round. ENT:  Nares appear normal, no drainage. Moist oral mucosa  Neck:  No restricted ROM. Trachea midline   CV:   RRR. No m/r/g. No jugular venous distension. Lungs:   Coarse breath sounds bilaterally. Respirations even, unlabored  Abdomen: Bowel sounds present. Soft, nontender, nondistended. Extremities: No cyanosis or clubbing. No edema  Skin:     No rashes and normal coloration. Warm and dry. Neuro:  CN II-XII grossly intact. Sensation intact. A&Ox3  Psych:  Normal mood and affect. I have reviewed ordered lab tests and independently visualized imaging below:    Recent Labs:  Recent Results (from the past 48 hour(s))   METABOLIC PANEL, COMPREHENSIVE    Collection Time: 12/23/21  6:21 AM   Result Value Ref Range    Sodium 137 136 - 145 mmol/L    Potassium 4.4 3.5 - 5.1 mmol/L    Chloride 101 98 - 107 mmol/L    CO2 32 21 - 32 mmol/L    Anion gap 4 (L) 7 - 16 mmol/L    Glucose 81 65 - 100 mg/dL    BUN 23 6 - 23 MG/DL    Creatinine 0.67 0.6 - 1.0 MG/DL    GFR est AA >60 >60 ml/min/1.73m2    GFR est non-AA >60 >60 ml/min/1.73m2    Calcium 8.8 8.3 - 10.4 MG/DL    Bilirubin, total 0.4 0.2 - 1.1 MG/DL    ALT (SGPT) 45 12 - 65 U/L    AST (SGOT) 19 15 - 37 U/L    Alk.  phosphatase 51 50 - 136 U/L    Protein, total 5.7 (L) 6.3 - 8.2 g/dL    Albumin 2.4 (L) 3.5 - 5.0 g/dL    Globulin 3.3 2.3 - 3.5 g/dL    A-G Ratio 0.7 (L) 1.2 - 3.5     CBC W/O DIFF    Collection Time: 12/24/21  7:01 AM   Result Value Ref Range    WBC 12.9 (H) 4.3 - 11.1 K/uL    RBC 4.77 4.05 - 5.2 M/uL    HGB 13.3 11.7 - 15.4 g/dL    HCT 41.1 35.8 - 46.3 %    MCV 86.2 79.6 - 97.8 FL    MCH 27.9 26.1 - 32.9 PG    MCHC 32.4 31.4 - 35.0 g/dL    RDW 12.0 11.9 - 14.6 %    PLATELET 900 (H) 583 - 450 K/uL    MPV 9.9 9.4 - 12.3 FL    ABSOLUTE NRBC 0.00 0.0 - 0.2 K/uL   METABOLIC PANEL, COMPREHENSIVE    Collection Time: 12/24/21  7:01 AM   Result Value Ref Range    Sodium 138 136 - 145 mmol/L    Potassium 4.6 3.5 - 5.1 mmol/L    Chloride 101 98 - 107 mmol/L    CO2 33 (H) 21 - 32 mmol/L    Anion gap 4 (L) 7 - 16 mmol/L    Glucose 80 65 - 100 mg/dL    BUN 21 6 - 23 MG/DL    Creatinine 0.71 0.6 - 1.0 MG/DL    GFR est AA >60 >60 ml/min/1.73m2    GFR est non-AA >60 >60 ml/min/1.73m2    Calcium 8.9 8.3 - 10.4 MG/DL    Bilirubin, total 0.4 0.2 - 1.1 MG/DL    ALT (SGPT) 43 12 - 65 U/L    AST (SGOT) 17 15 - 37 U/L    Alk. phosphatase 52 50 - 136 U/L    Protein, total 5.6 (L) 6.3 - 8.2 g/dL    Albumin 2.2 (L) 3.5 - 5.0 g/dL    Globulin 3.4 2.3 - 3.5 g/dL    A-G Ratio 0.6 (L) 1.2 - 3.5     MAGNESIUM    Collection Time: 12/24/21  7:01 AM   Result Value Ref Range    Magnesium 2.5 (H) 1.8 - 2.4 mg/dL       All Micro Results     Procedure Component Value Units Date/Time    BLOOD CULTURE [[id number]] Collected: 12/11/21 2256    Order Status: Completed Specimen: Blood Updated: 12/17/21 0731     Special Requests: NO SPECIAL REQUESTS        Culture result: NO GROWTH 5 DAYS       CULTURE, STOOL [[id number]] Collected: 12/12/21 1640    Order Status: Completed Specimen: Stool Updated: 12/15/21 0653     Special Requests: NO SPECIAL REQUESTS        Culture result:       No Salmonella, Shigella, or Ecoli 0157 isolated.           OVA & PARASITES, STOOL [[id number]]     Order Status: Canceled Specimen: Feces from Stool     COVID-19 RAPID TEST [[id number]]  (Abnormal) Collected: 12/12/21 1346    Order Status: Completed Specimen: Nasopharyngeal Updated: 12/12/21 1411     Specimen source NASAL        Comment: RAPID ONLY        COVID-19 rapid test Detected        Comment: RESULTS VERIFIED, PHONED TO AND READ BACK BY  Lisa Rao RN ON 12/12/21 @1410, TA       The specimen is POSITIVE for SARS-CoV-2, the novel coronavirus associated with COVID-19. This test has been authorized by the FDA under an Emergency Use Authorization (EUA) for use by authorized laboratories. Fact sheet for Healthcare Providers: kstattoo.com  Fact sheet for Patients: kstattoo.com       Methodology: Isothermal Nucleic Acid Amplification         BLOOD CULTURE [[id number]]     Order Status: Canceled Specimen: Blood           Other Studies:  No results found.     Current Meds:  Current Facility-Administered Medications   Medication Dose Route Frequency    dexamethasone (DECADRON) 10 mg/mL injection 10 mg  10 mg IntraVENous Q24H    albuterol (PROVENTIL HFA, VENTOLIN HFA, PROAIR HFA) inhaler 2 Puff  2 Puff Inhalation Q6H RT    famotidine (PEPCID) tablet 20 mg  20 mg Oral BID    DULoxetine (CYMBALTA) capsule 30 mg  30 mg Oral DAILY    atenoloL (TENORMIN) tablet 50 mg  50 mg Oral DAILY    traMADoL (ULTRAM) tablet 50 mg  50 mg Oral Q6H PRN    sodium chloride (NS) flush 5-40 mL  5-40 mL IntraVENous PRN    acetaminophen (TYLENOL) tablet 650 mg  650 mg Oral Q6H PRN    Or    acetaminophen (TYLENOL) suppository 650 mg  650 mg Rectal Q6H PRN    polyethylene glycol (MIRALAX) packet 17 g  17 g Oral DAILY PRN    ondansetron (ZOFRAN ODT) tablet 4 mg  4 mg Oral Q8H PRN    Or    ondansetron (ZOFRAN) injection 4 mg  4 mg IntraVENous Q6H PRN    guaiFENesin-dextromethorphan (ROBITUSSIN DM) 100-10 mg/5 mL syrup 5 mL  5 mL Oral Q4H PRN    benzonatate (TESSALON) capsule 100 mg  100 mg Oral TID PRN    baricitinib (OLUMIANT) tablet 4 mg  4 mg Oral DAILY  enoxaparin (LOVENOX) injection 30 mg  30 mg SubCUTAneous Q12H    diclofenac (VOLTAREN) 1 % topical gel 1-2 g  1-2 g Topical TID PRN    sodium chloride (NS) flush 5-10 mL  5-10 mL IntraVENous Q8H    sodium chloride (NS) flush 5-10 mL  5-10 mL IntraVENous PRN       Signed:  Zora Julian MD    Part of this note may have been written by using a voice dictation software. The note has been proof read but may still contain some grammatical/other typographical errors.

## 2021-12-24 NOTE — PROGRESS NOTES
Pt in bed resting on 6L HF, in no apparent distress, call light in reach, bed in lowest, locked position. SBAR given to Ramiro Lam RN.

## 2021-12-25 LAB
ALBUMIN SERPL-MCNC: 2.3 G/DL (ref 3.5–5)
ALBUMIN/GLOB SERPL: 0.7 {RATIO} (ref 1.2–3.5)
ALP SERPL-CCNC: 55 U/L (ref 50–136)
ALT SERPL-CCNC: 52 U/L (ref 12–65)
ANION GAP SERPL CALC-SCNC: 6 MMOL/L (ref 7–16)
AST SERPL-CCNC: 21 U/L (ref 15–37)
BILIRUB SERPL-MCNC: 0.4 MG/DL (ref 0.2–1.1)
BUN SERPL-MCNC: 18 MG/DL (ref 6–23)
CALCIUM SERPL-MCNC: 9.1 MG/DL (ref 8.3–10.4)
CHLORIDE SERPL-SCNC: 103 MMOL/L (ref 98–107)
CO2 SERPL-SCNC: 28 MMOL/L (ref 21–32)
CREAT SERPL-MCNC: 0.67 MG/DL (ref 0.6–1)
ERYTHROCYTE [DISTWIDTH] IN BLOOD BY AUTOMATED COUNT: 12 % (ref 11.9–14.6)
GLOBULIN SER CALC-MCNC: 3.5 G/DL (ref 2.3–3.5)
GLUCOSE SERPL-MCNC: 79 MG/DL (ref 65–100)
HCT VFR BLD AUTO: 42.1 % (ref 35.8–46.3)
HGB BLD-MCNC: 13.7 G/DL (ref 11.7–15.4)
MAGNESIUM SERPL-MCNC: 2.5 MG/DL (ref 1.8–2.4)
MCH RBC QN AUTO: 28.2 PG (ref 26.1–32.9)
MCHC RBC AUTO-ENTMCNC: 32.5 G/DL (ref 31.4–35)
MCV RBC AUTO: 86.8 FL (ref 79.6–97.8)
NRBC # BLD: 0 K/UL (ref 0–0.2)
PLATELET # BLD AUTO: 375 K/UL (ref 150–450)
PMV BLD AUTO: 10.4 FL (ref 9.4–12.3)
POTASSIUM SERPL-SCNC: 4.5 MMOL/L (ref 3.5–5.1)
PROT SERPL-MCNC: 5.8 G/DL (ref 6.3–8.2)
RBC # BLD AUTO: 4.85 M/UL (ref 4.05–5.2)
SODIUM SERPL-SCNC: 137 MMOL/L (ref 136–145)
WBC # BLD AUTO: 13.9 K/UL (ref 4.3–11.1)

## 2021-12-25 PROCEDURE — 36415 COLL VENOUS BLD VENIPUNCTURE: CPT

## 2021-12-25 PROCEDURE — 74011250637 HC RX REV CODE- 250/637: Performed by: STUDENT IN AN ORGANIZED HEALTH CARE EDUCATION/TRAINING PROGRAM

## 2021-12-25 PROCEDURE — 83735 ASSAY OF MAGNESIUM: CPT

## 2021-12-25 PROCEDURE — 80053 COMPREHEN METABOLIC PANEL: CPT

## 2021-12-25 PROCEDURE — 74011250636 HC RX REV CODE- 250/636: Performed by: STUDENT IN AN ORGANIZED HEALTH CARE EDUCATION/TRAINING PROGRAM

## 2021-12-25 PROCEDURE — 65270000029 HC RM PRIVATE

## 2021-12-25 PROCEDURE — 74011250637 HC RX REV CODE- 250/637: Performed by: EMERGENCY MEDICINE

## 2021-12-25 PROCEDURE — 74011250637 HC RX REV CODE- 250/637: Performed by: INTERNAL MEDICINE

## 2021-12-25 PROCEDURE — 85027 COMPLETE CBC AUTOMATED: CPT

## 2021-12-25 PROCEDURE — 94640 AIRWAY INHALATION TREATMENT: CPT

## 2021-12-25 PROCEDURE — 94762 N-INVAS EAR/PLS OXIMTRY CONT: CPT

## 2021-12-25 PROCEDURE — 74011250636 HC RX REV CODE- 250/636: Performed by: INTERNAL MEDICINE

## 2021-12-25 PROCEDURE — 77010033711 HC HIGH FLOW OXYGEN

## 2021-12-25 RX ADMIN — ENOXAPARIN SODIUM 30 MG: 100 INJECTION SUBCUTANEOUS at 08:34

## 2021-12-25 RX ADMIN — ALBUTEROL SULFATE 2 PUFF: 90 AEROSOL, METERED RESPIRATORY (INHALATION) at 14:10

## 2021-12-25 RX ADMIN — BARICITINIB 4 MG: 2 TABLET, FILM COATED ORAL at 08:33

## 2021-12-25 RX ADMIN — FAMOTIDINE 20 MG: 20 TABLET ORAL at 17:01

## 2021-12-25 RX ADMIN — FAMOTIDINE 20 MG: 20 TABLET ORAL at 08:33

## 2021-12-25 RX ADMIN — DULOXETINE HYDROCHLORIDE 30 MG: 30 CAPSULE, DELAYED RELEASE ORAL at 08:33

## 2021-12-25 RX ADMIN — ALBUTEROL SULFATE 2 PUFF: 90 AEROSOL, METERED RESPIRATORY (INHALATION) at 07:25

## 2021-12-25 RX ADMIN — Medication 10 ML: at 06:15

## 2021-12-25 RX ADMIN — ENOXAPARIN SODIUM 30 MG: 100 INJECTION SUBCUTANEOUS at 20:51

## 2021-12-25 RX ADMIN — Medication 10 ML: at 20:51

## 2021-12-25 RX ADMIN — DEXAMETHASONE SODIUM PHOSPHATE 10 MG: 10 INJECTION, SOLUTION INTRAMUSCULAR; INTRAVENOUS at 08:33

## 2021-12-25 RX ADMIN — ALBUTEROL SULFATE 2 PUFF: 90 AEROSOL, METERED RESPIRATORY (INHALATION) at 02:00

## 2021-12-25 RX ADMIN — ALBUTEROL SULFATE 2 PUFF: 90 AEROSOL, METERED RESPIRATORY (INHALATION) at 21:46

## 2021-12-25 RX ADMIN — Medication 10 ML: at 13:53

## 2021-12-25 RX ADMIN — ATENOLOL 50 MG: 50 TABLET ORAL at 08:33

## 2021-12-25 NOTE — PROGRESS NOTES
Pt in bed resting on 6L HF NC, in no apparent distress, call light in reach, SBAR given to Jenny Shaw RN.

## 2021-12-25 NOTE — PROGRESS NOTES
Walk test performed in patients room for 6 min. Pt found resting on 4LNC SpO2 98%. Placed on RA - @ 3 min luiz pt SpO2 87%. Pt stood up, SpO2 dropped to 83% - placed on 2LNC, SpO2 91% -  took pt 40 seconds to compose herself to walk. Walked for 32 seconds and had to rest SpO2 87%. Increased to 3L, patient took the remainder of 6 minutes to recover.   SpO2 resting on 3L after activity 92%

## 2021-12-25 NOTE — PROGRESS NOTES
Hospitalist Progress Note   Admit Date:  2021 10:33 PM   Name:  Elidia Sanchez   Age:  64 y.o. Sex:  female  :  1965   MRN:  [id number]   Room:  [facility]    Presenting Complaint: Positive For Covid-19    Reason(s) for Admission: Pneumonia due to COVID-19 virus [U07.1, J12.82]     Hospital Course & Interval History:   59-year-old female with past medical history of psoriatic arthritis, fibromyalgia, hypertension who was admitted on  with acute hypoxemic respiratory failure secondary to COVID-19. Patient was initially on a few liters of oxygen nasal cannula and then increased to 4 L on . The following day patient was then up to 10 L. CT of the chest negative for pulmonary embolism but did show bilateral infiltrates. Patient currently on 8 L, dexamethasone. Subjective (21):  Patient was seen and examined at the bedside. Patient lying comfortably in bed. Patient states feeling much better today. Walk test today. Patient denied any cardiac chest pain, fever, abdominal pain. Assessment & Plan:   Pneumonia due to COVID-19 virus (2021)  Patient was septic on admission with fever, tachycardia, leukocytosis, infiltrate on chest x-ray  Sepsis now currently resolved  CT chest showed bilateral infiltrates, no pulmonary embolism  Patient continues to desaturate with exertion to low 80s when she gets out of bed  Dexamethasone daily  Baricitinib end of treatment   On 6 L high flow down from 8 L   patient underwent walk test and failed. Patient unable to complete the full 6 min. Patient will require oxygen when going home. We will need to get a repeat walk test at a later date to confirm the amount of oxygen patient will need to go home on.  patient is not at goal. Respiratory status still critical at this time    Fibromyalgia  Continue Cymbalta    Hypertension  Continue atenolol      Dispo/Discharge Planning:    Continue to wean oxygen.  Dispo pending clinical course. PT recommending home health. Will discharge next 24 to 48 h. Diet:  ADULT ORAL NUTRITION SUPPLEMENT Breakfast, Lunch, Dinner; Standard High Calorie/High Protein  ADULT ORAL NUTRITION SUPPLEMENT Breakfast, Lunch, Dinner; Frozen Supplement  ADULT DIET Regular; daily breakfast: toast with jelly and galan; daily lunch/dinner: fruit and sandwich. Chocolate magic cup with all meals. No mixed berry.   DVT PPx: Lovenox  Code status: Full Code    Hospital Problems as of 12/25/2021 Date Reviewed: 12/12/2021          Codes Class Noted - Resolved POA    * (Principal) Pneumonia due to COVID-19 virus ICD-10-CM: U07.1, J12.82  ICD-9-CM: 480.8, 079.89  12/12/2021 - Present Yes        Psoriatic arthritis ([facility] ICD-10-CM: L40.50  ICD-9-CM: 696.0  12/12/2021 - Present Yes        Primary hypertension ICD-10-CM: I10  ICD-9-CM: 401.9  5/24/2013 - Present Yes        Fibromyalgia ICD-10-CM: M79.7  ICD-9-CM: 729.1  5/24/2013 - Present Yes        RESOLVED: Sepsis ([facility] ICD-10-CM: A41.9  ICD-9-CM: 038.9, 995.91  12/15/2021 - 12/15/2021 Yes        RESOLVED: Thrombocytopenia ([facility] ICD-10-CM: D69.6  ICD-9-CM: 287.5  12/15/2021 - 12/15/2021 Unknown        RESOLVED: Diarrhea ICD-10-CM: R19.7  ICD-9-CM: 787.91  12/15/2021 - 12/15/2021 Unknown              Objective:     Patient Vitals for the past 24 hrs:   Temp Pulse Resp BP SpO2   12/25/21 1107 98.4 °F (36.9 °C) 90 20 120/74 92 %   12/25/21 0728 98.7 °F (37.1 °C) 65 20 108/73 95 %   12/25/21 0725     99 %   12/25/21 0355 97.9 °F (36.6 °C) 67 18 110/76 97 %   12/24/21 2210 97.8 °F (36.6 °C) 94 18 103/78 95 %   12/24/21 1907 98 °F (36.7 °C) 92 18 119/75 97 %   12/24/21 1439 98 °F (36.7 °C) 94 20 103/71 97 %   12/24/21 1353     94 %     Oxygen Therapy  O2 Sat (%): 92 % (12/25/21 1107)  Pulse via Oximetry: 73 beats per minute (12/25/21 0725)  O2 Device: Hi flow nasal cannula (12/25/21 0725)  O2 Flow Rate (L/min): 6 l/min (12/25/21 0725)  FIO2 (%): 93 % (12/23/21 1416)    Estimated body mass index is 34.22 kg/m² as calculated from the following:    Height as of this encounter: 5' 6\" (1.676 m). Weight as of this encounter: 96.2 kg (212 lb). Intake/Output Summary (Last 24 hours) at 12/25/2021 1255  Last data filed at 12/25/2021 0924  Gross per 24 hour   Intake 480 ml   Output 650 ml   Net -170 ml         Physical Exam:   Blood pressure 120/74, pulse 90, temperature 98.4 °F (36.9 °C), resp. rate 20, height 5' 6\" (1.676 m), weight 96.2 kg (212 lb), last menstrual period 11/02/2014, SpO2 92 %, not currently breastfeeding. General:    Well nourished. Mild respiratory distress  Head:  Normocephalic, atraumatic  Eyes:  Sclerae appear normal.  Pupils equally round. ENT:  Nares appear normal, no drainage. Moist oral mucosa  Neck:  No restricted ROM. Trachea midline   CV:   RRR. No m/r/g. No jugular venous distension. Lungs:   Coarse breath sounds bilaterally. Respirations even, unlabored  Abdomen: Bowel sounds present. Soft, nontender, nondistended. Extremities: No cyanosis or clubbing. No edema  Skin:     No rashes and normal coloration. Warm and dry. Neuro:  CN II-XII grossly intact. Sensation intact. A&Ox3  Psych:  Normal mood and affect.       I have reviewed ordered lab tests and independently visualized imaging below:    Recent Labs:  Recent Results (from the past 48 hour(s))   CBC W/O DIFF    Collection Time: 12/24/21  7:01 AM   Result Value Ref Range    WBC 12.9 (H) 4.3 - 11.1 K/uL    RBC 4.77 4.05 - 5.2 M/uL    HGB 13.3 11.7 - 15.4 g/dL    HCT 41.1 35.8 - 46.3 %    MCV 86.2 79.6 - 97.8 FL    MCH 27.9 26.1 - 32.9 PG    MCHC 32.4 31.4 - 35.0 g/dL    RDW 12.0 11.9 - 14.6 %    PLATELET 530 (H) 404 - 450 K/uL    MPV 9.9 9.4 - 12.3 FL    ABSOLUTE NRBC 0.00 0.0 - 0.2 K/uL   METABOLIC PANEL, COMPREHENSIVE    Collection Time: 12/24/21  7:01 AM   Result Value Ref Range    Sodium 138 136 - 145 mmol/L    Potassium 4.6 3.5 - 5.1 mmol/L    Chloride 101 98 - 107 mmol/L    CO2 33 (H) 21 - 32 mmol/L    Anion gap 4 (L) 7 - 16 mmol/L    Glucose 80 65 - 100 mg/dL    BUN 21 6 - 23 MG/DL    Creatinine 0.71 0.6 - 1.0 MG/DL    GFR est AA >60 >60 ml/min/1.73m2    GFR est non-AA >60 >60 ml/min/1.73m2    Calcium 8.9 8.3 - 10.4 MG/DL    Bilirubin, total 0.4 0.2 - 1.1 MG/DL    ALT (SGPT) 43 12 - 65 U/L    AST (SGOT) 17 15 - 37 U/L    Alk. phosphatase 52 50 - 136 U/L    Protein, total 5.6 (L) 6.3 - 8.2 g/dL    Albumin 2.2 (L) 3.5 - 5.0 g/dL    Globulin 3.4 2.3 - 3.5 g/dL    A-G Ratio 0.6 (L) 1.2 - 3.5     MAGNESIUM    Collection Time: 12/24/21  7:01 AM   Result Value Ref Range    Magnesium 2.5 (H) 1.8 - 2.4 mg/dL   CBC W/O DIFF    Collection Time: 12/25/21  7:53 AM   Result Value Ref Range    WBC 13.9 (H) 4.3 - 11.1 K/uL    RBC 4.85 4.05 - 5.2 M/uL    HGB 13.7 11.7 - 15.4 g/dL    HCT 42.1 35.8 - 46.3 %    MCV 86.8 79.6 - 97.8 FL    MCH 28.2 26.1 - 32.9 PG    MCHC 32.5 31.4 - 35.0 g/dL    RDW 12.0 11.9 - 14.6 %    PLATELET 326 062 - 074 K/uL    MPV 10.4 9.4 - 12.3 FL    ABSOLUTE NRBC 0.00 0.0 - 0.2 K/uL   METABOLIC PANEL, COMPREHENSIVE    Collection Time: 12/25/21  7:53 AM   Result Value Ref Range    Sodium 137 136 - 145 mmol/L    Potassium 4.5 3.5 - 5.1 mmol/L    Chloride 103 98 - 107 mmol/L    CO2 28 21 - 32 mmol/L    Anion gap 6 (L) 7 - 16 mmol/L    Glucose 79 65 - 100 mg/dL    BUN 18 6 - 23 MG/DL    Creatinine 0.67 0.6 - 1.0 MG/DL    GFR est AA >60 >60 ml/min/1.73m2    GFR est non-AA >60 >60 ml/min/1.73m2    Calcium 9.1 8.3 - 10.4 MG/DL    Bilirubin, total 0.4 0.2 - 1.1 MG/DL    ALT (SGPT) 52 12 - 65 U/L    AST (SGOT) 21 15 - 37 U/L    Alk.  phosphatase 55 50 - 136 U/L    Protein, total 5.8 (L) 6.3 - 8.2 g/dL    Albumin 2.3 (L) 3.5 - 5.0 g/dL    Globulin 3.5 2.3 - 3.5 g/dL    A-G Ratio 0.7 (L) 1.2 - 3.5     MAGNESIUM    Collection Time: 12/25/21  7:53 AM   Result Value Ref Range    Magnesium 2.5 (H) 1.8 - 2.4 mg/dL       All Micro Results     Procedure Component Value Units Date/Time BLOOD CULTURE [[id number]] Collected: 12/11/21 2256    Order Status: Completed Specimen: Blood Updated: 12/17/21 0731     Special Requests: NO SPECIAL REQUESTS        Culture result: NO GROWTH 5 DAYS       CULTURE, STOOL [[id number]] Collected: 12/12/21 1640    Order Status: Completed Specimen: Stool Updated: 12/15/21 0653     Special Requests: NO SPECIAL REQUESTS        Culture result:       No Salmonella, Shigella, or Ecoli 0157 isolated. OVA & PARASITES, STOOL [[id number]]     Order Status: Canceled Specimen: Feces from Stool     COVID-19 RAPID TEST [[id number]]  (Abnormal) Collected: 12/12/21 1346    Order Status: Completed Specimen: Nasopharyngeal Updated: 12/12/21 1411     Specimen source NASAL        Comment: RAPID ONLY        COVID-19 rapid test Detected        Comment: RESULTS VERIFIED, PHONED TO AND READ BACK BY  Low Patel RN ON 12/12/21 @1410, TA       The specimen is POSITIVE for SARS-CoV-2, the novel coronavirus associated with COVID-19. This test has been authorized by the FDA under an Emergency Use Authorization (EUA) for use by authorized laboratories. Fact sheet for Healthcare Providers: [employer]co[employer]nz  Fact sheet for Patients: [employer]       Methodology: Isothermal Nucleic Acid Amplification         BLOOD CULTURE [[id number]]     Order Status: Canceled Specimen: Blood           Other Studies:  No results found.     Current Meds:  Current Facility-Administered Medications   Medication Dose Route Frequency    dexamethasone (DECADRON) 10 mg/mL injection 10 mg  10 mg IntraVENous Q24H    albuterol (PROVENTIL HFA, VENTOLIN HFA, PROAIR HFA) inhaler 2 Puff  2 Puff Inhalation Q6H RT    famotidine (PEPCID) tablet 20 mg  20 mg Oral BID    DULoxetine (CYMBALTA) capsule 30 mg  30 mg Oral DAILY    atenoloL (TENORMIN) tablet 50 mg  50 mg Oral DAILY    traMADoL (ULTRAM) tablet 50 mg  50 mg Oral Q6H PRN    sodium chloride (NS) flush 5-40 mL  5-40 mL IntraVENous PRN    acetaminophen (TYLENOL) tablet 650 mg  650 mg Oral Q6H PRN    Or    acetaminophen (TYLENOL) suppository 650 mg  650 mg Rectal Q6H PRN    polyethylene glycol (MIRALAX) packet 17 g  17 g Oral DAILY PRN    ondansetron (ZOFRAN ODT) tablet 4 mg  4 mg Oral Q8H PRN    Or    ondansetron (ZOFRAN) injection 4 mg  4 mg IntraVENous Q6H PRN    guaiFENesin-dextromethorphan (ROBITUSSIN DM) 100-10 mg/5 mL syrup 5 mL  5 mL Oral Q4H PRN    benzonatate (TESSALON) capsule 100 mg  100 mg Oral TID PRN    baricitinib (OLUMIANT) tablet 4 mg  4 mg Oral DAILY    enoxaparin (LOVENOX) injection 30 mg  30 mg SubCUTAneous Q12H    diclofenac (VOLTAREN) 1 % topical gel 1-2 g  1-2 g Topical TID PRN    sodium chloride (NS) flush 5-10 mL  5-10 mL IntraVENous Q8H    sodium chloride (NS) flush 5-10 mL  5-10 mL IntraVENous PRN       Signed:  Vinnie Lew MD    Part of this note may have been written by using a voice dictation software. The note has been proof read but may still contain some grammatical/other typographical errors.

## 2021-12-26 LAB
ALBUMIN SERPL-MCNC: 2.6 G/DL (ref 3.5–5)
ALBUMIN/GLOB SERPL: 0.8 {RATIO} (ref 1.2–3.5)
ALP SERPL-CCNC: 54 U/L (ref 50–136)
ALT SERPL-CCNC: 52 U/L (ref 12–65)
ANION GAP SERPL CALC-SCNC: 3 MMOL/L (ref 7–16)
AST SERPL-CCNC: 16 U/L (ref 15–37)
BILIRUB SERPL-MCNC: 0.4 MG/DL (ref 0.2–1.1)
BUN SERPL-MCNC: 19 MG/DL (ref 6–23)
CALCIUM SERPL-MCNC: 9.1 MG/DL (ref 8.3–10.4)
CHLORIDE SERPL-SCNC: 99 MMOL/L (ref 98–107)
CO2 SERPL-SCNC: 34 MMOL/L (ref 21–32)
CREAT SERPL-MCNC: 0.68 MG/DL (ref 0.6–1)
ERYTHROCYTE [DISTWIDTH] IN BLOOD BY AUTOMATED COUNT: 12.2 % (ref 11.9–14.6)
GLOBULIN SER CALC-MCNC: 3.2 G/DL (ref 2.3–3.5)
GLUCOSE SERPL-MCNC: 79 MG/DL (ref 65–100)
HCT VFR BLD AUTO: 40.2 % (ref 35.8–46.3)
HGB BLD-MCNC: 13.3 G/DL (ref 11.7–15.4)
MAGNESIUM SERPL-MCNC: 2.5 MG/DL (ref 1.8–2.4)
MCH RBC QN AUTO: 28.6 PG (ref 26.1–32.9)
MCHC RBC AUTO-ENTMCNC: 33.1 G/DL (ref 31.4–35)
MCV RBC AUTO: 86.5 FL (ref 79.6–97.8)
NRBC # BLD: 0 K/UL (ref 0–0.2)
PLATELET # BLD AUTO: 442 K/UL (ref 150–450)
PMV BLD AUTO: 9.7 FL (ref 9.4–12.3)
POTASSIUM SERPL-SCNC: 4.4 MMOL/L (ref 3.5–5.1)
PROT SERPL-MCNC: 5.8 G/DL (ref 6.3–8.2)
RBC # BLD AUTO: 4.65 M/UL (ref 4.05–5.2)
SODIUM SERPL-SCNC: 136 MMOL/L (ref 136–145)
WBC # BLD AUTO: 11.9 K/UL (ref 4.3–11.1)

## 2021-12-26 PROCEDURE — 74011250637 HC RX REV CODE- 250/637: Performed by: STUDENT IN AN ORGANIZED HEALTH CARE EDUCATION/TRAINING PROGRAM

## 2021-12-26 PROCEDURE — 94640 AIRWAY INHALATION TREATMENT: CPT

## 2021-12-26 PROCEDURE — 36415 COLL VENOUS BLD VENIPUNCTURE: CPT

## 2021-12-26 PROCEDURE — 83735 ASSAY OF MAGNESIUM: CPT

## 2021-12-26 PROCEDURE — 74011250636 HC RX REV CODE- 250/636: Performed by: INTERNAL MEDICINE

## 2021-12-26 PROCEDURE — 85027 COMPLETE CBC AUTOMATED: CPT

## 2021-12-26 PROCEDURE — 74011250637 HC RX REV CODE- 250/637: Performed by: EMERGENCY MEDICINE

## 2021-12-26 PROCEDURE — 65270000029 HC RM PRIVATE

## 2021-12-26 PROCEDURE — 74011250636 HC RX REV CODE- 250/636: Performed by: STUDENT IN AN ORGANIZED HEALTH CARE EDUCATION/TRAINING PROGRAM

## 2021-12-26 PROCEDURE — 80053 COMPREHEN METABOLIC PANEL: CPT

## 2021-12-26 PROCEDURE — 77010033711 HC HIGH FLOW OXYGEN

## 2021-12-26 PROCEDURE — 74011250637 HC RX REV CODE- 250/637: Performed by: INTERNAL MEDICINE

## 2021-12-26 PROCEDURE — 94762 N-INVAS EAR/PLS OXIMTRY CONT: CPT

## 2021-12-26 RX ADMIN — Medication 10 ML: at 20:53

## 2021-12-26 RX ADMIN — ALBUTEROL SULFATE 2 PUFF: 90 AEROSOL, METERED RESPIRATORY (INHALATION) at 07:13

## 2021-12-26 RX ADMIN — ATENOLOL 50 MG: 50 TABLET ORAL at 09:17

## 2021-12-26 RX ADMIN — DULOXETINE HYDROCHLORIDE 30 MG: 30 CAPSULE, DELAYED RELEASE ORAL at 09:17

## 2021-12-26 RX ADMIN — ALBUTEROL SULFATE 2 PUFF: 90 AEROSOL, METERED RESPIRATORY (INHALATION) at 21:21

## 2021-12-26 RX ADMIN — ALBUTEROL SULFATE 2 PUFF: 90 AEROSOL, METERED RESPIRATORY (INHALATION) at 14:12

## 2021-12-26 RX ADMIN — DEXAMETHASONE SODIUM PHOSPHATE 10 MG: 10 INJECTION, SOLUTION INTRAMUSCULAR; INTRAVENOUS at 09:17

## 2021-12-26 RX ADMIN — FAMOTIDINE 20 MG: 20 TABLET ORAL at 09:17

## 2021-12-26 RX ADMIN — ENOXAPARIN SODIUM 30 MG: 100 INJECTION SUBCUTANEOUS at 09:17

## 2021-12-26 RX ADMIN — FAMOTIDINE 20 MG: 20 TABLET ORAL at 17:19

## 2021-12-26 RX ADMIN — ENOXAPARIN SODIUM 30 MG: 100 INJECTION SUBCUTANEOUS at 20:53

## 2021-12-26 RX ADMIN — ALBUTEROL SULFATE 2 PUFF: 90 AEROSOL, METERED RESPIRATORY (INHALATION) at 02:01

## 2021-12-26 NOTE — PROGRESS NOTES
Remains up in recliner  Hopes to go home soon  Still requiring increased oxygen with activity  3l at rest

## 2021-12-26 NOTE — PROGRESS NOTES
Hospitalist Progress Note   Admit Date:  2021 10:33 PM   Name:  Ирина Jorge   Age:  64 y.o. Sex:  female  :  1965   MRN:  [id number]   Room:  [facility]/    Presenting Complaint: Positive For Covid-19    Reason(s) for Admission: Pneumonia due to COVID-19 virus [U07.1, J12.82]     Hospital Course & Interval History:   59-year-old female with past medical history of psoriatic arthritis, fibromyalgia, hypertension who was admitted on  with acute hypoxemic respiratory failure secondary to COVID-19. Patient was initially on a few liters of oxygen nasal cannula and then increased to 4 L on . The following day patient was then up to 10 L. CT of the chest negative for pulmonary embolism but did show bilateral infiltrates. Patient currently on 8 L, dexamethasone. Subjective (21): Patient was seen and examined at the bedside. Patient sitting in chair comfortably on 3 L nasal cannula patient continues to desat. In the 80s with minimal exertion. Patient without any major complaints. Patient denied any cardiac chest pain, fever, abdominal pain. Assessment & Plan:   Pneumonia due to COVID-19 virus (2021)  Patient was septic on admission with fever, tachycardia, leukocytosis, infiltrate on chest x-ray  Sepsis now currently resolved  CT chest showed bilateral infiltrates, no pulmonary embolism  Patient continues to desaturate with exertion to low 80s when she gets out of bed  Dexamethasone daily  Baricitinib end of treatment  patient underwent walk test and failed. Patient unable to complete the full 6 min. Patient will require oxygen when going home.  patient is not at goal. Respiratory status still critical at this time  Patient continues to desat to the 80s with minimal exertion  Get repeat walk test     Fibromyalgia  Continue Cymbalta    Hypertension  Continue atenolol      Dispo/Discharge Planning:    Continue to wean oxygen.  Dispo pending clinical course. PT recommending home health. Diet:  ADULT ORAL NUTRITION SUPPLEMENT Breakfast, Lunch, Dinner; Standard High Calorie/High Protein  ADULT ORAL NUTRITION SUPPLEMENT Breakfast, Lunch, Dinner; Frozen Supplement  ADULT DIET Regular; daily breakfast: toast with jelly and galna; daily lunch/dinner: fruit and sandwich. Chocolate magic cup with all meals. No mixed berry.   DVT PPx: Lovenox  Code status: Full Code    Hospital Problems as of 12/26/2021 Date Reviewed: 12/12/2021          Codes Class Noted - Resolved POA    * (Principal) Pneumonia due to COVID-19 virus ICD-10-CM: U07.1, J12.82  ICD-9-CM: 480.8, 079.89  12/12/2021 - Present Yes        Psoriatic arthritis ([facility] ICD-10-CM: L40.50  ICD-9-CM: 696.0  12/12/2021 - Present Yes        Primary hypertension ICD-10-CM: I10  ICD-9-CM: 401.9  5/24/2013 - Present Yes        Fibromyalgia ICD-10-CM: M79.7  ICD-9-CM: 729.1  5/24/2013 - Present Yes        RESOLVED: Sepsis ([facility] ICD-10-CM: A41.9  ICD-9-CM: 038.9, 995.91  12/15/2021 - 12/15/2021 Yes        RESOLVED: Thrombocytopenia ([facility] ICD-10-CM: D69.6  ICD-9-CM: 287.5  12/15/2021 - 12/15/2021 Unknown        RESOLVED: Diarrhea ICD-10-CM: R19.7  ICD-9-CM: 787.91  12/15/2021 - 12/15/2021 Unknown              Objective:     Patient Vitals for the past 24 hrs:   Temp Pulse Resp BP SpO2   12/26/21 1108 98.5 °F (36.9 °C) 88 20 112/75 99 %   12/26/21 0752 98.4 °F (36.9 °C) 85 20 125/79 90 %   12/26/21 0713     96 %   12/26/21 0351 98.5 °F (36.9 °C) 61 20 124/81 97 %   12/26/21 0105     98 %   12/26/21 0006 98 °F (36.7 °C) 72 18 (!) 140/75 99 %   12/25/21 2303  66 18  97 %   12/25/21 1925     95 %   12/25/21 1922 98.3 °F (36.8 °C) 77 20 120/72 96 %   12/25/21 1510 97.6 °F (36.4 °C) (!) 101 20 114/69 100 %   12/25/21 1419     93 %     Oxygen Therapy  O2 Sat (%): 99 % (12/26/21 1108)  Pulse via Oximetry: 79 beats per minute (12/26/21 0713)  O2 Device: Hi flow nasal cannula (12/26/21 0713)  O2 Flow Rate (L/min): 3 l/min (12/26/21 0713)  FIO2 (%): 93 % (12/23/21 1416)    Estimated body mass index is 34.49 kg/m² as calculated from the following:    Height as of this encounter: 5' 6\" (1.676 m). Weight as of this encounter: 96.9 kg (213 lb 11.2 oz). Intake/Output Summary (Last 24 hours) at 12/26/2021 1332  Last data filed at 12/26/2021 0830  Gross per 24 hour   Intake 720 ml   Output 900 ml   Net -180 ml         Physical Exam:   Blood pressure 112/75, pulse 88, temperature 98.5 °F (36.9 °C), resp. rate 20, height 5' 6\" (1.676 m), weight 96.9 kg (213 lb 11.2 oz), last menstrual period 11/02/2014, SpO2 99 %, not currently breastfeeding. General:    Well nourished. Mild respiratory distress  Head:  Normocephalic, atraumatic  Eyes:  Sclerae appear normal.  Pupils equally round. ENT:  Nares appear normal, no drainage. Moist oral mucosa  Neck:  No restricted ROM. Trachea midline   CV:   RRR. No m/r/g. No jugular venous distension. Lungs:   Coarse breath sounds bilaterally. Respirations even, unlabored  Abdomen: Bowel sounds present. Soft, nontender, nondistended. Extremities: No cyanosis or clubbing. No edema  Skin:     No rashes and normal coloration. Warm and dry. Neuro:  CN II-XII grossly intact. Sensation intact. A&Ox3  Psych:  Normal mood and affect.       I have reviewed ordered lab tests and independently visualized imaging below:    Recent Labs:  Recent Results (from the past 48 hour(s))   CBC W/O DIFF    Collection Time: 12/25/21  7:53 AM   Result Value Ref Range    WBC 13.9 (H) 4.3 - 11.1 K/uL    RBC 4.85 4.05 - 5.2 M/uL    HGB 13.7 11.7 - 15.4 g/dL    HCT 42.1 35.8 - 46.3 %    MCV 86.8 79.6 - 97.8 FL    MCH 28.2 26.1 - 32.9 PG    MCHC 32.5 31.4 - 35.0 g/dL    RDW 12.0 11.9 - 14.6 %    PLATELET 662 974 - 469 K/uL    MPV 10.4 9.4 - 12.3 FL    ABSOLUTE NRBC 0.00 0.0 - 0.2 K/uL   METABOLIC PANEL, COMPREHENSIVE    Collection Time: 12/25/21  7:53 AM   Result Value Ref Range    Sodium 137 136 - 145 mmol/L    Potassium 4.5 3.5 - 5.1 mmol/L    Chloride 103 98 - 107 mmol/L    CO2 28 21 - 32 mmol/L    Anion gap 6 (L) 7 - 16 mmol/L    Glucose 79 65 - 100 mg/dL    BUN 18 6 - 23 MG/DL    Creatinine 0.67 0.6 - 1.0 MG/DL    GFR est AA >60 >60 ml/min/1.73m2    GFR est non-AA >60 >60 ml/min/1.73m2    Calcium 9.1 8.3 - 10.4 MG/DL    Bilirubin, total 0.4 0.2 - 1.1 MG/DL    ALT (SGPT) 52 12 - 65 U/L    AST (SGOT) 21 15 - 37 U/L    Alk. phosphatase 55 50 - 136 U/L    Protein, total 5.8 (L) 6.3 - 8.2 g/dL    Albumin 2.3 (L) 3.5 - 5.0 g/dL    Globulin 3.5 2.3 - 3.5 g/dL    A-G Ratio 0.7 (L) 1.2 - 3.5     MAGNESIUM    Collection Time: 12/25/21  7:53 AM   Result Value Ref Range    Magnesium 2.5 (H) 1.8 - 2.4 mg/dL   CBC W/O DIFF    Collection Time: 12/26/21  7:37 AM   Result Value Ref Range    WBC 11.9 (H) 4.3 - 11.1 K/uL    RBC 4.65 4.05 - 5.2 M/uL    HGB 13.3 11.7 - 15.4 g/dL    HCT 40.2 35.8 - 46.3 %    MCV 86.5 79.6 - 97.8 FL    MCH 28.6 26.1 - 32.9 PG    MCHC 33.1 31.4 - 35.0 g/dL    RDW 12.2 11.9 - 14.6 %    PLATELET 617 107 - 062 K/uL    MPV 9.7 9.4 - 12.3 FL    ABSOLUTE NRBC 0.00 0.0 - 0.2 K/uL   METABOLIC PANEL, COMPREHENSIVE    Collection Time: 12/26/21  7:37 AM   Result Value Ref Range    Sodium 136 136 - 145 mmol/L    Potassium 4.4 3.5 - 5.1 mmol/L    Chloride 99 98 - 107 mmol/L    CO2 34 (H) 21 - 32 mmol/L    Anion gap 3 (L) 7 - 16 mmol/L    Glucose 79 65 - 100 mg/dL    BUN 19 6 - 23 MG/DL    Creatinine 0.68 0.6 - 1.0 MG/DL    GFR est AA >60 >60 ml/min/1.73m2    GFR est non-AA >60 >60 ml/min/1.73m2    Calcium 9.1 8.3 - 10.4 MG/DL    Bilirubin, total 0.4 0.2 - 1.1 MG/DL    ALT (SGPT) 52 12 - 65 U/L    AST (SGOT) 16 15 - 37 U/L    Alk.  phosphatase 54 50 - 136 U/L    Protein, total 5.8 (L) 6.3 - 8.2 g/dL    Albumin 2.6 (L) 3.5 - 5.0 g/dL    Globulin 3.2 2.3 - 3.5 g/dL    A-G Ratio 0.8 (L) 1.2 - 3.5     MAGNESIUM    Collection Time: 12/26/21  7:37 AM   Result Value Ref Range    Magnesium 2.5 (H) 1.8 - 2.4 mg/dL       All Micro Results     Procedure Component Value Units Date/Time    BLOOD CULTURE [[id number]] Collected: 12/11/21 2256    Order Status: Completed Specimen: Blood Updated: 12/17/21 0731     Special Requests: NO SPECIAL REQUESTS        Culture result: NO GROWTH 5 DAYS       CULTURE, STOOL [[id number]] Collected: 12/12/21 1640    Order Status: Completed Specimen: Stool Updated: 12/15/21 9616     Special Requests: NO SPECIAL REQUESTS        Culture result:       No Salmonella, Shigella, or Ecoli 0157 isolated. OVA & PARASITES, STOOL [[id number]]     Order Status: Canceled Specimen: Feces from Stool     COVID-19 RAPID TEST [[id number]]  (Abnormal) Collected: 12/12/21 1346    Order Status: Completed Specimen: Nasopharyngeal Updated: 12/12/21 1411     Specimen source NASAL        Comment: RAPID ONLY        COVID-19 rapid test Detected        Comment: RESULTS VERIFIED, PHONED TO AND READ BACK BY  William Haile RN ON 12/12/21 @1410, TA       The specimen is POSITIVE for SARS-CoV-2, the novel coronavirus associated with COVID-19. This test has been authorized by the FDA under an Emergency Use Authorization (EUA) for use by authorized laboratories. Fact sheet for Healthcare Providers: ConventionUpdate.co.nz  Fact sheet for Patients: ConventionUpdate.co.nz       Methodology: Isothermal Nucleic Acid Amplification         BLOOD CULTURE [[id number]]     Order Status: Canceled Specimen: Blood           Other Studies:  No results found.     Current Meds:  Current Facility-Administered Medications   Medication Dose Route Frequency    dexamethasone (DECADRON) 10 mg/mL injection 10 mg  10 mg IntraVENous Q24H    albuterol (PROVENTIL HFA, VENTOLIN HFA, PROAIR HFA) inhaler 2 Puff  2 Puff Inhalation Q6H RT    famotidine (PEPCID) tablet 20 mg  20 mg Oral BID    DULoxetine (CYMBALTA) capsule 30 mg  30 mg Oral DAILY    atenoloL (TENORMIN) tablet 50 mg  50 mg Oral DAILY    traMADoL (ULTRAM) tablet 50 mg  50 mg Oral Q6H PRN    sodium chloride (NS) flush 5-40 mL  5-40 mL IntraVENous PRN    acetaminophen (TYLENOL) tablet 650 mg  650 mg Oral Q6H PRN    Or    acetaminophen (TYLENOL) suppository 650 mg  650 mg Rectal Q6H PRN    polyethylene glycol (MIRALAX) packet 17 g  17 g Oral DAILY PRN    ondansetron (ZOFRAN ODT) tablet 4 mg  4 mg Oral Q8H PRN    Or    ondansetron (ZOFRAN) injection 4 mg  4 mg IntraVENous Q6H PRN    guaiFENesin-dextromethorphan (ROBITUSSIN DM) 100-10 mg/5 mL syrup 5 mL  5 mL Oral Q4H PRN    benzonatate (TESSALON) capsule 100 mg  100 mg Oral TID PRN    enoxaparin (LOVENOX) injection 30 mg  30 mg SubCUTAneous Q12H    diclofenac (VOLTAREN) 1 % topical gel 1-2 g  1-2 g Topical TID PRN    sodium chloride (NS) flush 5-10 mL  5-10 mL IntraVENous Q8H    sodium chloride (NS) flush 5-10 mL  5-10 mL IntraVENous PRN       Signed:  Oscar Crawford MD    Part of this note may have been written by using a voice dictation software. The note has been proof read but may still contain some grammatical/other typographical errors.

## 2021-12-26 NOTE — ROUTINE PROCESS
Bedside report received from Rhonda Jarvis RN. Pt sitting up in bed watching television. No distress on 3L via NC. Call light within reach. Instructed pt to call should needs arise. Pt voiced understanding.

## 2021-12-27 VITALS
SYSTOLIC BLOOD PRESSURE: 94 MMHG | TEMPERATURE: 97.6 F | BODY MASS INDEX: 34.34 KG/M2 | HEART RATE: 88 BPM | OXYGEN SATURATION: 93 % | DIASTOLIC BLOOD PRESSURE: 69 MMHG | RESPIRATION RATE: 20 BRPM | HEIGHT: 66 IN | WEIGHT: 213.7 LBS

## 2021-12-27 LAB
ALBUMIN SERPL-MCNC: 2.5 G/DL (ref 3.5–5)
ALBUMIN/GLOB SERPL: 0.7 {RATIO} (ref 1.2–3.5)
ALP SERPL-CCNC: 59 U/L (ref 50–136)
ALT SERPL-CCNC: 53 U/L (ref 12–65)
ANION GAP SERPL CALC-SCNC: 5 MMOL/L (ref 7–16)
AST SERPL-CCNC: 12 U/L (ref 15–37)
BILIRUB SERPL-MCNC: 0.3 MG/DL (ref 0.2–1.1)
BUN SERPL-MCNC: 21 MG/DL (ref 6–23)
CALCIUM SERPL-MCNC: 9.1 MG/DL (ref 8.3–10.4)
CHLORIDE SERPL-SCNC: 99 MMOL/L (ref 98–107)
CO2 SERPL-SCNC: 33 MMOL/L (ref 21–32)
CREAT SERPL-MCNC: 0.74 MG/DL (ref 0.6–1)
ERYTHROCYTE [DISTWIDTH] IN BLOOD BY AUTOMATED COUNT: 12.1 % (ref 11.9–14.6)
GLOBULIN SER CALC-MCNC: 3.4 G/DL (ref 2.3–3.5)
GLUCOSE SERPL-MCNC: 93 MG/DL (ref 65–100)
HCT VFR BLD AUTO: 41 % (ref 35.8–46.3)
HGB BLD-MCNC: 13.4 G/DL (ref 11.7–15.4)
MAGNESIUM SERPL-MCNC: 2.5 MG/DL (ref 1.8–2.4)
MCH RBC QN AUTO: 28.8 PG (ref 26.1–32.9)
MCHC RBC AUTO-ENTMCNC: 32.7 G/DL (ref 31.4–35)
MCV RBC AUTO: 88 FL (ref 79.6–97.8)
NRBC # BLD: 0 K/UL (ref 0–0.2)
PLATELET # BLD AUTO: 420 K/UL (ref 150–450)
PMV BLD AUTO: 9.7 FL (ref 9.4–12.3)
POTASSIUM SERPL-SCNC: 4.4 MMOL/L (ref 3.5–5.1)
PROT SERPL-MCNC: 5.9 G/DL (ref 6.3–8.2)
RBC # BLD AUTO: 4.66 M/UL (ref 4.05–5.2)
SODIUM SERPL-SCNC: 137 MMOL/L (ref 136–145)
WBC # BLD AUTO: 13.4 K/UL (ref 4.3–11.1)

## 2021-12-27 PROCEDURE — 74011250637 HC RX REV CODE- 250/637: Performed by: INTERNAL MEDICINE

## 2021-12-27 PROCEDURE — 83735 ASSAY OF MAGNESIUM: CPT

## 2021-12-27 PROCEDURE — 74011250637 HC RX REV CODE- 250/637: Performed by: STUDENT IN AN ORGANIZED HEALTH CARE EDUCATION/TRAINING PROGRAM

## 2021-12-27 PROCEDURE — 74011250636 HC RX REV CODE- 250/636: Performed by: INTERNAL MEDICINE

## 2021-12-27 PROCEDURE — 94762 N-INVAS EAR/PLS OXIMTRY CONT: CPT

## 2021-12-27 PROCEDURE — 80053 COMPREHEN METABOLIC PANEL: CPT

## 2021-12-27 PROCEDURE — 85027 COMPLETE CBC AUTOMATED: CPT

## 2021-12-27 PROCEDURE — 74011250636 HC RX REV CODE- 250/636: Performed by: STUDENT IN AN ORGANIZED HEALTH CARE EDUCATION/TRAINING PROGRAM

## 2021-12-27 PROCEDURE — 36415 COLL VENOUS BLD VENIPUNCTURE: CPT

## 2021-12-27 PROCEDURE — 77010033711 HC HIGH FLOW OXYGEN

## 2021-12-27 PROCEDURE — 74011250637 HC RX REV CODE- 250/637: Performed by: EMERGENCY MEDICINE

## 2021-12-27 PROCEDURE — 94640 AIRWAY INHALATION TREATMENT: CPT

## 2021-12-27 RX ORDER — BENZONATATE 100 MG/1
100 CAPSULE ORAL
Qty: 21 CAPSULE | Refills: 0 | Status: SHIPPED | OUTPATIENT
Start: 2021-12-27 | End: 2022-01-03

## 2021-12-27 RX ORDER — DULOXETIN HYDROCHLORIDE 30 MG/1
30 CAPSULE, DELAYED RELEASE ORAL DAILY
Qty: 7 CAPSULE | Refills: 0 | Status: SHIPPED | OUTPATIENT
Start: 2021-12-28 | End: 2022-01-04

## 2021-12-27 RX ORDER — DEXAMETHASONE 6 MG/1
6 TABLET ORAL
Qty: 6 TABLET | Refills: 0 | Status: SHIPPED | OUTPATIENT
Start: 2021-12-27 | End: 2022-01-02

## 2021-12-27 RX ORDER — FAMOTIDINE 20 MG/1
20 TABLET, FILM COATED ORAL 2 TIMES DAILY
Qty: 28 TABLET | Refills: 0 | Status: SHIPPED | OUTPATIENT
Start: 2021-12-27 | End: 2022-01-10

## 2021-12-27 RX ADMIN — DEXAMETHASONE SODIUM PHOSPHATE 10 MG: 10 INJECTION, SOLUTION INTRAMUSCULAR; INTRAVENOUS at 09:00

## 2021-12-27 RX ADMIN — DULOXETINE HYDROCHLORIDE 30 MG: 30 CAPSULE, DELAYED RELEASE ORAL at 09:00

## 2021-12-27 RX ADMIN — ALBUTEROL SULFATE 2 PUFF: 90 AEROSOL, METERED RESPIRATORY (INHALATION) at 03:15

## 2021-12-27 RX ADMIN — ENOXAPARIN SODIUM 30 MG: 100 INJECTION SUBCUTANEOUS at 09:00

## 2021-12-27 RX ADMIN — FAMOTIDINE 20 MG: 20 TABLET ORAL at 09:00

## 2021-12-27 RX ADMIN — ALBUTEROL SULFATE 2 PUFF: 90 AEROSOL, METERED RESPIRATORY (INHALATION) at 08:01

## 2021-12-27 RX ADMIN — ATENOLOL 50 MG: 50 TABLET ORAL at 09:00

## 2021-12-27 NOTE — PROGRESS NOTES
Bedside report received from L-3 Juan Daniel, RN. No distress on 3L via NC. Instructed pt to call should needs arise. Pt voiced understanding. Call light within reach.

## 2021-12-27 NOTE — DISCHARGE SUMMARY
Hospitalist Discharge Summary   Admit Date:  2021 10:33 PM   DC Note date: 2021  Name:  Ирина Jorge   Age:  64 y.o. Sex:  female  :  1965   MRN:  [id number]   Room:  [facility]  PCP:  Park West MD    Presenting Complaint: Positive For Covid-19    Initial Admission Diagnosis: Pneumonia due to COVID-19 virus [U07.1, J12.82]     Problem List for this Hospitalization:  Hospital Problems as of 2021 Date Reviewed: 2021          Codes Class Noted - Resolved POA    * (Principal) Pneumonia due to COVID-19 virus ICD-10-CM: U07.1, J12.82  ICD-9-CM: 480.8, 079.89  2021 - Present Yes        Psoriatic arthritis ([facility] ICD-10-CM: L40.50  ICD-9-CM: 696.0  2021 - Present Yes        Primary hypertension ICD-10-CM: I10  ICD-9-CM: 401.9  2013 - Present Yes        Fibromyalgia ICD-10-CM: M79.7  ICD-9-CM: 729.1  2013 - Present Yes        RESOLVED: Sepsis ([facility] ICD-10-CM: A41.9  ICD-9-CM: 038.9, 995.91  12/15/2021 - 12/15/2021 Yes        RESOLVED: Thrombocytopenia ([facility] ICD-10-CM: D69.6  ICD-9-CM: 287.5  12/15/2021 - 12/15/2021 Unknown        RESOLVED: Diarrhea ICD-10-CM: R19.7  ICD-9-CM: 787.91  12/15/2021 - 12/15/2021 Unknown            Did Patient have Sepsis (YES OR NO): Yes    Hospital Course:  63-year-old female with past medical history of psoriatic arthritis, fibromyalgia, hypertension who was admitted on  with acute hypoxemic respiratory failure secondary to COVID-19. Patient was initially on a few liters of oxygen nasal cannula and then increased to 4 L on . The following day patient was then up to 10 L. CT of the chest negative for pulmonary embolism but did show bilateral infiltrates. Patient was septic on admission with fever, tachycardia, leukocytosis and infiltrate on chest x-ray. Sepsis letter resolved during hospitalization. Patient was continued on dexamethasone daily and was on baricitinib with end of treatment on .   On  patient underwent walk test and failed was unable to complete the full 6 minutes. Patient underwent walk test on day of discharge 12/27 and passed requiring only 4 L of oxygen to maintain oxygen saturation greater than 90%. For patient's other comorbid conditions she was restarted back on her home medications. Patient was advised that she needs to follow-up with her primary care provider within the next 3 to 5 days to discuss recent hospitalization any changes made to her medication. Patient will be going home with home oxygen and she will need to discuss this with her PCP on when to come off or when it was safe to come off the oxygen based on oxygen saturation. Patient was advised that if she begins to feel more short of breath even with oxygenation, develops fever/chills to come back to the emergency room to be evaluated. On day of discharge patient was feeling much better and had no complaint of cardiac chest pain, abdominal pain, fever/chills. Therapy evaluated patient and recommended home health. Disposition: Home Health Care [facility] [place], [place]  Diet: ADULT ORAL NUTRITION SUPPLEMENT Breakfast, Lunch, Dinner; Standard High Calorie/High Protein  ADULT ORAL NUTRITION SUPPLEMENT Breakfast, Lunch, Dinner; Frozen Supplement  ADULT DIET Regular; daily breakfast: toast with jelly and galan; daily lunch/dinner: fruit and sandwich. Chocolate magic cup with all meals. No mixed berry. Code Status: Full Code    Follow Up Orders: Follow-up Appointments   Procedures    FOLLOW UP VISIT Appointment in: 3 - 5 Days Follow-up with your primary care provider within the next 3 to 5 days to discuss recent hospitalization any changes made to your medications. Follow-up with your primary care provider within the next 3 to 5 days to discuss recent hospitalization any changes made to your medications.      Standing Status:   Standing     Number of Occurrences:   1     Order Specific Question:   Appointment in     Answer:   3 - 5 Days Follow-up Information     Follow up With Specialties Details Why Lance Guerrero, [place] [address]  [phone]            Follow up labs/diagnostics (ultimately defer to outpatient provider): Follow-up PCP within next 3 to 5 days    Time spent in patient discharge and coordination 42 minutes. Plan was discussed with patient. All questions answered. Patient was stable at time of discharge. Instructions given to call a physician or return if any concerns. Discharge Info:   Current Discharge Medication List      START taking these medications    Details   benzonatate (TESSALON) 100 mg capsule Take 1 Capsule by mouth three (3) times daily as needed for Cough for up to 7 days. Qty: 21 Capsule, Refills: 0  Start date: 12/27/2021, End date: 1/3/2022      dexAMETHasone (DECADRON) 6 mg tablet Take 1 Tablet by mouth Daily (before breakfast) for 6 days. Qty: 6 Tablet, Refills: 0  Start date: 12/27/2021, End date: 1/2/2022      famotidine (PEPCID) 20 mg tablet Take 1 Tablet by mouth two (2) times a day for 14 days. Qty: 28 Tablet, Refills: 0  Start date: 12/27/2021, End date: 1/10/2022         CONTINUE these medications which have CHANGED    Details   DULoxetine (CYMBALTA) 30 mg capsule Take 1 Capsule by mouth daily for 7 days. Qty: 7 Capsule, Refills: 0  Start date: 12/28/2021, End date: 1/4/2022         CONTINUE these medications which have NOT CHANGED    Details   pramipexole (Mirapex) 0.125 mg tablet Take 0.125 mg by mouth daily (after dinner). diclofenac (VOLTAREN) 1 % gel Apply 1-2 g to affected area three (3) times daily. atenolol (TENORMIN) 50 mg tablet Take  by mouth daily. tramadol (ULTRAM) 50 mg tablet Take 50 mg by mouth every six (6) hours as needed. Take / use AM day of surgery if needed per anesthesia protocols.          STOP taking these medications       sulfaSALAzine (AZULFIDINE) 500 mg tablet Comments:   Reason for Stopping:         sertraline (ZOLOFT) 50 mg tablet Comments:   Reason for Stopping:               Procedures done this admission:  * No surgery found *    Consults this admission:  None    Echocardiogram/EKG results:  No results found for this or any previous visit. EKG Results     Procedure 720 Value Units Date/Time    EKG, 12 LEAD, INITIAL [[id number]]     Order Status: Completed           Diagnostic Imaging/Tests:   XR CHEST PORT    Result Date: 12/11/2021  EXAM: XR CHEST PORT HISTORY: meets SIRS criteria. TECHNIQUE: Frontal chest. COMPARISON: Multiple prior studies with most recent on FINDINGS: The cardiac silhouette, mediastinum, and pulmonary vasculature are within normal limits. Bilateral lung infiltrates are observed. There is no pleural effusion, or pneumothorax. No significant osseous abnormalities are observed. Bilateral lung infiltrates are appreciated as described above. This pattern is compatible with atypical pneumonia, including viral pneumonia. All Micro Results     Procedure Component Value Units Date/Time    BLOOD CULTURE [[id number]] Collected: 12/11/21 2256    Order Status: Completed Specimen: Blood Updated: 12/17/21 0731     Special Requests: NO SPECIAL REQUESTS        Culture result: NO GROWTH 5 DAYS       CULTURE, STOOL [[id number]] Collected: 12/12/21 1640    Order Status: Completed Specimen: Stool Updated: 12/15/21 5027     Special Requests: NO SPECIAL REQUESTS        Culture result:       No Salmonella, Shigella, or Ecoli 0157 isolated.           [facility] & Carol Fret [[id number]]     Order Status: Canceled Specimen: Feces from Stool     COVID-19 RAPID TEST [[id number]]  (Abnormal) Collected: 12/12/21 1346    Order Status: Completed Specimen: Nasopharyngeal Updated: 12/12/21 1411     Specimen source NASAL        Comment: RAPID ONLY        COVID-19 rapid test Detected        Comment: RESULTS VERIFIED, PHONED TO AND READ BACK BY  Charli Esteban RN ON 12/12/21 @1410, TA The specimen is POSITIVE for SARS-CoV-2, the novel coronavirus associated with COVID-19. This test has been authorized by the FDA under an Emergency Use Authorization (EUA) for use by authorized laboratories.         Fact sheet for Healthcare Providers: ConventionUpdate.co.nz  Fact sheet for Patients: ConventionUpdate.co.nz       Methodology: Isothermal Nucleic Acid Amplification         BLOOD CULTURE [[id number]]     Order Status: Canceled Specimen: Blood           Labs: Results:       BMP, Mg, Phos Recent Labs     12/27/21  0700 12/26/21  0737 12/25/21  0753    136 137   K 4.4 4.4 4.5   CL 99 99 103   CO2 33* 34* 28   AGAP 5* 3* 6*   BUN 21 19 18   CREA 0.74 0.68 0.67   CA 9.1 9.1 9.1   GLU 93 79 79   MG 2.5* 2.5* 2.5*      CBC Recent Labs     12/27/21  0700 12/26/21  0737 12/25/21  0753   WBC 13.4* 11.9* 13.9*   RBC 4.66 4.65 4.85   HGB 13.4 13.3 13.7   HCT 41.0 40.2 42.1    442 375      LFT Recent Labs     12/27/21  0700 12/26/21  0737 12/25/21  0753   ALT 53 52 52   AP 59 54 55   TP 5.9* 5.8* 5.8*   ALB 2.5* 2.6* 2.3*   GLOB 3.4 3.2 3.5   AGRAT 0.7* 0.8* 0.7*      Cardiac Testing No results found for: BNPP, BNP, CPK, RCK1, RCK2, RCK3, RCK4, CKMB, CKNDX, CKND1, TROPT, TROIQ   Coagulation Tests Lab Results   Component Value Date/Time    Prothrombin time 13.1 12/12/2021 06:37 AM    INR 1.0 12/12/2021 06:37 AM    aPTT 32.5 12/12/2021 06:37 AM      A1c No results found for: HBA1C, IGE2ZVFP   Lipid Panel Lab Results   Component Value Date/Time    Cholesterol, total 159 03/03/2017 08:21 AM    HDL Cholesterol 64 03/03/2017 08:21 AM    LDL, calculated 83 03/03/2017 08:21 AM    VLDL, calculated 12 03/03/2017 08:21 AM    Triglyceride 58 03/03/2017 08:21 AM      Thyroid Panel Lab Results   Component Value Date/Time    TSH 1.590 03/03/2017 08:21 AM        Most Recent UA Lab Results   Component Value Date/Time    Color Yellow 03/10/2015 04:00 PM    Appearance Clear 03/10/2015 04:00 PM    pH (UA) 5.5 03/10/2015 04:00 PM    Ketone Negative 03/10/2015 04:00 PM    Bilirubin Negative 03/10/2015 04:00 PM    Blood Negative 03/10/2015 04:00 PM    Nitrites Negative 03/10/2015 04:00 PM    Leukocyte Esterase Negative 03/10/2015 04:00 PM    WBC >100 (H) 11/15/2018 09:18 AM    RBC >100 (H) 11/15/2018 09:18 AM    Epithelial cells 10-20 11/15/2018 09:18 AM    Bacteria 0 11/15/2018 09:18 AM    Casts 0-3 11/15/2018 09:18 AM    Crystals, urine 0 08/08/2011 08:40 PM    Mucus 0 08/08/2011 08:40 PM          All Labs from Last 24 Hrs:  Recent Results (from the past 24 hour(s))   CBC W/O DIFF    Collection Time: 12/27/21  7:00 AM   Result Value Ref Range    WBC 13.4 (H) 4.3 - 11.1 K/uL    RBC 4.66 4.05 - 5.2 M/uL    HGB 13.4 11.7 - 15.4 g/dL    HCT 41.0 35.8 - 46.3 %    MCV 88.0 79.6 - 97.8 FL    MCH 28.8 26.1 - 32.9 PG    MCHC 32.7 31.4 - 35.0 g/dL    RDW 12.1 11.9 - 14.6 %    PLATELET 114 291 - 865 K/uL    MPV 9.7 9.4 - 12.3 FL    ABSOLUTE NRBC 0.00 0.0 - 0.2 K/uL   METABOLIC PANEL, COMPREHENSIVE    Collection Time: 12/27/21  7:00 AM   Result Value Ref Range    Sodium 137 136 - 145 mmol/L    Potassium 4.4 3.5 - 5.1 mmol/L    Chloride 99 98 - 107 mmol/L    CO2 33 (H) 21 - 32 mmol/L    Anion gap 5 (L) 7 - 16 mmol/L    Glucose 93 65 - 100 mg/dL    BUN 21 6 - 23 MG/DL    Creatinine 0.74 0.6 - 1.0 MG/DL    GFR est AA >60 >60 ml/min/1.73m2    GFR est non-AA >60 >60 ml/min/1.73m2    Calcium 9.1 8.3 - 10.4 MG/DL    Bilirubin, total 0.3 0.2 - 1.1 MG/DL    ALT (SGPT) 53 12 - 65 U/L    AST (SGOT) 12 (L) 15 - 37 U/L    Alk.  phosphatase 59 50 - 136 U/L    Protein, total 5.9 (L) 6.3 - 8.2 g/dL    Albumin 2.5 (L) 3.5 - 5.0 g/dL    Globulin 3.4 2.3 - 3.5 g/dL    A-G Ratio 0.7 (L) 1.2 - 3.5     MAGNESIUM    Collection Time: 12/27/21  7:00 AM   Result Value Ref Range    Magnesium 2.5 (H) 1.8 - 2.4 mg/dL       Current Med List in Hospital:   Current Facility-Administered Medications   Medication Dose Route Frequency  dexamethasone (DECADRON) 10 mg/mL injection 10 mg  10 mg IntraVENous Q24H    albuterol (PROVENTIL HFA, VENTOLIN HFA, PROAIR HFA) inhaler 2 Puff  2 Puff Inhalation Q6H RT    famotidine (PEPCID) tablet 20 mg  20 mg Oral BID    DULoxetine (CYMBALTA) capsule 30 mg  30 mg Oral DAILY    atenoloL (TENORMIN) tablet 50 mg  50 mg Oral DAILY    traMADoL (ULTRAM) tablet 50 mg  50 mg Oral Q6H PRN    sodium chloride (NS) flush 5-40 mL  5-40 mL IntraVENous PRN    acetaminophen (TYLENOL) tablet 650 mg  650 mg Oral Q6H PRN    Or    acetaminophen (TYLENOL) suppository 650 mg  650 mg Rectal Q6H PRN    polyethylene glycol (MIRALAX) packet 17 g  17 g Oral DAILY PRN    ondansetron (ZOFRAN ODT) tablet 4 mg  4 mg Oral Q8H PRN    Or    ondansetron (ZOFRAN) injection 4 mg  4 mg IntraVENous Q6H PRN    guaiFENesin-dextromethorphan (ROBITUSSIN DM) 100-10 mg/5 mL syrup 5 mL  5 mL Oral Q4H PRN    benzonatate (TESSALON) capsule 100 mg  100 mg Oral TID PRN    enoxaparin (LOVENOX) injection 30 mg  30 mg SubCUTAneous Q12H    diclofenac (VOLTAREN) 1 % topical gel 1-2 g  1-2 g Topical TID PRN    sodium chloride (NS) flush 5-10 mL  5-10 mL IntraVENous Q8H    sodium chloride (NS) flush 5-10 mL  5-10 mL IntraVENous PRN       Allergies   Allergen Reactions    Chlorhexidine Rash    Codeine Other (comments)     \"flushed\"   \"heart palpitations\"    Hibiclens [Chlorhexidine Gluconate] Rash    Prednisone Palpitations       There is no immunization history on file for this patient.     Recent Vital Data:  Patient Vitals for the past 24 hrs:   Temp Pulse Resp BP SpO2   12/27/21 1059 97.6 °F (36.4 °C) 88 20 94/69 93 %   12/27/21 0802     100 %   12/27/21 0726 97.8 °F (36.6 °C) 78 20 124/77 98 %   12/27/21 0417 97.7 °F (36.5 °C) 66 18 (!) 116/55 98 %   12/27/21 0315     97 %   12/26/21 2341 98.1 °F (36.7 °C) 64 16 121/67 98 %   12/26/21 2122     98 %   12/26/21 2006 98.1 °F (36.7 °C) 71 16 109/61 97 %   12/26/21 1955     97 % 12/26/21 1533 98.1 °F (36.7 °C) 75 20 116/82 98 %   12/26/21 1412     96 %     Oxygen Therapy  O2 Sat (%): 93 % (12/27/21 1059)  Pulse via Oximetry: 81 beats per minute (12/27/21 0802)  O2 Device: Hi flow nasal cannula (12/27/21 0802)  O2 Flow Rate (L/min): 3 l/min (decreased to 1.5L) (12/27/21 0802)  FIO2 (%): 93 % (12/23/21 1416)    Estimated body mass index is 34.49 kg/m² as calculated from the following:    Height as of this encounter: 5' 6\" (1.676 m). Weight as of this encounter: 96.9 kg (213 lb 11.2 oz). Intake/Output Summary (Last 24 hours) at 12/27/2021 1303  Last data filed at 12/27/2021 1005  Gross per 24 hour   Intake 240 ml   Output 300 ml   Net -60 ml         Physical Exam:  General:    Well nourished. No overt distress  Head:  Normocephalic, atraumatic  Eyes:  Sclerae appear normal.  Pupils equally round. HENT:  Nares appear normal, no drainage. Moist mucous membranes  Neck:  No restricted ROM. Trachea midline  CV:   RRR. No m/r/g. No JVD  Lungs:   Decreased breath sounds. No wheezing, rhonchi, or rales. Even, unlabored  Abdomen:   Soft, nontender, nondistended. Extremities: Warm and dry. No cyanosis or clubbing. No edema. Skin:     No rashes. Normal coloration  Neuro:  CN II-XII grossly intact. Psych:  Normal mood and affect. Signed:  Reji Nicholas MD    Part of this note may have been written by using a voice dictation software. The note has been proof read but may still contain some grammatical/other typographical errors.

## 2021-12-27 NOTE — PROGRESS NOTES
Resting room air sat is 85%  Resting with oxygen at 2L is 98%  Ambulating on 2L is 83%  Ambulating on 3L is 86%    Ambulating on 4l is 94%

## 2021-12-28 ENCOUNTER — HOME CARE VISIT (OUTPATIENT)
Dept: SCHEDULING | Facility: HOME HEALTH | Age: 56
End: 2021-12-28
Payer: COMMERCIAL

## 2021-12-28 VITALS
OXYGEN SATURATION: 99 % | SYSTOLIC BLOOD PRESSURE: 110 MMHG | TEMPERATURE: 97.3 F | HEART RATE: 60 BPM | RESPIRATION RATE: 17 BRPM | DIASTOLIC BLOOD PRESSURE: 78 MMHG

## 2021-12-28 PROCEDURE — 400013 HH SOC

## 2021-12-28 PROCEDURE — G0151 HHCP-SERV OF PT,EA 15 MIN: HCPCS

## 2021-12-30 ENCOUNTER — TELEPHONE (OUTPATIENT)
Dept: HOME HEALTH SERVICES | Facility: HOME HEALTH | Age: 56
End: 2021-12-30

## 2021-12-30 NOTE — TELEPHONE ENCOUNTER
[address] [facility]sey Pharmacist consult. Ms. Brenda Kya was discharged from [facility] after having PNA due to COVID-19. I explained my part of the [facility] team.  I reviewed her discharge medications. I emphasized the need to finish the Decadron and the Pepcid will help with any stomach issues. She is having swelling in one foot which she has shown to the PT who recommended icing it. She has diclofenac gel and I suggested she might try it. She still has some Tramadol, so could take if foot pain gets too bad. I reminded her of her MD appointment 1/4/22. She is using her O2 and does get SOB when first getting up in the morning. I gave her my cell phone number and asked to be called with any medication questions or issues. She said OK to call back any time.

## 2022-01-01 ENCOUNTER — HOME CARE VISIT (OUTPATIENT)
Dept: SCHEDULING | Facility: HOME HEALTH | Age: 57
End: 2022-01-01
Payer: COMMERCIAL

## 2022-01-01 VITALS
SYSTOLIC BLOOD PRESSURE: 122 MMHG | TEMPERATURE: 97.4 F | DIASTOLIC BLOOD PRESSURE: 78 MMHG | HEART RATE: 78 BPM | OXYGEN SATURATION: 94 % | RESPIRATION RATE: 16 BRPM

## 2022-01-01 PROCEDURE — G0299 HHS/HOSPICE OF RN EA 15 MIN: HCPCS

## 2022-01-03 ENCOUNTER — HOME CARE VISIT (OUTPATIENT)
Dept: SCHEDULING | Facility: HOME HEALTH | Age: 57
End: 2022-01-03
Payer: COMMERCIAL

## 2022-01-03 PROCEDURE — G0299 HHS/HOSPICE OF RN EA 15 MIN: HCPCS

## 2022-01-04 VITALS
TEMPERATURE: 97.5 F | DIASTOLIC BLOOD PRESSURE: 70 MMHG | OXYGEN SATURATION: 99 % | SYSTOLIC BLOOD PRESSURE: 128 MMHG | HEART RATE: 72 BPM

## 2022-01-06 ENCOUNTER — HOME CARE VISIT (OUTPATIENT)
Dept: SCHEDULING | Facility: HOME HEALTH | Age: 57
End: 2022-01-06
Payer: COMMERCIAL

## 2022-01-06 PROCEDURE — G0299 HHS/HOSPICE OF RN EA 15 MIN: HCPCS

## 2022-01-07 ENCOUNTER — HOME CARE VISIT (OUTPATIENT)
Dept: SCHEDULING | Facility: HOME HEALTH | Age: 57
End: 2022-01-07
Payer: COMMERCIAL

## 2022-01-07 ENCOUNTER — TELEPHONE (OUTPATIENT)
Dept: HOME HEALTH SERVICES | Facility: HOME HEALTH | Age: 57
End: 2022-01-07

## 2022-01-07 VITALS
OXYGEN SATURATION: 100 % | HEART RATE: 76 BPM | RESPIRATION RATE: 16 BRPM | DIASTOLIC BLOOD PRESSURE: 78 MMHG | SYSTOLIC BLOOD PRESSURE: 110 MMHG | TEMPERATURE: 97.3 F

## 2022-01-07 VITALS
RESPIRATION RATE: 17 BRPM | OXYGEN SATURATION: 97 % | HEART RATE: 65 BPM | TEMPERATURE: 97.4 F | DIASTOLIC BLOOD PRESSURE: 82 MMHG | SYSTOLIC BLOOD PRESSURE: 112 MMHG

## 2022-01-07 PROCEDURE — G0151 HHCP-SERV OF PT,EA 15 MIN: HCPCS

## 2022-01-07 NOTE — TELEPHONE ENCOUNTER
Barbara Case said she was feeling pretty good. She saw her PCP on Tuesday and got a good report except she now has a UTI. MD put her on Keflex for 5 days and she is feeling better already. He also referred her to a pulmonologist to be sure her lungs had recovered from the COVID-19. I reviewed her medications. MD did not change any of her maintenance meds. I asked her to call with any medication questions.

## 2022-01-11 ENCOUNTER — HOME CARE VISIT (OUTPATIENT)
Dept: SCHEDULING | Facility: HOME HEALTH | Age: 57
End: 2022-01-11
Payer: COMMERCIAL

## 2022-01-11 VITALS
HEART RATE: 78 BPM | SYSTOLIC BLOOD PRESSURE: 124 MMHG | OXYGEN SATURATION: 97 % | DIASTOLIC BLOOD PRESSURE: 70 MMHG | TEMPERATURE: 97.8 F | RESPIRATION RATE: 16 BRPM

## 2022-01-11 PROCEDURE — G0157 HHC PT ASSISTANT EA 15: HCPCS

## 2022-01-11 PROCEDURE — G0299 HHS/HOSPICE OF RN EA 15 MIN: HCPCS

## 2022-01-12 VITALS
DIASTOLIC BLOOD PRESSURE: 84 MMHG | OXYGEN SATURATION: 96 % | HEART RATE: 78 BPM | SYSTOLIC BLOOD PRESSURE: 128 MMHG | TEMPERATURE: 97.9 F | RESPIRATION RATE: 17 BRPM

## 2022-01-13 ENCOUNTER — HOME CARE VISIT (OUTPATIENT)
Dept: SCHEDULING | Facility: HOME HEALTH | Age: 57
End: 2022-01-13
Payer: COMMERCIAL

## 2022-01-13 VITALS
DIASTOLIC BLOOD PRESSURE: 91 MMHG | HEART RATE: 73 BPM | OXYGEN SATURATION: 97 % | RESPIRATION RATE: 18 BRPM | TEMPERATURE: 97 F | SYSTOLIC BLOOD PRESSURE: 130 MMHG

## 2022-01-13 PROCEDURE — G0299 HHS/HOSPICE OF RN EA 15 MIN: HCPCS

## 2022-01-14 ENCOUNTER — HOME CARE VISIT (OUTPATIENT)
Dept: HOME HEALTH SERVICES | Facility: HOME HEALTH | Age: 57
End: 2022-01-14
Payer: COMMERCIAL

## 2022-01-14 ENCOUNTER — HOME CARE VISIT (OUTPATIENT)
Dept: SCHEDULING | Facility: HOME HEALTH | Age: 57
End: 2022-01-14
Payer: COMMERCIAL

## 2022-01-14 ENCOUNTER — TELEPHONE (OUTPATIENT)
Dept: HOME HEALTH SERVICES | Facility: HOME HEALTH | Age: 57
End: 2022-01-14

## 2022-01-14 VITALS
DIASTOLIC BLOOD PRESSURE: 88 MMHG | SYSTOLIC BLOOD PRESSURE: 148 MMHG | RESPIRATION RATE: 16 BRPM | HEART RATE: 78 BPM | TEMPERATURE: 97.8 F

## 2022-01-14 PROCEDURE — G0157 HHC PT ASSISTANT EA 15: HCPCS

## 2022-01-17 ENCOUNTER — HOME CARE VISIT (OUTPATIENT)
Dept: HOME HEALTH SERVICES | Facility: HOME HEALTH | Age: 57
End: 2022-01-17
Payer: COMMERCIAL

## 2022-01-17 ENCOUNTER — HOME CARE VISIT (OUTPATIENT)
Dept: SCHEDULING | Facility: HOME HEALTH | Age: 57
End: 2022-01-17
Payer: COMMERCIAL

## 2022-01-17 VITALS
SYSTOLIC BLOOD PRESSURE: 128 MMHG | OXYGEN SATURATION: 98 % | RESPIRATION RATE: 17 BRPM | TEMPERATURE: 98.7 F | DIASTOLIC BLOOD PRESSURE: 80 MMHG | HEART RATE: 74 BPM

## 2022-01-17 PROCEDURE — G0299 HHS/HOSPICE OF RN EA 15 MIN: HCPCS

## 2022-01-18 ENCOUNTER — HOME CARE VISIT (OUTPATIENT)
Dept: SCHEDULING | Facility: HOME HEALTH | Age: 57
End: 2022-01-18
Payer: COMMERCIAL

## 2022-01-18 VITALS
HEART RATE: 78 BPM | SYSTOLIC BLOOD PRESSURE: 148 MMHG | TEMPERATURE: 97.8 F | RESPIRATION RATE: 16 BRPM | DIASTOLIC BLOOD PRESSURE: 80 MMHG

## 2022-01-18 PROCEDURE — G0157 HHC PT ASSISTANT EA 15: HCPCS

## 2022-01-20 ENCOUNTER — TELEPHONE (OUTPATIENT)
Dept: HOME HEALTH SERVICES | Facility: HOME HEALTH | Age: 57
End: 2022-01-20

## 2022-01-20 NOTE — TELEPHONE ENCOUNTER
Mrs. Danelle Kessler said she was doing good today. She is just on her usual maintenance medications and no questions. She is tired of being on the O2, but when she takes it off and tries to walk around, she has drop in O2 level. She is scheduled for an oxygen test and a chest x-ray tomorrow. She will then have a virtual visit with her pulmonologist on Monday. She sounded very . I asked her to call me with any medication questions or issues.

## 2022-01-21 ENCOUNTER — HOME CARE VISIT (OUTPATIENT)
Dept: SCHEDULING | Facility: HOME HEALTH | Age: 57
End: 2022-01-21
Payer: COMMERCIAL

## 2022-01-21 VITALS
DIASTOLIC BLOOD PRESSURE: 68 MMHG | SYSTOLIC BLOOD PRESSURE: 126 MMHG | RESPIRATION RATE: 16 BRPM | TEMPERATURE: 98.1 F | HEART RATE: 94 BPM | OXYGEN SATURATION: 96 %

## 2022-01-21 PROCEDURE — G0151 HHCP-SERV OF PT,EA 15 MIN: HCPCS

## 2022-01-25 ENCOUNTER — HOME CARE VISIT (OUTPATIENT)
Dept: SCHEDULING | Facility: HOME HEALTH | Age: 57
End: 2022-01-25
Payer: COMMERCIAL

## 2022-01-25 PROCEDURE — G0299 HHS/HOSPICE OF RN EA 15 MIN: HCPCS

## 2022-01-26 ENCOUNTER — TELEPHONE (OUTPATIENT)
Dept: HOME HEALTH SERVICES | Facility: HOME HEALTH | Age: 57
End: 2022-01-26

## 2022-01-26 VITALS
OXYGEN SATURATION: 98 % | SYSTOLIC BLOOD PRESSURE: 132 MMHG | TEMPERATURE: 98.2 F | DIASTOLIC BLOOD PRESSURE: 70 MMHG | RESPIRATION RATE: 16 BRPM | HEART RATE: 72 BPM

## 2022-01-26 NOTE — TELEPHONE ENCOUNTER
MsDary Jeana Marsha said she had her chest x-ray and virtual visit with MD.  MD said her x-ray showed scarring from the [place]. Her breathing should improve, but it may take several more months on the O2. He did schedule her for a walking test to see how well she can do with and without her O2. She said she had never smoked, so is disappointed that the [place] has damaged her lungs. I wished her the best and asked her to call with any medication questions any time.

## 2022-03-18 PROBLEM — L40.50 PSORIATIC ARTHRITIS (HCC): Status: ACTIVE | Noted: 2021-12-12

## 2022-03-19 PROBLEM — Z90.49 S/P LAPAROSCOPIC CHOLECYSTECTOMY: Status: ACTIVE | Noted: 2018-12-19

## 2022-03-19 PROBLEM — E66.01 SEVERE OBESITY (HCC): Status: ACTIVE | Noted: 2018-11-21

## 2022-03-20 PROBLEM — J12.82 PNEUMONIA DUE TO COVID-19 VIRUS: Status: ACTIVE | Noted: 2021-12-12

## 2022-03-20 PROBLEM — U07.1 PNEUMONIA DUE TO COVID-19 VIRUS: Status: ACTIVE | Noted: 2021-12-12

## 2022-05-24 ENCOUNTER — HOSPITAL ENCOUNTER (OUTPATIENT)
Dept: GENERAL RADIOLOGY | Age: 57
Discharge: HOME | End: 2022-05-27
Payer: COMMERCIAL

## 2022-05-24 DIAGNOSIS — M06.4 INFLAMMATORY POLYARTHRITIS (HCC): ICD-10-CM

## 2022-05-24 PROCEDURE — 73120 X-RAY EXAM OF HAND: CPT

## 2022-05-24 PROCEDURE — 73630 X-RAY EXAM OF FOOT: CPT

## 2022-08-24 ENCOUNTER — HOSPITAL ENCOUNTER (OUTPATIENT)
Dept: GENERAL RADIOLOGY | Age: 57
Discharge: HOME | End: 2022-08-27
Payer: COMMERCIAL

## 2022-08-24 DIAGNOSIS — M06.4 INFLAMMATORY POLYARTHRITIS (HCC): ICD-10-CM

## 2022-08-24 PROCEDURE — 72202 X-RAY EXAM SI JOINTS 3/> VWS: CPT

## 2022-11-10 ENCOUNTER — HOSPITAL ENCOUNTER (OUTPATIENT)
Dept: GENERAL RADIOLOGY | Age: 57
Discharge: HOME | End: 2022-11-13
Payer: COMMERCIAL

## 2022-11-10 DIAGNOSIS — M06.4 INFLAMMATORY POLYARTHRITIS (HCC): ICD-10-CM

## 2022-11-10 PROCEDURE — 72050 X-RAY EXAM NECK SPINE 4/5VWS: CPT

## 2022-11-10 PROCEDURE — 72110 X-RAY EXAM L-2 SPINE 4/>VWS: CPT

## 2022-11-10 PROCEDURE — 72072 X-RAY EXAM THORAC SPINE 3VWS: CPT

## 2022-11-10 PROCEDURE — 72100 X-RAY EXAM L-S SPINE 2/3 VWS: CPT

## 2023-04-21 DIAGNOSIS — Z12.39 SCREENING BREAST EXAMINATION: Primary | ICD-10-CM

## 2023-07-25 ENCOUNTER — HOSPITAL ENCOUNTER (OUTPATIENT)
Dept: GENERAL RADIOLOGY | Age: 58
Discharge: HOME | End: 2023-07-28
Payer: COMMERCIAL

## 2023-07-25 DIAGNOSIS — M06.09 RHEUMATOID ARTHRITIS OF MULTIPLE SITES WITH NEGATIVE RHEUMATOID FACTOR (HCC): ICD-10-CM

## 2023-07-25 PROCEDURE — 73630 X-RAY EXAM OF FOOT: CPT

## 2023-07-25 PROCEDURE — 73120 X-RAY EXAM OF HAND: CPT

## 2024-11-17 ENCOUNTER — APPOINTMENT (OUTPATIENT)
Dept: CT IMAGING | Age: 59
End: 2024-11-17
Payer: MEDICARE

## 2024-11-17 ENCOUNTER — HOSPITAL ENCOUNTER (EMERGENCY)
Age: 59
Discharge: HOME | End: 2024-11-17
Attending: EMERGENCY MEDICINE
Payer: MEDICARE

## 2024-11-17 VITALS
SYSTOLIC BLOOD PRESSURE: 146 MMHG | HEART RATE: 84 BPM | RESPIRATION RATE: 18 BRPM | DIASTOLIC BLOOD PRESSURE: 86 MMHG | OXYGEN SATURATION: 97 % | TEMPERATURE: 98 F

## 2024-11-17 DIAGNOSIS — M54.50 ACUTE BILATERAL LOW BACK PAIN WITHOUT SCIATICA: Primary | ICD-10-CM

## 2024-11-17 PROCEDURE — 99284 EMERGENCY DEPT VISIT MOD MDM: CPT

## 2024-11-17 PROCEDURE — 6360000002 HC RX W HCPCS: Performed by: EMERGENCY MEDICINE

## 2024-11-17 PROCEDURE — 72131 CT LUMBAR SPINE W/O DYE: CPT

## 2024-11-17 PROCEDURE — 6370000000 HC RX 637 (ALT 250 FOR IP): Performed by: EMERGENCY MEDICINE

## 2024-11-17 PROCEDURE — 96372 THER/PROPH/DIAG INJ SC/IM: CPT

## 2024-11-17 RX ORDER — HYDROCODONE BITARTRATE AND ACETAMINOPHEN 10; 325 MG/1; MG/1
1 TABLET ORAL
Status: COMPLETED | OUTPATIENT
Start: 2024-11-17 | End: 2024-11-17

## 2024-11-17 RX ORDER — KETOROLAC TROMETHAMINE 30 MG/ML
30 INJECTION, SOLUTION INTRAMUSCULAR; INTRAVENOUS
Status: COMPLETED | OUTPATIENT
Start: 2024-11-17 | End: 2024-11-17

## 2024-11-17 RX ORDER — HYDROCODONE BITARTRATE AND ACETAMINOPHEN 7.5; 325 MG/1; MG/1
1 TABLET ORAL EVERY 6 HOURS PRN
Qty: 15 TABLET | Refills: 0 | Status: SHIPPED | OUTPATIENT
Start: 2024-11-17 | End: 2024-11-22

## 2024-11-17 RX ORDER — DEXAMETHASONE SODIUM PHOSPHATE 10 MG/ML
8 INJECTION INTRAMUSCULAR; INTRAVENOUS
Status: COMPLETED | OUTPATIENT
Start: 2024-11-17 | End: 2024-11-17

## 2024-11-17 RX ORDER — CYCLOBENZAPRINE HCL 10 MG
10 TABLET ORAL 3 TIMES DAILY PRN
Qty: 21 TABLET | Refills: 0 | Status: SHIPPED | OUTPATIENT
Start: 2024-11-17 | End: 2024-11-27

## 2024-11-17 RX ADMIN — HYDROCODONE BITARTRATE AND ACETAMINOPHEN 1 TABLET: 10; 325 TABLET ORAL at 16:28

## 2024-11-17 RX ADMIN — DEXAMETHASONE SODIUM PHOSPHATE 8 MG: 10 INJECTION INTRAMUSCULAR; INTRAVENOUS at 14:40

## 2024-11-17 RX ADMIN — KETOROLAC TROMETHAMINE 30 MG: 30 INJECTION, SOLUTION INTRAMUSCULAR at 14:41

## 2024-11-17 ASSESSMENT — LIFESTYLE VARIABLES
HOW MANY STANDARD DRINKS CONTAINING ALCOHOL DO YOU HAVE ON A TYPICAL DAY: PATIENT DOES NOT DRINK
HOW OFTEN DO YOU HAVE A DRINK CONTAINING ALCOHOL: NEVER

## 2024-11-17 ASSESSMENT — PAIN - FUNCTIONAL ASSESSMENT: PAIN_FUNCTIONAL_ASSESSMENT: 0-10

## 2024-11-17 ASSESSMENT — PAIN DESCRIPTION - LOCATION: LOCATION: BACK

## 2024-11-17 ASSESSMENT — PAIN SCALES - GENERAL
PAINLEVEL_OUTOF10: 10

## 2024-11-17 ASSESSMENT — PAIN DESCRIPTION - ORIENTATION: ORIENTATION: LOWER

## 2024-11-17 NOTE — ED PROVIDER NOTES
Emergency Department Provider Note       PCP: Jaz Sigala MD   Age: 59 y.o.   Sex: female     DISPOSITION Decision To Discharge 11/17/2024 03:37:12 PM    ICD-10-CM    1. Acute bilateral low back pain without sciatica  M54.50 HYDROcodone-acetaminophen (NORCO) 7.5-325 MG per tablet          Medical Decision Making     Patient with lower back pain.  CT shows degenerative disease but no fracture.  Patient treated here with pain medication and will discharge home with medication for PCP follow-up.  Referring to a chiropractor and may benefit from physical therapy.  No saddle anesthesia or change in bowel or bladder function.     1 or more acute illnesses that pose a threat to life or bodily function.   Prescription drug management performed.  Parental controlled substances given in the ED.  Patient was discharged risks and benefits of hospitalization were considered.  Shared medical decision making was utilized in creating the patients health plan today.  I independently ordered and reviewed each unique test.    I reviewed external records: provider visit note from PCP.       I interpreted the CT Scan no fracture.              History     Patient with bilateral lower back pain for the past week.  Getting worse instead of better after treatment at [facility].  Had a negative x-ray there.  No previous injury or fall.  No heavy lifting.  No saddle anesthesia or change in bowel or bladder function.    The history is provided by the patient. No  was used.   Back Pain  Location:  Lumbar spine  Quality:  Aching  Radiates to:  Does not radiate  Pain severity:  Moderate  Duration:  1 week  Timing:  Constant  Progression:  Worsening  Chronicity:  New  Context: not falling, not lifting heavy objects and not recent injury    Relieved by:  Being still  Worsened by:  Movement  Associated symptoms: no abdominal pain, no abdominal swelling, no bladder incontinence, no bowel incontinence, no

## 2024-11-17 NOTE — ED TRIAGE NOTES
Patient reports of lower back pain for a week- non resolving-- urgent care treated - no improvement.

## 2024-11-17 NOTE — ED NOTES
Patient mobility status  with no difficulty.     I have reviewed discharge instructions with the patient and spouse.  The patient and spouse verbalized understanding.    Patient left ED via Discharge Method: ambulatory to Home with Significant Other.    Opportunity for questions and clarification provided.     Patient given 2 scripts.            Soha Robb, RN  11/17/24 [id number]

## 2025-05-13 LAB
ALBUMIN SERPL-MCNC: 3.4 G/DL (ref 3.5–5)
ALBUMIN/GLOB SERPL: 1.1 (ref 1–1.9)
ALP SERPL-CCNC: 76 U/L (ref 35–104)
ALT SERPL-CCNC: 20 U/L (ref 8–45)
ANION GAP SERPL CALC-SCNC: 10 MMOL/L (ref 7–16)
AST SERPL-CCNC: 19 U/L (ref 15–37)
BASOPHILS # BLD: 0.02 K/UL (ref 0–0.2)
BASOPHILS NFR BLD: 0.3 % (ref 0–2)
BILIRUB SERPL-MCNC: 0.3 MG/DL (ref 0–1.2)
BUN SERPL-MCNC: 13 MG/DL (ref 6–23)
CALCIUM SERPL-MCNC: 9.3 MG/DL (ref 8.8–10.2)
CHLORIDE SERPL-SCNC: 105 MMOL/L (ref 98–107)
CO2 SERPL-SCNC: 24 MMOL/L (ref 20–29)
CREAT SERPL-MCNC: 0.8 MG/DL (ref 0.6–1.1)
DIFFERENTIAL METHOD BLD: NORMAL
EOSINOPHIL # BLD: 0.12 K/UL (ref 0–0.8)
EOSINOPHIL NFR BLD: 1.6 % (ref 0.5–7.8)
ERYTHROCYTE [DISTWIDTH] IN BLOOD BY AUTOMATED COUNT: 12.7 % (ref 11.9–14.6)
ERYTHROCYTE [SEDIMENTATION RATE] IN BLOOD: 1 MM/HR (ref 0–30)
GLOBULIN SER CALC-MCNC: 3.1 G/DL (ref 2.3–3.5)
GLUCOSE SERPL-MCNC: 89 MG/DL (ref 70–99)
HCT VFR BLD AUTO: 39.5 % (ref 35.8–46.3)
HGB BLD-MCNC: 13.2 G/DL (ref 11.7–15.4)
IMM GRANULOCYTES # BLD AUTO: 0.02 K/UL (ref 0–0.5)
IMM GRANULOCYTES NFR BLD AUTO: 0.3 % (ref 0–5)
LYMPHOCYTES # BLD: 1.53 K/UL (ref 0.5–4.6)
LYMPHOCYTES NFR BLD: 20.3 % (ref 13–44)
MCH RBC QN AUTO: 30.1 PG (ref 26.1–32.9)
MCHC RBC AUTO-ENTMCNC: 33.4 G/DL (ref 31.4–35)
MCV RBC AUTO: 90.2 FL (ref 82–102)
MONOCYTES # BLD: 0.64 K/UL (ref 0.1–1.3)
MONOCYTES NFR BLD: 8.5 % (ref 4–12)
NEUTS SEG # BLD: 5.2 K/UL (ref 1.7–8.2)
NEUTS SEG NFR BLD: 69 % (ref 43–78)
NRBC # BLD: 0 K/UL (ref 0–0.2)
PLATELET # BLD AUTO: 244 K/UL (ref 150–450)
PMV BLD AUTO: 10.2 FL (ref 9.4–12.3)
POTASSIUM SERPL-SCNC: 4.3 MMOL/L (ref 3.5–5.1)
PROT SERPL-MCNC: 6.5 G/DL (ref 6.3–8.2)
RBC # BLD AUTO: 4.38 M/UL (ref 4.05–5.2)
SODIUM SERPL-SCNC: 139 MMOL/L (ref 136–145)
WBC # BLD AUTO: 7.5 K/UL (ref 4.3–11.1)

## 2025-07-12 ENCOUNTER — APPOINTMENT (OUTPATIENT)
Dept: GENERAL RADIOLOGY | Age: 60
End: 2025-07-12
Payer: MEDICARE

## 2025-07-12 ENCOUNTER — HOSPITAL ENCOUNTER (EMERGENCY)
Age: 60
Discharge: HOME | End: 2025-07-12
Attending: STUDENT IN AN ORGANIZED HEALTH CARE EDUCATION/TRAINING PROGRAM
Payer: MEDICARE

## 2025-07-12 VITALS
SYSTOLIC BLOOD PRESSURE: 95 MMHG | BODY MASS INDEX: 36.65 KG/M2 | RESPIRATION RATE: 23 BRPM | HEART RATE: 87 BPM | TEMPERATURE: 100.2 F | HEIGHT: 65 IN | OXYGEN SATURATION: 94 % | DIASTOLIC BLOOD PRESSURE: 73 MMHG | WEIGHT: 220 LBS

## 2025-07-12 DIAGNOSIS — I51.7 CARDIOMEGALY: ICD-10-CM

## 2025-07-12 DIAGNOSIS — J06.9 VIRAL URI WITH COUGH: Primary | ICD-10-CM

## 2025-07-12 LAB
ALBUMIN SERPL-MCNC: 3.2 G/DL (ref 3.2–4.6)
ALBUMIN/GLOB SERPL: 0.9 (ref 1–1.9)
ALP SERPL-CCNC: 67 U/L (ref 35–104)
ALT SERPL-CCNC: 40 U/L (ref 8–45)
ANION GAP SERPL CALC-SCNC: 13 MMOL/L (ref 7–16)
AST SERPL-CCNC: 33 U/L (ref 15–37)
BASOPHILS # BLD: 0.03 K/UL (ref 0–0.2)
BASOPHILS NFR BLD: 0.4 % (ref 0–2)
BILIRUB SERPL-MCNC: 0.3 MG/DL (ref 0–1.2)
BUN SERPL-MCNC: 9 MG/DL (ref 8–23)
CALCIUM SERPL-MCNC: 9 MG/DL (ref 8.8–10.2)
CHLORIDE SERPL-SCNC: 102 MMOL/L (ref 98–107)
CO2 SERPL-SCNC: 21 MMOL/L (ref 20–29)
CREAT SERPL-MCNC: 1.04 MG/DL (ref 0.6–1.1)
DIFFERENTIAL METHOD BLD: ABNORMAL
EOSINOPHIL # BLD: 0.02 K/UL (ref 0–0.8)
EOSINOPHIL NFR BLD: 0.3 % (ref 0.5–7.8)
ERYTHROCYTE [DISTWIDTH] IN BLOOD BY AUTOMATED COUNT: 12.5 % (ref 11.9–14.6)
FLUAV RNA SPEC QL NAA+PROBE: NOT DETECTED
FLUBV RNA SPEC QL NAA+PROBE: NOT DETECTED
GLOBULIN SER CALC-MCNC: 3.6 G/DL (ref 2.3–3.5)
GLUCOSE SERPL-MCNC: 148 MG/DL (ref 70–99)
HCT VFR BLD AUTO: 40.8 % (ref 35.8–46.3)
HGB BLD-MCNC: 13.9 G/DL (ref 11.7–15.4)
IMM GRANULOCYTES # BLD AUTO: 0.03 K/UL (ref 0–0.5)
IMM GRANULOCYTES NFR BLD AUTO: 0.4 % (ref 0–5)
LYMPHOCYTES # BLD: 0.86 K/UL (ref 0.5–4.6)
LYMPHOCYTES NFR BLD: 12 % (ref 13–44)
MAGNESIUM SERPL-MCNC: 2 MG/DL (ref 1.8–2.4)
MCH RBC QN AUTO: 31.5 PG (ref 26.1–32.9)
MCHC RBC AUTO-ENTMCNC: 34.1 G/DL (ref 31.4–35)
MCV RBC AUTO: 92.5 FL (ref 82–102)
MONOCYTES # BLD: 0.9 K/UL (ref 0.1–1.3)
MONOCYTES NFR BLD: 12.6 % (ref 4–12)
NEUTS SEG # BLD: 5.31 K/UL (ref 1.7–8.2)
NEUTS SEG NFR BLD: 74.3 % (ref 43–78)
NRBC # BLD: 0 K/UL (ref 0–0.2)
NT PRO BNP: 163 PG/ML (ref 0–125)
PLATELET # BLD AUTO: 187 K/UL (ref 150–450)
PMV BLD AUTO: 9.3 FL (ref 9.4–12.3)
POTASSIUM SERPL-SCNC: 4 MMOL/L (ref 3.5–5.1)
PROT SERPL-MCNC: 6.8 G/DL (ref 6.3–8.2)
RBC # BLD AUTO: 4.41 M/UL (ref 4.05–5.2)
SARS-COV-2 RDRP RESP QL NAA+PROBE: NOT DETECTED
SODIUM SERPL-SCNC: 136 MMOL/L (ref 136–145)
SOURCE: NORMAL
WBC # BLD AUTO: 7.2 K/UL (ref 4.3–11.1)

## 2025-07-12 PROCEDURE — 85025 COMPLETE CBC W/AUTO DIFF WBC: CPT

## 2025-07-12 PROCEDURE — 6360000002 HC RX W HCPCS: Performed by: STUDENT IN AN ORGANIZED HEALTH CARE EDUCATION/TRAINING PROGRAM

## 2025-07-12 PROCEDURE — 6370000000 HC RX 637 (ALT 250 FOR IP): Performed by: STUDENT IN AN ORGANIZED HEALTH CARE EDUCATION/TRAINING PROGRAM

## 2025-07-12 PROCEDURE — 96374 THER/PROPH/DIAG INJ IV PUSH: CPT

## 2025-07-12 PROCEDURE — 96375 TX/PRO/DX INJ NEW DRUG ADDON: CPT

## 2025-07-12 PROCEDURE — 2580000003 HC RX 258: Performed by: STUDENT IN AN ORGANIZED HEALTH CARE EDUCATION/TRAINING PROGRAM

## 2025-07-12 PROCEDURE — 80053 COMPREHEN METABOLIC PANEL: CPT

## 2025-07-12 PROCEDURE — 93005 ELECTROCARDIOGRAM TRACING: CPT | Performed by: STUDENT IN AN ORGANIZED HEALTH CARE EDUCATION/TRAINING PROGRAM

## 2025-07-12 PROCEDURE — 87636 SARSCOV2 & INF A&B AMP PRB: CPT

## 2025-07-12 PROCEDURE — 71045 X-RAY EXAM CHEST 1 VIEW: CPT

## 2025-07-12 PROCEDURE — 83880 ASSAY OF NATRIURETIC PEPTIDE: CPT

## 2025-07-12 PROCEDURE — 99285 EMERGENCY DEPT VISIT HI MDM: CPT

## 2025-07-12 PROCEDURE — 83735 ASSAY OF MAGNESIUM: CPT

## 2025-07-12 PROCEDURE — 2500000003 HC RX 250 WO HCPCS: Performed by: STUDENT IN AN ORGANIZED HEALTH CARE EDUCATION/TRAINING PROGRAM

## 2025-07-12 PROCEDURE — 94640 AIRWAY INHALATION TREATMENT: CPT

## 2025-07-12 RX ORDER — SODIUM CHLORIDE, SODIUM LACTATE, POTASSIUM CHLORIDE, AND CALCIUM CHLORIDE .6; .31; .03; .02 G/100ML; G/100ML; G/100ML; G/100ML
1000 INJECTION, SOLUTION INTRAVENOUS ONCE
Status: COMPLETED | OUTPATIENT
Start: 2025-07-12 | End: 2025-07-12

## 2025-07-12 RX ORDER — KETOROLAC TROMETHAMINE 10 MG/1
10 TABLET, FILM COATED ORAL EVERY 6 HOURS PRN
Qty: 20 TABLET | Refills: 0 | Status: SHIPPED | OUTPATIENT
Start: 2025-07-12

## 2025-07-12 RX ORDER — KETOROLAC TROMETHAMINE 15 MG/ML
15 INJECTION, SOLUTION INTRAMUSCULAR; INTRAVENOUS
Status: COMPLETED | OUTPATIENT
Start: 2025-07-12 | End: 2025-07-12

## 2025-07-12 RX ORDER — IPRATROPIUM BROMIDE AND ALBUTEROL SULFATE 2.5; .5 MG/3ML; MG/3ML
1 SOLUTION RESPIRATORY (INHALATION) ONCE
Status: COMPLETED | OUTPATIENT
Start: 2025-07-12 | End: 2025-07-12

## 2025-07-12 RX ORDER — BENZONATATE 100 MG/1
100 CAPSULE ORAL 2 TIMES DAILY PRN
Qty: 20 CAPSULE | Refills: 0 | Status: SHIPPED | OUTPATIENT
Start: 2025-07-12 | End: 2025-07-22

## 2025-07-12 RX ORDER — ALBUTEROL SULFATE 90 UG/1
2 INHALANT RESPIRATORY (INHALATION) 4 TIMES DAILY PRN
Qty: 18 G | Refills: 0 | Status: SHIPPED | OUTPATIENT
Start: 2025-07-12

## 2025-07-12 RX ADMIN — IPRATROPIUM BROMIDE AND ALBUTEROL SULFATE 1 DOSE: .5; 3 SOLUTION RESPIRATORY (INHALATION) at 16:52

## 2025-07-12 RX ADMIN — KETOROLAC TROMETHAMINE 15 MG: 15 INJECTION, SOLUTION INTRAMUSCULAR; INTRAVENOUS at 16:55

## 2025-07-12 RX ADMIN — METHYLPREDNISOLONE SODIUM SUCCINATE 60 MG: 125 INJECTION INTRAMUSCULAR; INTRAVENOUS at 16:55

## 2025-07-12 RX ADMIN — SODIUM CHLORIDE, POTASSIUM CHLORIDE, SODIUM LACTATE AND CALCIUM CHLORIDE 1000 ML: 600; 310; 30; 20 INJECTION, SOLUTION INTRAVENOUS at 17:53

## 2025-07-12 ASSESSMENT — PAIN SCALES - GENERAL
PAINLEVEL_OUTOF10: 2
PAINLEVEL_OUTOF10: 2

## 2025-07-12 ASSESSMENT — ENCOUNTER SYMPTOMS
NAUSEA: 0
VOMITING: 0
ABDOMINAL PAIN: 0
SORE THROAT: 0
COUGH: 1
WHEEZING: 0
EYE DISCHARGE: 0
SHORTNESS OF BREATH: 1
EYE PAIN: 0

## 2025-07-12 ASSESSMENT — PAIN - FUNCTIONAL ASSESSMENT: PAIN_FUNCTIONAL_ASSESSMENT: 0-10

## 2025-07-12 NOTE — ED TRIAGE NOTES
Pt presents to ED via [facility] from - pt went to  for SOB/cough since Tuesday, but EMS was called for high heart rate amd low blood pressure. Manual BP at - 80/46. Pt endorses weakness. EMS VSS. Bp in triage 144/109

## 2025-07-12 NOTE — ED PROVIDER NOTES
Emergency Department Provider Note       [facility] EMERGENCY DEPT   PCP: Jaz Sigala MD   Age: 60 y.o.   Sex: female     DISPOSITION Decision To Discharge 07/12/2025 06:58:21 PM    ICD-10-CM    1. Viral URI with cough  J06.9       2. Cardiomegaly  I51.7 [facility] - Miguel Angel Guevara MD, Cardiology, [place]          Medical Decision Making     60-year-old female who presents to the ED for cough and shortness of breath.  Vital signs within normal limits.  No significant tachypnea, no hypoxia noted and no significant tachycardia.  Physical exam largely unremarkable.  EKG shows NSR, no ST elevation or lethal dysrhythmia.  COVID/flu negative.  BNP very mildly elevated 163.  Chest x-ray with no obvious consolidation, large pleural effusion or large pneumothorax.  Mild pulmonary congestion.  Will provide outpatient referral to cardiology for further workup given cardiomegaly noted on chest x-ray.  No history of CHF that she reports.  Does not appear to be fluid overloaded on exam.  Given Toradol, IV fluid, breathing treatment and steroids with significant improvement in symptoms.  Will send home with similar medications.  No significant leukocytosis or anemia.  No chest pain or EKG abnormalities suggest ACS.  Hemodynamically stable and maintaining normal oxygen saturation without supplementation at time of discharge.    Patient was instructed to follow-up with PCP in the next 24 to 48 hours and to follow-up with all specialists given with discharge as soon as possible.  Patient is nontoxic-appearing and has no complaints at time of discharge.  Instructed to return to the emergency department immediately if current symptoms worsen, or any new/concerning symptoms develop for which they voice understanding.  All questions answered at time of discharge.       1 or more acute illnesses that pose a threat to life or bodily function.   Prescription drug management performed.  Patient was discharged risks and benefits of  121/68   Pulse: 96 84 85 77   Resp: 17 17 17 22   Temp:       TempSrc:       SpO2: 92% 93% 93% 92%   Weight:       Height:          Physical Exam  Vitals and nursing note reviewed.   Constitutional:       General: She is not in acute distress.     Appearance: Normal appearance. She is not ill-appearing.   HENT:      Head: Normocephalic and atraumatic.      Nose: No congestion.   Eyes:      Extraocular Movements: Extraocular movements intact.      Pupils: Pupils are equal, round, and reactive to light.   Cardiovascular:      Rate and Rhythm: Normal rate and regular rhythm.   Pulmonary:      Effort: Pulmonary effort is normal.      Breath sounds: Normal breath sounds. No wheezing or rales.   Abdominal:      General: Bowel sounds are normal. There is no distension.      Palpations: Abdomen is soft.      Tenderness: There is no abdominal tenderness.   Musculoskeletal:         General: No deformity. Normal range of motion.      Cervical back: Normal range of motion and neck supple. No tenderness.   Skin:     General: Skin is warm and dry.      Capillary Refill: Capillary refill takes less than 2 seconds.   Neurological:      General: No focal deficit present.      Mental Status: She is alert and oriented to person, place, and time.      Cranial Nerves: No cranial nerve deficit.      Motor: No weakness.        Procedures     Procedures    Orders placed during this emergency department visit:     Orders Placed This Encounter   Procedures    COVID-19 & Influenza Combo    XR CHEST PORTABLE    CBC with Auto Differential    Comprehensive Metabolic Panel    Magnesium    Brain Natriuretic Peptide    [facility] - Miguel Angel Guevara MD, Cardiology, [place]    Cardiac Monitor - ED Only    EKG 12 Lead        Medications given during this emergency department visit:     Medications   ipratropium 0.5 mg-albuterol 2.5 mg (DUONEB) nebulizer solution 1 Dose (1 Dose Inhalation Given 7/12/25 [id number])   methylPREDNISolone sodium succ (SOLU-MEDROL)

## 2025-07-13 LAB
EKG ATRIAL RATE: 91 BPM
EKG DIAGNOSIS: NORMAL
EKG P AXIS: 36 DEGREES
EKG P-R INTERVAL: 141 MS
EKG Q-T INTERVAL: 360 MS
EKG QRS DURATION: 87 MS
EKG QTC CALCULATION (BAZETT): 443 MS
EKG R AXIS: -12 DEGREES
EKG T AXIS: 49 DEGREES
EKG VENTRICULAR RATE: 91 BPM

## 2025-07-13 PROCEDURE — 93010 ELECTROCARDIOGRAM REPORT: CPT | Performed by: INTERNAL MEDICINE

## 2025-08-19 ENCOUNTER — INITIAL CONSULT (OUTPATIENT)
Age: 60
End: 2025-08-19
Payer: MEDICARE

## 2025-08-19 VITALS
BODY MASS INDEX: 36.65 KG/M2 | HEART RATE: 64 BPM | WEIGHT: 220 LBS | HEIGHT: 65 IN | DIASTOLIC BLOOD PRESSURE: 60 MMHG | SYSTOLIC BLOOD PRESSURE: 110 MMHG

## 2025-08-19 DIAGNOSIS — R06.02 SHORTNESS OF BREATH: Primary | ICD-10-CM

## 2025-08-19 DIAGNOSIS — Z13.220 SCREENING FOR HYPERLIPIDEMIA: ICD-10-CM

## 2025-08-19 PROCEDURE — 3078F DIAST BP <80 MM HG: CPT | Performed by: INTERNAL MEDICINE

## 2025-08-19 PROCEDURE — 99204 OFFICE O/P NEW MOD 45 MIN: CPT | Performed by: INTERNAL MEDICINE

## 2025-08-19 PROCEDURE — G8417 CALC BMI ABV UP PARAM F/U: HCPCS | Performed by: INTERNAL MEDICINE

## 2025-08-19 PROCEDURE — 3074F SYST BP LT 130 MM HG: CPT | Performed by: INTERNAL MEDICINE

## 2025-08-19 PROCEDURE — G8428 CUR MEDS NOT DOCUMENT: HCPCS | Performed by: INTERNAL MEDICINE

## 2025-08-19 RX ORDER — TAMOXIFEN CITRATE 20 MG/1
20 TABLET ORAL DAILY
COMMUNITY
Start: 2025-04-16

## 2025-08-19 RX ORDER — LISINOPRIL 2.5 MG/1
2.5 TABLET ORAL DAILY
COMMUNITY
Start: 2025-06-26

## (undated) DEVICE — LOGICUT SCISSOR LENGTH 320MM: Brand: LOGI - LAPAROSCOPIC INSTRUMENT SYSTEM

## (undated) DEVICE — SUTURE SZ 0 27IN 5/8 CIR UR-6  TAPER PT VIOLET ABSRB VICRYL J603H

## (undated) DEVICE — SOLUTION IV 1000ML 0.9% SOD CHL

## (undated) DEVICE — NEEDLE HYPO 21GA L1.5IN INTRAMUSCULAR S STL LATCH BVL UP

## (undated) DEVICE — SPECIMEN RETRIEVAL POUCH: Brand: ENDO CATCH GOLD

## (undated) DEVICE — TRAY PREP DRY W/ PREM GLV 2 APPL 6 SPNG 2 UNDPD 1 OVERWRAP

## (undated) DEVICE — BLADELESS OPTICAL TROCAR WITH FIXATION CANNULA: Brand: VERSAPORT

## (undated) DEVICE — UNIVERSAL FIXATION CANNULA: Brand: VERSAONE

## (undated) DEVICE — CONTAINER SPEC FRMLN 120ML --

## (undated) DEVICE — MASTISOL ADHESIVE LIQ 2/3ML

## (undated) DEVICE — 2, DISPOSABLE SUCTION/IRRIGATOR WITHOUT DISPOSABLE TIP: Brand: STRYKEFLOW

## (undated) DEVICE — CATHETER CHOLGM 4.5FR L18IN W/ MTL SUPP TB

## (undated) DEVICE — VISUALIZATION SYSTEM: Brand: CLEARIFY

## (undated) DEVICE — REM POLYHESIVE ADULT PATIENT RETURN ELECTRODE: Brand: VALLEYLAB

## (undated) DEVICE — LARGE BORE STOPCOCK WITH ROTATING MALE LUER LOCK

## (undated) DEVICE — DRAPE XR C ARM 41X74IN LF --

## (undated) DEVICE — INTENDED FOR TISSUE SEPARATION, AND OTHER PROCEDURES THAT REQUIRE A SHARP SURGICAL BLADE TO PUNCTURE OR CUT.: Brand: BARD-PARKER SAFETY BLADES SIZE 11, STERILE

## (undated) DEVICE — GOWN,REINFORCED,POLY,AURORA,XXLARGE,STR: Brand: MEDLINE

## (undated) DEVICE — SUTURE VCRL SZ 3-0 L27IN ABSRB UD L26MM SH 1/2 CIR J416H

## (undated) DEVICE — (D)STRIP SKN CLSR 0.5X4IN WHT --

## (undated) DEVICE — BLUNT TROCAR WITH THREADED ANCHOR: Brand: VERSAONE

## (undated) DEVICE — 2000CC GUARDIAN II: Brand: GUARDIAN

## (undated) DEVICE — INTRO PERC PER 8FRX3.5IN -- INTRADUCER

## (undated) DEVICE — CLIP APPLIER WITH CLIP LOGIC TECHNOLOGY: Brand: ENDO CLIP III

## (undated) DEVICE — (D)PREP SKN CHLRAPRP APPL 26ML -- CONVERT TO ITEM 371833

## (undated) DEVICE — [HIGH FLOW INSUFFLATOR,  DO NOT USE IF PACKAGE IS DAMAGED,  KEEP DRY,  KEEP AWAY FROM SUNLIGHT,  PROTECT FROM HEAT AND RADIOACTIVE SOURCES.]: Brand: PNEUMOSURE

## (undated) DEVICE — LAP CHOLE: Brand: MEDLINE INDUSTRIES, INC.